# Patient Record
Sex: FEMALE | Race: WHITE | NOT HISPANIC OR LATINO | URBAN - METROPOLITAN AREA
[De-identification: names, ages, dates, MRNs, and addresses within clinical notes are randomized per-mention and may not be internally consistent; named-entity substitution may affect disease eponyms.]

---

## 2021-11-03 ENCOUNTER — EMERGENCY (EMERGENCY)
Facility: HOSPITAL | Age: 21
LOS: 1 days | Discharge: HOME | End: 2021-11-03
Attending: EMERGENCY MEDICINE | Admitting: EMERGENCY MEDICINE
Payer: COMMERCIAL

## 2021-11-03 VITALS
DIASTOLIC BLOOD PRESSURE: 87 MMHG | TEMPERATURE: 98 F | OXYGEN SATURATION: 100 % | SYSTOLIC BLOOD PRESSURE: 136 MMHG | HEART RATE: 108 BPM | RESPIRATION RATE: 16 BRPM

## 2021-11-03 LAB
ALBUMIN SERPL ELPH-MCNC: 4.7 G/DL — SIGNIFICANT CHANGE UP (ref 3.3–5)
ALP SERPL-CCNC: 60 U/L — SIGNIFICANT CHANGE UP (ref 40–120)
ALT FLD-CCNC: 20 U/L — SIGNIFICANT CHANGE UP (ref 4–33)
ANION GAP SERPL CALC-SCNC: 12 MMOL/L — SIGNIFICANT CHANGE UP (ref 7–14)
AST SERPL-CCNC: 22 U/L — SIGNIFICANT CHANGE UP (ref 4–32)
BASE EXCESS BLDV CALC-SCNC: 5.2 MMOL/L — HIGH (ref -2–3)
BASOPHILS # BLD AUTO: 0.09 K/UL — SIGNIFICANT CHANGE UP (ref 0–0.2)
BASOPHILS NFR BLD AUTO: 1.1 % — SIGNIFICANT CHANGE UP (ref 0–2)
BILIRUB SERPL-MCNC: 0.4 MG/DL — SIGNIFICANT CHANGE UP (ref 0.2–1.2)
BLOOD GAS VENOUS COMPREHENSIVE RESULT: SIGNIFICANT CHANGE UP
BUN SERPL-MCNC: 12 MG/DL — SIGNIFICANT CHANGE UP (ref 7–23)
CALCIUM SERPL-MCNC: 9.9 MG/DL — SIGNIFICANT CHANGE UP (ref 8.4–10.5)
CHLORIDE BLDV-SCNC: 102 MMOL/L — SIGNIFICANT CHANGE UP (ref 96–108)
CHLORIDE SERPL-SCNC: 101 MMOL/L — SIGNIFICANT CHANGE UP (ref 98–107)
CO2 BLDV-SCNC: 33.6 MMOL/L — HIGH (ref 22–26)
CO2 SERPL-SCNC: 26 MMOL/L — SIGNIFICANT CHANGE UP (ref 22–31)
CREAT SERPL-MCNC: 0.81 MG/DL — SIGNIFICANT CHANGE UP (ref 0.5–1.3)
D DIMER BLD IA.RAPID-MCNC: 181 NG/ML DDU — SIGNIFICANT CHANGE UP
EOSINOPHIL # BLD AUTO: 0.09 K/UL — SIGNIFICANT CHANGE UP (ref 0–0.5)
EOSINOPHIL NFR BLD AUTO: 1.1 % — SIGNIFICANT CHANGE UP (ref 0–6)
GAS PNL BLDV: 137 MMOL/L — SIGNIFICANT CHANGE UP (ref 136–145)
GLUCOSE BLDV-MCNC: 93 MG/DL — SIGNIFICANT CHANGE UP (ref 70–99)
GLUCOSE SERPL-MCNC: 92 MG/DL — SIGNIFICANT CHANGE UP (ref 70–99)
HCG SERPL-ACNC: <5 MIU/ML — SIGNIFICANT CHANGE UP
HCO3 BLDV-SCNC: 32 MMOL/L — HIGH (ref 22–29)
HCT VFR BLD CALC: 41.8 % — SIGNIFICANT CHANGE UP (ref 34.5–45)
HCT VFR BLDA CALC: 45 % — SIGNIFICANT CHANGE UP (ref 34.5–46.5)
HGB BLD CALC-MCNC: 14.9 G/DL — SIGNIFICANT CHANGE UP (ref 11.5–15.5)
HGB BLD-MCNC: 14.6 G/DL — SIGNIFICANT CHANGE UP (ref 11.5–15.5)
IANC: 4.14 K/UL — SIGNIFICANT CHANGE UP (ref 1.5–8.5)
IMM GRANULOCYTES NFR BLD AUTO: 0.2 % — SIGNIFICANT CHANGE UP (ref 0–1.5)
LACTATE BLDV-MCNC: 1 MMOL/L — SIGNIFICANT CHANGE UP (ref 0.5–2)
LYMPHOCYTES # BLD AUTO: 3.12 K/UL — SIGNIFICANT CHANGE UP (ref 1–3.3)
LYMPHOCYTES # BLD AUTO: 37.2 % — SIGNIFICANT CHANGE UP (ref 13–44)
MAGNESIUM SERPL-MCNC: 2.2 MG/DL — SIGNIFICANT CHANGE UP (ref 1.6–2.6)
MCHC RBC-ENTMCNC: 30.5 PG — SIGNIFICANT CHANGE UP (ref 27–34)
MCHC RBC-ENTMCNC: 34.9 GM/DL — SIGNIFICANT CHANGE UP (ref 32–36)
MCV RBC AUTO: 87.3 FL — SIGNIFICANT CHANGE UP (ref 80–100)
MONOCYTES # BLD AUTO: 0.92 K/UL — HIGH (ref 0–0.9)
MONOCYTES NFR BLD AUTO: 11 % — SIGNIFICANT CHANGE UP (ref 2–14)
NEUTROPHILS # BLD AUTO: 4.14 K/UL — SIGNIFICANT CHANGE UP (ref 1.8–7.4)
NEUTROPHILS NFR BLD AUTO: 49.4 % — SIGNIFICANT CHANGE UP (ref 43–77)
NRBC # BLD: 0 /100 WBCS — SIGNIFICANT CHANGE UP
NRBC # FLD: 0 K/UL — SIGNIFICANT CHANGE UP
NT-PROBNP SERPL-SCNC: 21 PG/ML — SIGNIFICANT CHANGE UP
PCO2 BLDV: 54 MMHG — HIGH (ref 39–42)
PH BLDV: 7.38 — SIGNIFICANT CHANGE UP (ref 7.32–7.43)
PLATELET # BLD AUTO: 214 K/UL — SIGNIFICANT CHANGE UP (ref 150–400)
PO2 BLDV: 24 MMHG — SIGNIFICANT CHANGE UP
POTASSIUM BLDV-SCNC: 4.2 MMOL/L — SIGNIFICANT CHANGE UP (ref 3.5–5.1)
POTASSIUM SERPL-MCNC: 4.4 MMOL/L — SIGNIFICANT CHANGE UP (ref 3.5–5.3)
POTASSIUM SERPL-SCNC: 4.4 MMOL/L — SIGNIFICANT CHANGE UP (ref 3.5–5.3)
PROT SERPL-MCNC: 8.2 G/DL — SIGNIFICANT CHANGE UP (ref 6–8.3)
RBC # BLD: 4.79 M/UL — SIGNIFICANT CHANGE UP (ref 3.8–5.2)
RBC # FLD: 11.9 % — SIGNIFICANT CHANGE UP (ref 10.3–14.5)
SAO2 % BLDV: 35.1 % — SIGNIFICANT CHANGE UP
SODIUM SERPL-SCNC: 139 MMOL/L — SIGNIFICANT CHANGE UP (ref 135–145)
TROPONIN T, HIGH SENSITIVITY RESULT: <6 NG/L — SIGNIFICANT CHANGE UP
WBC # BLD: 8.38 K/UL — SIGNIFICANT CHANGE UP (ref 3.8–10.5)
WBC # FLD AUTO: 8.38 K/UL — SIGNIFICANT CHANGE UP (ref 3.8–10.5)

## 2021-11-03 PROCEDURE — 99285 EMERGENCY DEPT VISIT HI MDM: CPT | Mod: 25

## 2021-11-03 PROCEDURE — 93010 ELECTROCARDIOGRAM REPORT: CPT

## 2021-11-03 PROCEDURE — 71046 X-RAY EXAM CHEST 2 VIEWS: CPT | Mod: 26

## 2021-11-03 RX ORDER — SODIUM CHLORIDE 9 MG/ML
1000 INJECTION INTRAMUSCULAR; INTRAVENOUS; SUBCUTANEOUS ONCE
Refills: 0 | Status: COMPLETED | OUTPATIENT
Start: 2021-11-03 | End: 2021-11-03

## 2021-11-03 RX ORDER — ASPIRIN/CALCIUM CARB/MAGNESIUM 324 MG
162 TABLET ORAL ONCE
Refills: 0 | Status: COMPLETED | OUTPATIENT
Start: 2021-11-03 | End: 2021-11-03

## 2021-11-03 RX ADMIN — SODIUM CHLORIDE 2000 MILLILITER(S): 9 INJECTION INTRAMUSCULAR; INTRAVENOUS; SUBCUTANEOUS at 23:43

## 2021-11-03 RX ADMIN — Medication 162 MILLIGRAM(S): at 22:11

## 2021-11-03 RX ADMIN — SODIUM CHLORIDE 1000 MILLILITER(S): 9 INJECTION INTRAMUSCULAR; INTRAVENOUS; SUBCUTANEOUS at 22:11

## 2021-11-03 RX ADMIN — SODIUM CHLORIDE 1000 MILLILITER(S): 9 INJECTION INTRAMUSCULAR; INTRAVENOUS; SUBCUTANEOUS at 23:44

## 2021-11-03 NOTE — ED PROVIDER NOTE - PATIENT PORTAL LINK FT
You can access the FollowMyHealth Patient Portal offered by [facility] by registering at the following website: [email or web address]. By joining [facility]’s FollowMyHealth portal, you will also be able to view your health information using other applications (apps) compatible with our system.

## 2021-11-03 NOTE — ED ADULT TRIAGE NOTE - CHIEF COMPLAINT QUOTE
alert oriented c/o mid chest/ epigastric pain  palpitations and SOB started 3 days ago became worse today  hx anxiety depression high cholesterol IBS   SaO2 100% in triage

## 2021-11-03 NOTE — ED ADULT NURSE NOTE - OBJECTIVE STATEMENT
19 yo F AAOx4 received to room 16, c/o SOB and palpitations for the past 3 days, worse today, hx anxiety/depression but states it doesn't feel like "one of those panic attack episodes," no distress on exam, resps even and unlabored on RA, saturation 100%, #20 placed to LAC, pending CXR results, report given to primary RN Devorah

## 2021-11-03 NOTE — ED PROVIDER NOTE - NSFOLLOWUPINSTRUCTIONS_ED_ALL_ED_FT
Tachycardia     AMBULATORY CARE:    Tachycardia (fast heart rate) Tachycardia (fast heart rate) is when your heart rate is 100 beats per minute or more at rest. It is normal for the heart rate to increase with activity or exercise and then decrease when you stop. A fast heart rate at rest may be caused by strong emotions, fever, activity, some medicines, drugs, or caffeine.    Heart Chambers         Other symptoms that may occur with a fast heartbeat: You may have no other symptoms with your fast heart rate, or you may have any of the following:  •Dizziness or lightheadedness      •Chest pain      •Fluttering or pounding heart       •Shortness of breath      Call your local emergency number (911 in the [place]) or have someone call if:   •You have any of the following signs of a heart attack: ?Squeezing, pressure, or pain in your chest      ?You may also have any of the following: ?Discomfort or pain in your back, neck, jaw, stomach, or arm      ?Shortness of breath      ?Nausea or vomiting      ?Lightheadedness or a sudden cold sweat        •You cannot be woken.      Call your doctor or cardiologist if:   •Your pulse is faster than you were told it should be.      •You have a fast heart rate often.      •You feel weak or dizzy.      •You have questions or concerns about your condition or care.      Treatment may be needed if your fast heart rate continues or happens often. You may need medicine, procedures, or surgery.    Check your heart rate (pulse) as directed: Your healthcare provider will show you how to check your pulse, and how often to check it. Write down how fast your pulse is and if it feels regular or like it is skipping beats. Also write down the activity you were doing if your heart rate is above 100. Bring the information with you to your follow-up appointment.    How to Take a Pulse         Help prevent a fast heart rate:   •Have less caffeine. Caffeine can increase your heart rate.      •Limit or do not drink alcohol. Alcohol can increase your heart rate. Ask your healthcare provider if it is okay for you to drink any alcohol. He or she can help you set limits for the number of drinks you have in 24 hours and in a week. A drink of alcohol is 12 ounces of beer, 5 ounces of wine, or 1½ ounces of liquor.      •Do not smoke. Nicotine and other chemicals in cigarettes can cause damage to your heart. Ask your healthcare provider for information if you currently smoke and need help to quit. E-cigarettes or smokeless tobacco still contain nicotine. Talk to your healthcare provider before you use these products.       •Do not use illegal drugs. Drugs such as meth and cocaine can increase your heart rate. Talk to your healthcare provider if you use illegal drugs and want to quit.      •Get more rest. Fatigue can cause your heart rate to increase. Ask your healthcare provider about the best exercise plan for you.      •Eat heart-healthy foods. These include fruits, vegetables, whole-grain breads, low-fat dairy products, beans, lean meats, and fish. Replace butter and margarine with heart-healthy oils such as olive oil and canola oil.             •Exercise regularly and maintain a healthy weight. Ask about the best exercise plan for you. Ask your healthcare provider what a healthy weight is for you. Ask him or her to help you create a safe weight loss plan if you are overweight.  [employer] Walking for Exercise           •Learn ways to cope with stress. Stress, fear, and anxiety can cause a fast heart rate. Your healthcare provider may recommend relaxation techniques and deep breathing exercises. Your healthcare provider may recommend you talk to someone about your stress or anxiety, such as a counselor or a trusted friend.      •Talk to your healthcare provider about all your current medicines. He or she may change a medicine if it is causing your fast heart rate. Do not stop taking any medicine unless directed by your provider.      Follow up with your doctor or cardiologist as directed: You may need more tests. Write down your questions so you remember to ask them at your visit.

## 2021-11-03 NOTE — ED PROVIDER NOTE - NS ED ROS FT
Constitutional: No fever, chills.  Eyes:  No visual changes  ENMT:  No neck pain  Cardiac:  +chest pain  Respiratory:  No cough, +SOB  GI:  No nausea, vomiting, diarrhea, abdominal pain.  :  No dysuria, hematuria  MS:  No back pain.  Neuro:  No headache or lightheadedness  Skin:  No skin rash  Except as documented in the HPI,  all other systems are negative.

## 2021-11-03 NOTE — ED PROVIDER NOTE - PHYSICAL EXAMINATION
Vital signs reviewed  GENERAL: Patient nontoxic appearing, NAD  HEAD: NCAT  EYES: Anicteric  ENT: MMM  NECK: Supple, non tender  RESPIRATORY: Normal respiratory effort. CTA B/L. No wheezing, rales, rhonchi  CARDIOVASCULAR: Regular rate and rhythm  ABDOMEN: Soft. Nondistended. Nontender. No guarding or rebound. No CVA tenderness.  MUSCULOSKELETAL/EXTREMITIES: Brisk cap refill. 2+ radial pulses. No leg edema.  SKIN:  Warm and dry  NEURO: AAOx3. No gross FND.  PSYCHIATRIC: Cooperative. Affect appropriate. Vital signs reviewed  GENERAL: Patient nontoxic appearing, NAD  HEAD: NCAT  EYES: Anicteric  ENT: MMM  NECK: Supple, non tender  RESPIRATORY: Normal respiratory effort. CTA B/L. No wheezing, rales, rhonchi  CARDIOVASCULAR: Regular rate and rhythm  ABDOMEN: Soft. Nondistended. Nontender. No guarding or rebound. No CVA tenderness.  MUSCULOSKELETAL/EXTREMITIES: Brisk cap refill. 2+ radial pulses. No leg edema.  SKIN:  Warm and dry  NEURO: AAOx3. No gross FND.  PSYCHIATRIC: Cooperative. Affect appropriate.  ATTENDING PHYSICAL EXAM  GEN - NAD; well appearing; A+O x3  HEAD - NC/AT; EYES/NOSE - PERRL, EOMI, mucous membranes moist, no discharge; THROAT: Oral cavity and pharynx normal. No inflammation, swelling, exudate, or lesions  NECK: Neck supple, non-tender without lymphadenopathy, no masses, no JVD  PULMONARY - CTA b/l, symmetric breath sounds, no w/r/r  CARDIAC -s1s2, mild tachy, no M,R,G  ABDOMEN - +NABS, ND, NT, soft, no guarding, no rebound, no masses   BACK - no CVA tenderness, No vertebral or paravertebral tenderness  EXTREMITIES - symmetric pulses, 2+ dp, capillary refill < 2 seconds, no clubbing, no cyanosis, no edema  NEUROLOGIC - alert, CN 2-12 intact  PSYCH - insight and judgment nl, memory nl, affect nl, thought nl

## 2021-11-03 NOTE — ED PROVIDER NOTE - CLINICAL SUMMARY MEDICAL DECISION MAKING FREE TEXT BOX
19 y/o F p/w palpitations, tachycardic, cp, satting well on ra. eval for pe, acs, pericarditis, myocarditis  pneumonia. low suspicion for dissection, given low risk factors and stable vitals. pending labs and imaging

## 2021-11-03 NOTE — ED PROVIDER NOTE - OBJECTIVE STATEMENT
19 y/o F hx of anxiety, depression, HLD p/w tachycardia x earlier today. pt states she has been tracking her HR on her watch, states her normal resting HR is 70s, lately it has been 120s and with ambulating 130s. she feels sob with excretion. never had similar sxs before. +substernal dull cp, intermittent,   pt denies any fever, chills, abdominal pain, v/d, dysuria, numbness   on OCP  no family hx of cardiac dz, DVT/PE. 21 y/o F hx of anxiety, depression, HLD p/w tachycardia x earlier today. pt states she has been tracking her HR on her watch, states her normal resting HR is 70s, lately it has been 120s and with ambulating 130s. she feels sob with excretion. never had similar sxs before. +substernal dull cp, intermittent,   pt denies any fever, chills, abdominal pain, v/d, dysuria, numbness   on OCP  no family hx of cardiac dz, DVT/PE.  Attending - Agree with above.  I evaluated patient myself. 21 y/o F c/o increasing SEAMAN and SOB at rest x few days 19 y/o F hx of anxiety, depression, HLD p/w tachycardia x earlier today. pt states she has been tracking her HR on her watch, states her normal resting HR is 70s, lately it has been 120s and with ambulating 130s. she feels sob with excretion. never had similar sxs before. +substernal dull cp, intermittent,   pt denies any fever, chills, abdominal pain, v/d, dysuria, numbness   on OCP  no family hx of cardiac dz, DVT/PE.  Attending - Agree with above.  I evaluated patient myself. 19 y/o F c/o increasing SEAMAN and SOB at rest x few days. Today noticed 'pressure' in anterior chest described as heaviness.  States having symptoms even when not feeling anxious.  Never had this before.  No recent trauma.  No fever, cough, current covid.  On OCP Mindy.  LMP 3 weeks ago.

## 2021-11-04 VITALS
HEART RATE: 89 BPM | DIASTOLIC BLOOD PRESSURE: 67 MMHG | RESPIRATION RATE: 19 BRPM | SYSTOLIC BLOOD PRESSURE: 113 MMHG | OXYGEN SATURATION: 100 %

## 2021-11-04 LAB — SARS-COV-2 RNA SPEC QL NAA+PROBE: SIGNIFICANT CHANGE UP

## 2021-11-04 PROCEDURE — 93308 TTE F-UP OR LMTD: CPT | Mod: 26

## 2021-11-04 NOTE — ED ADULT NURSE REASSESSMENT NOTE - NS ED NURSE REASSESS COMMENT FT1
Pt reports resolve of symptoms at this time. Connected to tele SR @80's, saturating well on room air. Pending discharge

## 2022-02-28 ENCOUNTER — INPATIENT (INPATIENT)
Facility: HOSPITAL | Age: 22
LOS: 8 days | Discharge: HOME | End: 2022-03-09
Attending: STUDENT IN AN ORGANIZED HEALTH CARE EDUCATION/TRAINING PROGRAM | Admitting: PSYCHIATRY & NEUROLOGY
Payer: COMMERCIAL

## 2022-02-28 ENCOUNTER — OUTPATIENT (OUTPATIENT)
Dept: OUTPATIENT SERVICES | Facility: HOSPITAL | Age: 22
LOS: 1 days | Discharge: HOME | End: 2022-02-28

## 2022-02-28 VITALS
TEMPERATURE: 99 F | SYSTOLIC BLOOD PRESSURE: 126 MMHG | HEART RATE: 84 BPM | OXYGEN SATURATION: 99 % | RESPIRATION RATE: 18 BRPM | DIASTOLIC BLOOD PRESSURE: 87 MMHG

## 2022-02-28 DIAGNOSIS — F41.9 ANXIETY DISORDER, UNSPECIFIED: ICD-10-CM

## 2022-02-28 DIAGNOSIS — F33.9 MAJOR DEPRESSIVE DISORDER, RECURRENT, UNSPECIFIED: ICD-10-CM

## 2022-02-28 DIAGNOSIS — F32.2 MAJOR DEPRESSIVE DISORDER, SINGLE EPISODE, SEVERE WITHOUT PSYCHOTIC FEATURES: ICD-10-CM

## 2022-02-28 PROBLEM — E78.5 HYPERLIPIDEMIA, UNSPECIFIED: Chronic | Status: ACTIVE | Noted: 2021-11-03

## 2022-02-28 LAB
ALBUMIN SERPL ELPH-MCNC: 4.5 G/DL — SIGNIFICANT CHANGE UP (ref 3.3–5)
ALP SERPL-CCNC: 51 U/L — SIGNIFICANT CHANGE UP (ref 40–120)
ALT FLD-CCNC: 23 U/L — SIGNIFICANT CHANGE UP (ref 4–33)
ANION GAP SERPL CALC-SCNC: 13 MMOL/L — SIGNIFICANT CHANGE UP (ref 7–14)
APPEARANCE UR: CLEAR — SIGNIFICANT CHANGE UP
AST SERPL-CCNC: 18 U/L — SIGNIFICANT CHANGE UP (ref 4–32)
BASOPHILS # BLD AUTO: 0.07 K/UL — SIGNIFICANT CHANGE UP (ref 0–0.2)
BASOPHILS NFR BLD AUTO: 0.9 % — SIGNIFICANT CHANGE UP (ref 0–2)
BILIRUB SERPL-MCNC: 0.6 MG/DL — SIGNIFICANT CHANGE UP (ref 0.2–1.2)
BILIRUB UR-MCNC: NEGATIVE — SIGNIFICANT CHANGE UP
BUN SERPL-MCNC: 7 MG/DL — SIGNIFICANT CHANGE UP (ref 7–23)
CALCIUM SERPL-MCNC: 9.4 MG/DL — SIGNIFICANT CHANGE UP (ref 8.4–10.5)
CHLORIDE SERPL-SCNC: 102 MMOL/L — SIGNIFICANT CHANGE UP (ref 98–107)
CO2 SERPL-SCNC: 24 MMOL/L — SIGNIFICANT CHANGE UP (ref 22–31)
COLOR SPEC: SIGNIFICANT CHANGE UP
CREAT SERPL-MCNC: 0.6 MG/DL — SIGNIFICANT CHANGE UP (ref 0.5–1.3)
DIFF PNL FLD: ABNORMAL
EGFR: 130 ML/MIN/1.73M2 — SIGNIFICANT CHANGE UP
EOSINOPHIL # BLD AUTO: 0.03 K/UL — SIGNIFICANT CHANGE UP (ref 0–0.5)
EOSINOPHIL NFR BLD AUTO: 0.4 % — SIGNIFICANT CHANGE UP (ref 0–6)
FLUAV AG NPH QL: SIGNIFICANT CHANGE UP
FLUBV AG NPH QL: SIGNIFICANT CHANGE UP
GLUCOSE SERPL-MCNC: 90 MG/DL — SIGNIFICANT CHANGE UP (ref 70–99)
GLUCOSE UR QL: NEGATIVE — SIGNIFICANT CHANGE UP
HCG SERPL-ACNC: <5 MIU/ML — SIGNIFICANT CHANGE UP
HCT VFR BLD CALC: 40.3 % — SIGNIFICANT CHANGE UP (ref 34.5–45)
HGB BLD-MCNC: 14.5 G/DL — SIGNIFICANT CHANGE UP (ref 11.5–15.5)
IANC: 4.56 K/UL — SIGNIFICANT CHANGE UP (ref 1.5–8.5)
IMM GRANULOCYTES NFR BLD AUTO: 0.5 % — SIGNIFICANT CHANGE UP (ref 0–1.5)
KETONES UR-MCNC: NEGATIVE — SIGNIFICANT CHANGE UP
LEUKOCYTE ESTERASE UR-ACNC: NEGATIVE — SIGNIFICANT CHANGE UP
LYMPHOCYTES # BLD AUTO: 2.58 K/UL — SIGNIFICANT CHANGE UP (ref 1–3.3)
LYMPHOCYTES # BLD AUTO: 31.5 % — SIGNIFICANT CHANGE UP (ref 13–44)
MCHC RBC-ENTMCNC: 30.3 PG — SIGNIFICANT CHANGE UP (ref 27–34)
MCHC RBC-ENTMCNC: 36 GM/DL — SIGNIFICANT CHANGE UP (ref 32–36)
MCV RBC AUTO: 84.1 FL — SIGNIFICANT CHANGE UP (ref 80–100)
MONOCYTES # BLD AUTO: 0.9 K/UL — SIGNIFICANT CHANGE UP (ref 0–0.9)
MONOCYTES NFR BLD AUTO: 11 % — SIGNIFICANT CHANGE UP (ref 2–14)
NEUTROPHILS # BLD AUTO: 4.56 K/UL — SIGNIFICANT CHANGE UP (ref 1.8–7.4)
NEUTROPHILS NFR BLD AUTO: 55.7 % — SIGNIFICANT CHANGE UP (ref 43–77)
NITRITE UR-MCNC: NEGATIVE — SIGNIFICANT CHANGE UP
NRBC # BLD: 0 /100 WBCS — SIGNIFICANT CHANGE UP
NRBC # FLD: 0 K/UL — SIGNIFICANT CHANGE UP
PCP SPEC-MCNC: SIGNIFICANT CHANGE UP
PH UR: 7 — SIGNIFICANT CHANGE UP (ref 5–8)
PLATELET # BLD AUTO: 231 K/UL — SIGNIFICANT CHANGE UP (ref 150–400)
POTASSIUM SERPL-MCNC: 3.7 MMOL/L — SIGNIFICANT CHANGE UP (ref 3.5–5.3)
POTASSIUM SERPL-SCNC: 3.7 MMOL/L — SIGNIFICANT CHANGE UP (ref 3.5–5.3)
PROT SERPL-MCNC: 7.5 G/DL — SIGNIFICANT CHANGE UP (ref 6–8.3)
PROT UR-MCNC: NEGATIVE — SIGNIFICANT CHANGE UP
RBC # BLD: 4.79 M/UL — SIGNIFICANT CHANGE UP (ref 3.8–5.2)
RBC # FLD: 11.9 % — SIGNIFICANT CHANGE UP (ref 10.3–14.5)
RSV RNA NPH QL NAA+NON-PROBE: SIGNIFICANT CHANGE UP
SARS-COV-2 RNA SPEC QL NAA+PROBE: SIGNIFICANT CHANGE UP
SODIUM SERPL-SCNC: 139 MMOL/L — SIGNIFICANT CHANGE UP (ref 135–145)
SP GR SPEC: 1.01 — SIGNIFICANT CHANGE UP (ref 1–1.05)
TOXICOLOGY SCREEN, DRUGS OF ABUSE, SERUM RESULT: SIGNIFICANT CHANGE UP
TSH SERPL-MCNC: 1.05 UIU/ML — SIGNIFICANT CHANGE UP (ref 0.27–4.2)
UROBILINOGEN FLD QL: SIGNIFICANT CHANGE UP
WBC # BLD: 8.18 K/UL — SIGNIFICANT CHANGE UP (ref 3.8–10.5)
WBC # FLD AUTO: 8.18 K/UL — SIGNIFICANT CHANGE UP (ref 3.8–10.5)

## 2022-02-28 PROCEDURE — 90839 PSYTX CRISIS INITIAL 60 MIN: CPT | Mod: 95

## 2022-02-28 PROCEDURE — 99285 EMERGENCY DEPT VISIT HI MDM: CPT | Mod: 25

## 2022-02-28 PROCEDURE — 93010 ELECTROCARDIOGRAM REPORT: CPT

## 2022-02-28 PROCEDURE — 99285 EMERGENCY DEPT VISIT HI MDM: CPT | Mod: GC

## 2022-02-28 RX ORDER — TRAZODONE HCL 50 MG
25 TABLET ORAL AT BEDTIME
Refills: 0 | Status: DISCONTINUED | OUTPATIENT
Start: 2022-02-28 | End: 2022-03-05

## 2022-02-28 RX ORDER — ACETAMINOPHEN 500 MG
650 TABLET ORAL ONCE
Refills: 0 | Status: COMPLETED | OUTPATIENT
Start: 2022-02-28 | End: 2022-02-28

## 2022-02-28 RX ORDER — ATORVASTATIN CALCIUM 80 MG/1
10 TABLET, FILM COATED ORAL AT BEDTIME
Refills: 0 | Status: DISCONTINUED | OUTPATIENT
Start: 2022-02-28 | End: 2022-03-09

## 2022-02-28 RX ORDER — CLONAZEPAM 1 MG
0.5 TABLET ORAL DAILY
Refills: 0 | Status: DISCONTINUED | OUTPATIENT
Start: 2022-02-28 | End: 2022-03-02

## 2022-02-28 RX ORDER — INFLUENZA VIRUS VACCINE 15; 15; 15; 15 UG/.5ML; UG/.5ML; UG/.5ML; UG/.5ML
0.5 SUSPENSION INTRAMUSCULAR ONCE
Refills: 0 | Status: COMPLETED | OUTPATIENT
Start: 2022-02-28 | End: 2022-02-28

## 2022-02-28 RX ORDER — ACETAMINOPHEN 500 MG
650 TABLET ORAL EVERY 6 HOURS
Refills: 0 | Status: DISCONTINUED | OUTPATIENT
Start: 2022-02-28 | End: 2022-03-09

## 2022-02-28 RX ORDER — SERTRALINE 25 MG/1
150 TABLET, FILM COATED ORAL DAILY
Refills: 0 | Status: DISCONTINUED | OUTPATIENT
Start: 2022-03-01 | End: 2022-03-02

## 2022-02-28 RX ADMIN — Medication 650 MILLIGRAM(S): at 20:56

## 2022-02-28 RX ADMIN — Medication 25 MILLIGRAM(S): at 22:28

## 2022-02-28 RX ADMIN — Medication 1 MILLIGRAM(S): at 19:06

## 2022-02-28 RX ADMIN — ATORVASTATIN CALCIUM 10 MILLIGRAM(S): 80 TABLET, FILM COATED ORAL at 22:28

## 2022-02-28 RX ADMIN — Medication 650 MILLIGRAM(S): at 19:11

## 2022-02-28 NOTE — ED PROVIDER NOTE - OBJECTIVE STATEMENT
21 year old female history of anxiety and depression presents for worsening anxiety, depression and SI. Patient states that she had a "stomach bug" 4-5 days ago consisting of acid reflux and diarrhea, patient states she had a negative covid test, symptoms resolved but since has been extremely anxious and depressed.  Patient states that due to anxiety she has been feeling suicidal with a loose plan of crashing car and is afraid to drive due to this plan.  Pt reports crying, no appetite, not sleeping, over-thinking/excessive worry, feeling on edge, worthlessness/guilt/burden to family, isolating, low motivation and anhedonia.  Patient also endorses having panic attacks where she feels chest pain / palpitations and shortness of breath, but sx resolve when panic attack subsides. Patient denies HI/AH/VH.  Patient denies illicit drugs or ETOH, no other or current physical complaints or concerns.   Patient went to [facility], requested voluntary admission but sent to the ER due to lack of inpatient bed availability.

## 2022-02-28 NOTE — ED ADULT NURSE NOTE - OBJECTIVE STATEMENT
Pt received into . AAOx4, appears tearful and anxious. Pt being sent in from crisis center for worsening anxiety over the past 4 days. c/o having SI. Denies drug/alcohol use. Pt states she is compliant with her medications. Psych at bedside for evaluation. Safety maintained, needs are met. Labs drawn and sent. no acute distress. Awaiting further plan of care.

## 2022-02-28 NOTE — ED BEHAVIORAL HEALTH ASSESSMENT NOTE - SUMMARY
Chelo is a 21-year-old female, single, non-caregiver residing with her parents, currently attending [employer], with PPhx anxiety, panic and depression with long hx of outpatient treatment (currently in   treatment with Dr. Morataya for the past 3 years - is rx'd Sertraline 150mg, Trazadone 25mg and Klonopin 0.5mg), no history of violence, no legal involvement, no self-injurious behaviors, no suicide attempts, no psychiatric hospitalizations and no access to guns/weapons, who presents to [facility] ED from the [facility] crisis center where she presented seeking voluntary admission for SI, depression and anxiety.     Patient presents as depressed with acutely worsening anxiety leading to SI of increasing intensity, now to the point where she does not feel safe being on her own. Her anxiety has increased to the point of having panic attacks and poor appetite with recent weight loss. She is seeking and warrants voluntary psychiatric admission for safety, stabilization and treatment optimization. She denies suicidal intent and plan in the hospital.     Plan:  - admit to [facility] on 9.13   - q15 checks  - PRN's: Ativan 1 mg q4H PO PRN for anxiety/agitation. Ativan 2 mg IM for severe agitation. Melatonin 3 mg qHS PRN for insomnia.   - Standing psych meds - for now, will continue home meds.   --> Klonopin 0.5 mg daily (started with outpatient psychiatrist today)  --> Sertraline 150 mg daily (consider increasing in AM)   --> Trazodone 25 mg qHS for insomnia   Medical:  - no acute medical issues  - takes Atorvastatin 10 mg qHS  - takes Mindy birth control, f/u re: bringing home med   - f/u TSH, CMP, CMC, lipids, A1C

## 2022-02-28 NOTE — ED ADULT TRIAGE NOTE - CHIEF COMPLAINT QUOTE
Pt being sent in from crisis center for worsening anxiety and SI. Pt states she is compliant with her medications. Denies alcohol/drug use. Pt cooperative.

## 2022-02-28 NOTE — ED BEHAVIORAL HEALTH ASSESSMENT NOTE - CASE SUMMARY
21F with depression and anxiety presents seeking admission for worsening symptoms. Patient reports unbearable anxiety without relief, panic symptoms, worsening depression with suicidal thoughts and vague plans to drown or get in a car accident. Symptoms persist despite medication adherence and outpatient follow up. She feels unsafe and is requesting, and appropriate for, voluntary psychiatric admission. Admit to 2N, voluntary, does not require 1:1 in locked supervised setting. I agree with note/plan above.

## 2022-02-28 NOTE — ED BEHAVIORAL HEALTH ASSESSMENT NOTE - RISK ASSESSMENT
Moderate Acute Suicide Risk RF: Depressed, acutely worsening anxiety, SI with ideas of methods, does not feel safe to be by herself due to SI, panic attacks, h/o anxiety and depression.   PF: help seeking, future oriented, connected to outpatient care, supportive family  Given the above, patient is at a moderate acute risk of suicide, and would benefit from voluntary inpatient admission.

## 2022-02-28 NOTE — ED PROVIDER NOTE - CLINICAL SUMMARY MEDICAL DECISION MAKING FREE TEXT BOX
21 year old female history of anxiety and depression presents for worsening anxiety, depression and SI.  Also endorsing panic attacks with associated sob / chest pain / palpitations which subside when panic attack resolves.  Low suspicion for PE/ACS/PTX or other concerning medical diagnosis.  Medical evaluation performed. There is no clinical evidence of intoxication or any acute medical problem requiring immediate intervention. Patient is awaiting psychiatric consultation. Final disposition will be determined by psychiatrist.

## 2022-02-28 NOTE — ED BEHAVIORAL HEALTH NOTE - BEHAVIORAL HEALTH NOTE
COVID Exposure Screen- Patient  1.	*Have you had a COVID-19 test in the last 90 days?  (  ) Yes   (  ) No   (  ) Unknown- Reason: _____  IF YES PROCEED TO QUESTION #2. IF NO OR UNKNOWN, PLEASE SKIP TO QUESTION #3.  2.	Date of test(s) and result(s): ________  3.	*Have you tested positive for COVID-19 antibodies? (  ) Yes   (  ) No   (  ) Unknown- Reason: _____  IF YES PROCEED TO QUESTION #4. IF NO or UNKNOWN, PLEASE SKIP TO QUESTION #5.  4.	Date of positive antibody test: ________  5.	*Have you received 2 doses of the COVID-19 vaccine? (  ) Yes   (  ) No   (  ) Unknown- Reason: _____   IF YES PROCEED TO QUESTION #6. IF NO or UNKNOWN, PLEASE SKIP TO QUESTION #7.  6.	Date of second dose: ________  7.	*In the past 10 days, have you been around anyone with a positive COVID-19 test?* (  ) Yes   (  ) No   (  ) Unknown- Reason: ____  IF YES PROCEED TO QUESTION #8. IF NO or UNKNOWN, PLEASE SKIP TO QUESTION #13.  8.	Were you within 6 feet of them for at least 15 minutes? (  ) Yes   (  ) No   (  ) Unknown- Reason: _____  9.	Have you provided care for them? (  ) Yes   (  ) No   (  ) Unknown- Reason: ______  10.	Have you had direct physical contact with them (touched, hugged, or kissed them)? (  ) Yes   (  ) No    (  ) Unknown- Reason: _____  11.	Have you shared eating or drinking utensils with them? (  ) Yes   (  ) No    (  ) Unknown- Reason: ____  12.	Have they sneezed, coughed, or somehow gotten respiratory droplets on you? (  ) Yes   (  ) No    (  ) Unknown- Reason: ______  13.	*Have you been out of [place] within the past 10 days?* (  ) Yes   (  ) No   (  ) Unknown- Reason: _____  IF YES PLEASE ANSWER THE FOLLOWING QUESTIONS:  14.	Which state/country have you been to? ______  15.	Were you there over 24 hours? (  ) Yes   (  ) No    (  ) Unknown- Reason: ______  16.	Date of return to [facility]: ______ COVID Exposure Screen- Patient  1.	*Have you had a COVID-19 test in the last 90 days?  (x  ) Yes   (  ) No   (  ) Unknown- Reason: stomach upset  IF YES PROCEED TO QUESTION #2. IF NO OR UNKNOWN, PLEASE SKIP TO QUESTION #3.  2.	Date of test(s) and result(s): tues 2/22/22 negative  3.	*Have you tested positive for COVID-19 antibodies? (  ) Yes   (  ) No   ( x ) Unknown- Reason: not tested  IF YES PROCEED TO QUESTION #4. IF NO or UNKNOWN, PLEASE SKIP TO QUESTION #5.  4.	Date of positive antibody test: ________  5.	*Have you received 2 doses of the COVID-19 vaccine? ( x ) Yes   (  ) No   (  ) Unknown- Reason: _____   IF YES PROCEED TO QUESTION #6. IF NO or UNKNOWN, PLEASE SKIP TO QUESTION #7.  6.	Date of second dose: Feb 12 2021  7.	*In the past 10 days, have you been around anyone with a positive COVID-19 test?* (  ) Yes   ( x ) No   (  ) Unknown- Reason: ____  IF YES PROCEED TO QUESTION #8. IF NO or UNKNOWN, PLEASE SKIP TO QUESTION #13.  8.	Were you within 6 feet of them for at least 15 minutes? (  ) Yes   (  ) No   (  ) Unknown- Reason: _____  9.	Have you provided care for them? (  ) Yes   (  ) No   (  ) Unknown- Reason: ______  10.	Have you had direct physical contact with them (touched, hugged, or kissed them)? (  ) Yes   (  ) No    (  ) Unknown- Reason: _____  11.	Have you shared eating or drinking utensils with them? (  ) Yes   (  ) No    (  ) Unknown- Reason: ____  12.	Have they sneezed, coughed, or somehow gotten respiratory droplets on you? (  ) Yes   (  ) No    (  ) Unknown- Reason: ______  13.	*Have you been out of [place] within the past 10 days?* (  ) Yes   (  x) No   (  ) Unknown- Reason: _____  IF YES PLEASE ANSWER THE FOLLOWING QUESTIONS:  14.	Which state/country have you been to? ______  15.	Were you there over 24 hours? (  ) Yes   (  ) No    (  ) Unknown- Reason: ______  16.	Date of return to [facility]: ______

## 2022-02-28 NOTE — ED BEHAVIORAL HEALTH NOTE - BEHAVIORAL HEALTH NOTE
COLLATERAL OBTAINED FROM NITO ROACH, [facility]:   who reported that the Pt was seen today at the clinic due to worsening symptoms of depression, anxiety and SI (multiple plans: crashing car, standing in front of moving traffic, drowning). Pt verbalized being afraid of driving her car lately. she has not been attending [employer] lately (wherein, she is a BioTech major) due to ongoing symptoms. depressive symptoms described as experiencing sad mood + anhedonia; associated sleep disturbances; feeling guilty and feels like she is a "burden to her family".  anxiety symptoms described as experiencing chest tightness with SOB and nausea. Pt is being seen by a community psychiatrist and prescribed with: Zoloft 150mg daily, Trazodone 25mg HS and Klonopin 0.5mg daily.      identified ongoing stressors: academic related, relational familial conflicts - ? particularly mother; ? recent "stomach infection"    Pt has no reported hx of psych admissions, no past or recent hx of suicide attempts nor engaged in self injurious behaviors. no reported illicit substance use.    Medical hx: HLD - on Lipitor 10mg daily COLLATERAL OBTAINED FROM NITO ROACH, [facility]:   who reported that the Pt was seen today at the clinic due to worsening symptoms of depression, anxiety and SI (multiple plans: crashing car, standing in front of moving traffic, drowning). Pt verbalized being afraid of driving her car lately. she has not been attending [employer] lately (wherein, she is a BioTech major) due to ongoing symptoms. depressive symptoms described as experiencing sad mood + anhedonia; associated sleep disturbances; feeling guilty and feels like she is a "burden to her family".  anxiety symptoms described as experiencing chest tightness with SOB and nausea. Pt is being seen by a community psychiatrist and prescribed with: Zoloft 150mg daily, Trazodone 25mg HS and Klonopin 0.5mg daily.      identified ongoing stressors: academic related, relational familial conflicts - ? particularly mother; ? recent "stomach infection"    Pt has no reported hx of psych admissions, no past or recent hx of suicide attempts nor engaged in self injurious behaviors. no reported illicit substance use.    Medical hx: HLD - on Lipitor 10mg daily    COLLATERAL OBTAINED FROM CHIO CROW, FATHER ([phone]): DESCRIBED Pt at baseline as an "amazing, wonderful, goal oriented, community oriented person". has hx of depression and anxiety. at times, with stressors, would manifest with anxiety but overall, seems to overcome and revert back to euthymia. father reported that Pt had gastroenteritis (was covid negative) last week. this episode supposedly triggered Pt's anxiety.  since then, has been progressively anxious, "shaking constantly"; tapping foot.. had verbalized to family that she feels like she is a burden to them; has intermittent/ passive SI but no communicated intent or plans. father denied Pt exhibiting any signs/ symptoms suggestive of brielle; no psychotic symptoms.  in view of Pt's worsening depression and anxiety along with SI, family informed psychiatrist. psychiatrist then referred Pt to [facility] for further evaluation COLLATERAL OBTAINED FROM NITO ROACH, [facility]:   who reported that the Pt was seen today at the clinic due to worsening symptoms of depression, anxiety and SI (multiple plans: crashing car, standing in front of moving traffic, drowning). Pt verbalized being afraid of driving her car lately. she has not been attending [employer] lately (wherein, she is a BioTech major) due to ongoing symptoms. depressive symptoms described as experiencing sad mood + anhedonia; associated sleep disturbances; feeling guilty and feels like she is a "burden to her family".  anxiety symptoms described as experiencing chest tightness with SOB and nausea. Pt is being seen by a community psychiatrist and prescribed with: Zoloft 150mg daily, Trazodone 25mg HS and Klonopin 0.5mg daily.      identified ongoing stressors: academic related, relational familial conflicts - ? particularly mother; ? recent "stomach infection"    Pt has no reported hx of psych admissions, no past or recent hx of suicide attempts nor engaged in self injurious behaviors. no reported illicit substance use.    Medical hx: HLD - on Lipitor 10mg daily    COLLATERAL OBTAINED FROM CHIO CROW, FATHER ([phone]): DESCRIBED Pt at baseline as an "amazing, wonderful, goal oriented, community oriented person". has hx of depression and anxiety. at times, with stressors, would manifest with anxiety but overall, seems to overcome and revert back to euthymia. father reported that Pt had gastroenteritis (was covid negative) last week. this episode supposedly triggered Pt's anxiety.  since then, has been progressively anxious, "shaking constantly"; tapping foot.. had verbalized to family that she feels like she is a burden to them; has intermittent/ passive SI but no communicated intent or plans. father denied Pt exhibiting any signs/ symptoms suggestive of brielle; no psychotic symptoms.  in view of Pt's worsening depression and anxiety along with SI, family informed psychiatrist. psychiatrist then referred Pt to [facility] for further evaluation    COLLATERAL OBTAINED FROM DR WASHINGTON MENENDEZ, PSYCHIATRIST ([phone]): who has been Pt's psychiatrist since 9/2019. described Pt as possessing "poor coping skills" amidst besieged by stressors. compliant with Zoloft 150mg daily + trazodone 25mg HS; was started Klonopin last night (PRN only) in an attempt to alleviate worsening anxiety. Pt reportedly has been "struggling to stay/ feel safe at home".. admitted to psychiatrist that she has been harboring passive SI; fleeting thoughts of crashing her car but no intent raised. no other plans verbalized. no reported hx of brielle or pyshcosis.  given said symptoms, Dr Menendez adviced Pt to come to [facility]; advocated for in-Pt psych admission.

## 2022-02-28 NOTE — ED BEHAVIORAL HEALTH ASSESSMENT NOTE - DESCRIPTION
in college, lives at home with mom and dad Anxious, no acute events, received Ativan 1 mg PO x1 for anxiety no significant pmhx

## 2022-02-28 NOTE — ED PROVIDER NOTE - PHYSICAL EXAMINATION
GEN:   tearful, in no apparent distress, AOx3  EYES:   PERRL, extra-occular movements intact  RESP:   clear to auscultation bilaterally, non-labored, speaking in full sentences  ABD:   soft, non tender, no guarding  :   no cva tenderness  MSK:   no musculoskeletal tenderness, 5/5 strength, moving all extremities  SKIN:   dry, intact, no rash  NEURO:   AOx3, no focal weakness or loss of sensation, gait normal, GCS 15  PSYCH: tearful, mildly anxious, cooperative, no evidence of hallucinations, paranoia, intoxication or withdrawal from any substances.

## 2022-02-28 NOTE — ED BEHAVIORAL HEALTH ASSESSMENT NOTE - HPI (INCLUDE ILLNESS QUALITY, SEVERITY, DURATION, TIMING, CONTEXT, MODIFYING FACTORS, ASSOCIATED SIGNS AND SYMPTOMS)
Chelo is a 21-year-old female, single, non-caregiver residing with her parents, currently attending [employer], with PPhx anxiety, panic and depression with long hx of outpatient treatment (currently in   treatment with Dr. Morataya for the past 3 years - is rx'd Sertraline 150mg, Trazadone 25mg and Klonopin 0.5mg), no history of violence, no legal involvement, no self-injurious behaviors, no suicide attempts, no psychiatric hospitalizations and no access to guns/weapons, who presents to [facility] ED from the [facility] center where she presented seeking voluntary admission for SI, depression and anxiety.     Patient interviewed in her room in  ED. She is very anxious and tearful, cooperative with interview. Reports long history of anxiety and depression, and states that her anxiety acutely worsened starting last week when she came down with a stomach bug. States that she had a panic attack today with tachycardia, increased RR, shaking, headache, dizziness. States that she has had 5 of these in her life with prior one 6 months ago. States that over the last week she has had worsening SI including thoughts of crashing her car (see more details of SI below), and she describes that today she no longer felt comfortable being by herself due to her SI and also due to her anxiety. Denies suicidal intent or taking actions toward ideas of suicide she has thought of. Report very poor appetite due to anxiety with 3 lb weight loss. Denies current suicidal intent and plan in the hospital. Denies current and historical sx of brielle and psychosis. Denies substance abuse recently and historically. Tried a shot of alcohol twice recently at recommendation of parents when her anxiety got very bad. Was started on 0.5 mg of Klonopin today by her psychiatrist but it only "made me woozy for a half hour and then my anxiety came back." See ED  notes for collateral from dad and psychiatrist.     Per [facility] eval today:  "Pt presents at [facility] for assessment requesting voluntary admission for worsening sx's of anxiety, panic, depression and SI for a   period of 5 days. Pt rates both feelings of depression and anxiety as an "11" (Scale 0-10, 10=highest). Pt reports sx's have   impacted level of functioning, reports she has been missing her college classes. Pt reports this morning hit her head against the   table and has been digging her nails into her skin due to intensity of sx's. Pt reports crying, no appetite, not sleeping,   over-thinking/excessive worry, trouble concentrating, feeling on edge/trouble relaxing, worthlessness/guilt/burden to family,   isolating/withdrawing, low motivation and anhedonia. Pt reports physical sx's of shakiness, SOB, chest tightness and nausea. Pt   reports experiencing SI currently with thought of "I don't want to be alive anymore." Pt reports SI has been intensifying since   Friday 2/25/22. Pt reports loose methods of crashing car, putting self in front of a car or drowning self. Pt reports since yesterday   she has been afraid to drive self b/c of SI. Pt denies past and present suicide rehearsals and aborted/interrupted suicide attempts.   Pt denies past and present homicidal ideation. Pt denies past and present sx's of PTSD, OCD, psychosis and brielle. Pt reports no   substance use/abuse, reports no hx of blackouts/withdrawals. PMH: Pt reports high cholesterol and IBS. Pt reports she is rx'd Atorvastatin 10mg. NKDA Pt presents as anxious and depressed with matching affect. Pt is tearful throughout assessment. Pt displays linear thought process,   is oriented x3. Pt displays no perceptual disturbances. Pt's speech is wnl in rate, output and volume. Pt presents as help seeking. Undersigned provided psychoeducation regarding mental health sx's, different modalities of treatment (inpatient vs outpt vs php),   the process of mental health tx and emergency procedures/resources to both pt and her father. Pt requests voluntary admission   due to acuity of sx's and concern of safety. Undersigned called CI and was notified there at no available beds at [facility].   Undersigned discussed w/pt and father current status of beds at [facility] and going to [facility] ED for assistance with voluntary   admission. Both pt and father agree with this plan. Pt and father decline EMS transfer. Undersigned provided verbal handoff to   [facility] throughput nurse and behavioral health.  Undersigned rec'd notficiation from [facility] staff that pt arrived to [facility] ED." Chelo is a 21-year-old female, single, non-caregiver residing with her parents, currently attending [employer], with PPhx anxiety, panic and depression with long hx of outpatient treatment (currently in   treatment with Dr. Morataya for the past 3 years - is rx'd Sertraline 150mg, Trazadone 25mg and Klonopin 0.5mg), no history of violence, no legal involvement, no self-injurious behaviors, no suicide attempts, no psychiatric hospitalizations and no access to guns/weapons, who presents to [facility] ED from the [facility] center where she presented seeking voluntary admission for SI, depression and anxiety.     Patient interviewed in her room in  ED. She is very anxious and tearful, cooperative with interview. Reports long history of anxiety and depression, and states that her anxiety acutely worsened starting last week when she came down with a stomach bug. States that she had a panic attack today with tachycardia, increased RR, shaking, headache, dizziness. States that she has had 5 of these in her life with prior one 6 months ago. States that over the last week she has had worsening SI including thoughts of crashing her car (see more details of SI below), and she describes that today she no longer felt comfortable being by herself due to her SI and also due to her anxiety. Denies suicidal intent or taking actions toward ideas of suicide she has thought of. Report very poor appetite due to anxiety with 3 lb weight loss. Denies current suicidal intent and plan in the hospital. Denies current and historical sx of brielle and psychosis. Denies substance abuse recently and historically. Tried a shot of alcohol twice recently at recommendation of parents when her anxiety got very bad. Was started on 0.5 mg of Klonopin today by her psychiatrist but it only "made me woozy for a half hour and then my anxiety came back." See ED  notes for collateral from dad and psychiatrist.     Per [facility] eval today:  "Pt presents at [facility] for assessment requesting voluntary admission for worsening sx's of anxiety, panic, depression and SI for a period of 5 days. Pt rates both feelings of depression and anxiety as an "11" (Scale 0-10, 10=highest). Pt reports sx's have impacted level of functioning, reports she has been missing her college classes. Pt reports this morning hit her head against the table and has been digging her nails into her skin due to intensity of sx's. Pt reports crying, no appetite, not sleeping, over-thinking/excessive worry, trouble concentrating, feeling on edge/trouble relaxing, worthlessness/guilt/burden to family, isolating/withdrawing, low motivation and anhedonia. Pt reports physical sx's of shakiness, SOB, chest tightness and nausea. Pt reports experiencing SI currently with thought of "I don't want to be alive anymore." Pt reports SI has been intensifying since Friday 2/25/22. Pt reports loose methods of crashing car, putting self in front of a car or drowning self. Pt reports since yesterday she has been afraid to drive self b/c of SI. Pt denies past and present suicide rehearsals and aborted/interrupted suicide attempts. Pt denies past and present homicidal ideation. Pt denies past and present sx's of PTSD, OCD, psychosis and brielle. Pt reports no substance use/abuse, reports no hx of blackouts/withdrawals. PMH: Pt reports high cholesterol and IBS. Pt reports she is rx'd Atorvastatin 10mg. NKDA Pt presents as anxious and depressed with matching affect. Pt is tearful throughout assessment. Pt displays linear thought process, is oriented x3. Pt displays no perceptual disturbances. Pt's speech is wnl in rate, output and volume. Pt presents as help seeking. Undersigned provided psychoeducation regarding mental health sx's, different modalities of treatment (inpatient vs outpt vs php), the process of mental health tx and emergency procedures/resources to both pt and her father. Pt requests voluntary admission due to acuity of sx's and concern of safety. Undersigned called CI and was notified there at no available beds at [facility]. Undersigned discussed w/pt and father current status of beds at [facility] and going to [facility] ED for assistance with voluntary admission. Both pt and father agree with this plan. Pt and father decline EMS transfer. Undersigned provided verbal handoff to [facility] throughput nurse and behavioral health.  Undersigned rec'd notficiation from [facility] staff that pt arrived to [facility] ED."

## 2022-03-01 LAB
A1C WITH ESTIMATED AVERAGE GLUCOSE RESULT: 4.7 % — SIGNIFICANT CHANGE UP (ref 4–5.6)
BASOPHILS # BLD AUTO: 0.07 K/UL — SIGNIFICANT CHANGE UP (ref 0–0.2)
BASOPHILS NFR BLD AUTO: 1.3 % — SIGNIFICANT CHANGE UP (ref 0–2)
CHOLEST SERPL-MCNC: 164 MG/DL — SIGNIFICANT CHANGE UP
COVID-19 SPIKE DOMAIN AB INTERP: POSITIVE
COVID-19 SPIKE DOMAIN ANTIBODY RESULT: >250 U/ML — HIGH
EOSINOPHIL # BLD AUTO: 0.03 K/UL — SIGNIFICANT CHANGE UP (ref 0–0.5)
EOSINOPHIL NFR BLD AUTO: 0.6 % — SIGNIFICANT CHANGE UP (ref 0–6)
ESTIMATED AVERAGE GLUCOSE: 88 — SIGNIFICANT CHANGE UP
HCT VFR BLD CALC: 40.6 % — SIGNIFICANT CHANGE UP (ref 34.5–45)
HDLC SERPL-MCNC: 61 MG/DL — SIGNIFICANT CHANGE UP
HGB BLD-MCNC: 14.3 G/DL — SIGNIFICANT CHANGE UP (ref 11.5–15.5)
IANC: 2.93 K/UL — SIGNIFICANT CHANGE UP (ref 1.5–8.5)
IMM GRANULOCYTES NFR BLD AUTO: 0.7 % — SIGNIFICANT CHANGE UP (ref 0–1.5)
LIPID PNL WITH DIRECT LDL SERPL: 81 MG/DL — SIGNIFICANT CHANGE UP
LYMPHOCYTES # BLD AUTO: 1.85 K/UL — SIGNIFICANT CHANGE UP (ref 1–3.3)
LYMPHOCYTES # BLD AUTO: 33.9 % — SIGNIFICANT CHANGE UP (ref 13–44)
MCHC RBC-ENTMCNC: 29.7 PG — SIGNIFICANT CHANGE UP (ref 27–34)
MCHC RBC-ENTMCNC: 35.2 GM/DL — SIGNIFICANT CHANGE UP (ref 32–36)
MCV RBC AUTO: 84.2 FL — SIGNIFICANT CHANGE UP (ref 80–100)
MONOCYTES # BLD AUTO: 0.53 K/UL — SIGNIFICANT CHANGE UP (ref 0–0.9)
MONOCYTES NFR BLD AUTO: 9.7 % — SIGNIFICANT CHANGE UP (ref 2–14)
NEUTROPHILS # BLD AUTO: 2.93 K/UL — SIGNIFICANT CHANGE UP (ref 1.8–7.4)
NEUTROPHILS NFR BLD AUTO: 53.8 % — SIGNIFICANT CHANGE UP (ref 43–77)
NON HDL CHOLESTEROL: 103 MG/DL — SIGNIFICANT CHANGE UP
NRBC # BLD: 0 /100 WBCS — SIGNIFICANT CHANGE UP
NRBC # FLD: 0 K/UL — SIGNIFICANT CHANGE UP
PLATELET # BLD AUTO: 218 K/UL — SIGNIFICANT CHANGE UP (ref 150–400)
RBC # BLD: 4.82 M/UL — SIGNIFICANT CHANGE UP (ref 3.8–5.2)
RBC # FLD: 12 % — SIGNIFICANT CHANGE UP (ref 10.3–14.5)
SARS-COV-2 IGG+IGM SERPL QL IA: >250 U/ML — HIGH
SARS-COV-2 IGG+IGM SERPL QL IA: POSITIVE
TRIGL SERPL-MCNC: 110 MG/DL — SIGNIFICANT CHANGE UP
TSH SERPL-MCNC: 1.18 UIU/ML — SIGNIFICANT CHANGE UP (ref 0.27–4.2)
WBC # BLD: 5.45 K/UL — SIGNIFICANT CHANGE UP (ref 3.8–10.5)
WBC # FLD AUTO: 5.45 K/UL — SIGNIFICANT CHANGE UP (ref 3.8–10.5)

## 2022-03-01 PROCEDURE — 99222 1ST HOSP IP/OBS MODERATE 55: CPT

## 2022-03-01 PROCEDURE — 90853 GROUP PSYCHOTHERAPY: CPT

## 2022-03-01 RX ADMIN — Medication 25 MILLIGRAM(S): at 21:03

## 2022-03-01 RX ADMIN — SERTRALINE 150 MILLIGRAM(S): 25 TABLET, FILM COATED ORAL at 08:15

## 2022-03-01 RX ADMIN — Medication 0.5 MILLIGRAM(S): at 08:17

## 2022-03-01 RX ADMIN — ATORVASTATIN CALCIUM 10 MILLIGRAM(S): 80 TABLET, FILM COATED ORAL at 21:03

## 2022-03-01 NOTE — BH INPATIENT PSYCHIATRY ASSESSMENT NOTE - DESCRIPTION
youngest of 4 siblings. Lives with parents in [place]. 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 Reports being in honors classes in high school and graduated on time.

## 2022-03-01 NOTE — BH INPATIENT PSYCHIATRY ASSESSMENT NOTE - CURRENT MEDICATION
MEDICATIONS  (STANDING):  atorvastatin 10 milliGRAM(s) Oral at bedtime  clonazePAM  Tablet 0.5 milliGRAM(s) Oral daily  influenza   Vaccine 0.5 milliLiter(s) IntraMuscular once  sertraline 150 milliGRAM(s) Oral daily  traZODone 25 milliGRAM(s) Oral at bedtime    MEDICATIONS  (PRN):  acetaminophen     Tablet .. 650 milliGRAM(s) Oral every 6 hours PRN Mild Pain (1 - 3), Moderate Pain (4 - 6)  LORazepam     Tablet 1 milliGRAM(s) Oral every 4 hours PRN anxiety/agitation  LORazepam   Injectable 2 milliGRAM(s) IntraMuscular once PRN severe agitation

## 2022-03-01 NOTE — PSYCHIATRIC REHAB INITIAL EVALUATION - NSBHPRRECOMMEND_PSY_ALL_CORE
Psychiatric rehabilitation staff approached patient to orient her to psychiatric rehabilitation staff and services. Patient was receptive to psychiatric rehabilitation staff engagement. Pt was observed tearful, and reported reason for admission as increasing SI (to walk in front of a car or to crash her car), depression, and anxiety. As per chart, pt endorsed hitting her head against the table, digging her nails into her skin due to the intensity of sx's, crying, no appetite, not sleeping, over thinking, excessive worry, and feelings of worthlessness. As per chart, pt has history of anxiety, panic, depression, long history of outpatient treatment (currently in treatment with Dr. Menendez for the past 3 yrs), and no prior psychiatric hospitalizations.

## 2022-03-01 NOTE — BH INPATIENT PSYCHIATRY ASSESSMENT NOTE - NSDCCRITERIA_PSY_ALL_CORE
CGI < / = 3 When pt is no longer an acute or imminent risk of harm to self or others, and is able to care for self safely, pt may then be discharged.

## 2022-03-01 NOTE — BH INPATIENT PSYCHIATRY ASSESSMENT NOTE - NSTXPROBDEPRES_PSY_ALL_CORE
DEPRESSIVE SYMPTOMS
I will STOP taking the medications listed below when I get home from the hospital:    Cipro 500 mg oral tablet  -- 1 tab(s) by mouth 2 times a day  -- Avoid prolonged or excessive exposure to direct and/or artificial sunlight while taking this medication.  Check with your doctor before becoming pregnant.  Do not take dairy products, antacids, or iron preparations within one hour of this medication.  Finish all this medication unless otherwise directed by prescriber.  Medication should be taken with plenty of water.    Flagyl 500 mg oral tablet  -- 1 tab(s) by mouth 3 times a day  -- Do not drink alcoholic beverages when taking this medication.  Finish all this medication unless otherwise directed by prescriber.  May discolor urine or feces.

## 2022-03-01 NOTE — BH SOCIAL WORK INITIAL PSYCHOSOCIAL EVALUATION - OTHER PAST PSYCHIATRIC HISTORY (INCLUDE DETAILS REGARDING ONSET, COURSE OF ILLNESS, INPATIENT/OUTPATIENT TREATMENT)
Pt's father reports pt has a hx of anxiety & depression since adolescence  no prior inpt hospitalizations, has been in outpt treatment with a provider , MD and therapist. Per EMR pt has no hx of SA,SI however endorses recent suicidal thoughts due to worsening anxiety, fears, has no substance abuse hx, no hx of AH/VH delusions, recent worsening paranoia after ranjit a stomach virus, has no hx of violent aggression towards others, no legal problems

## 2022-03-01 NOTE — BH INPATIENT PSYCHIATRY ASSESSMENT NOTE - NSBHCHARTREVIEWVS_PSY_A_CORE FT
Vital Signs Last 24 Hrs  T(C): 36.7 (03-01-22 @ 08:12), Max: 37.1 (02-28-22 @ 16:58)  T(F): 98.1 (03-01-22 @ 08:12), Max: 98.7 (02-28-22 @ 16:58)  HR: 84 (02-28-22 @ 16:58) (84 - 84)  BP: 126/87 (02-28-22 @ 16:58) (126/87 - 126/87)  BP(mean): --  RR: 18 (02-28-22 @ 16:58) (18 - 18)  SpO2: 99% (02-28-22 @ 16:58) (99% - 99%)    Orthostatic VS  03-01-22 @ 08:12  Lying BP: --/-- HR: --  Sitting BP: 143/81 HR: 100  Standing BP: --/-- HR: --  Site: --  Mode: --

## 2022-03-01 NOTE — PSYCHIATRIC REHAB INITIAL EVALUATION - NSBHEDUCURSCHOOL_PSY_ALL_CORE
As per chart, pt is a currently enrolled undergraduate student attending [employer]./Yes

## 2022-03-01 NOTE — BH INPATIENT PSYCHIATRY ASSESSMENT NOTE - RISK ASSESSMENT
risk factors for self-harm - Age, female sex, psychiatric diagnosis, family hx of mental illness  protective factors: family support, engaged in treatment, engaged in school, is future oriented currently in an inpatient setting with limited access to lethal means

## 2022-03-01 NOTE — BH SOCIAL WORK INITIAL PSYCHOSOCIAL EVALUATION - NSCMSPTSTRENGTHS_PSY_ALL_CORE
Able to adapt/Expressive of emotions/Flexibility/Highly motivated for treatment/Intact family/Intelligence/Interpersonal skills/Physically healthy/Strong support system/Supportive family

## 2022-03-01 NOTE — BH PSYCHOLOGY - GROUP THERAPY NOTE - NSPSYCHOLGRPGENPT_PSY_A_CORE FT
[employer] is an ACT-based group in which patients learn skills and explore themes of identity, compassion, resilience, and connection through the lens of marginalized identity. Group begins with setting group expectations and introductions that focus on identity exploration. Following introductions, the daily theme is presented through both didactics and experiential activities. Group today focused on the theme “connection.” Patients were provided psychoeducation about connection and engaged in discussion about the importance of developing healthy relationships.  led patients in an experiential exercise titled “Taking a Relationship Inventory” and helped patients to connect their values to their relationships.

## 2022-03-01 NOTE — BH INPATIENT PSYCHIATRY ASSESSMENT NOTE - HPI (INCLUDE ILLNESS QUALITY, SEVERITY, DURATION, TIMING, CONTEXT, MODIFYING FACTORS, ASSOCIATED SIGNS AND SYMPTOMS)
Chelo is a 21-year-old female, single, non-caregiver residing with her parents, currently attending [employer], with PPhx anxiety, panic and depression with long hx of outpatient treatment (currently in treatment with Dr. Morataya for the past 3 years - is rx'd Sertraline 150mg, Trazadone 25mg and Klonopin 0.5mg), no history of violence, no legal involvement, no self-injurious behaviors, no suicide attempts, no psychiatric hospitalizations and no access to guns/weapons, who presents to [facility] ED from the [facility] crisis center where she presented seeking voluntary admission for SI, depression and anxiety.     Patient reports struggling with episodic depression for 5 years with a waxing and waning pattern. Last reports being at her baseline 1 week ago where she was able to attend and participate in college classes, was eating appropriately, sleeping well, socially engaged. About a week ago, she reports having a stomach virus and since then, she reports having worsening low mood, anhedonia and not being able to go to classes, having low energy, erratic sleep pattern, and thoughts of wanting to not live and had an impulsive thought to crash her car yesterday. Denies having suicidal thoughts during interview, no HI, no legal / violence hx. pt reports being adherent to her outpatient med regimen -- Sertraline, Trazodone, birth control (doesn't know name - family bringing it in). Patient denies current or past psychotic symptoms. Reports anxiety and with panic-like symptoms. Denies manic sx including racing thoughts, decreased need for sleep, elevated energy, engaging in high risk behavior. Denies trauma hx and no sx of PTSD. Pt denies substance use including nicotine and alcohol. Patient seeking voluntary admission for symptom stabilizing and expresses interest in being transferred to  college unit.     Per ED notes:     "Patient interviewed in her room in  ED. She is very anxious and tearful, cooperative with interview. Reports long history of anxiety and depression, and states that her anxiety acutely worsened starting last week when she came down with a stomach bug. States that she had a panic attack today with tachycardia, increased RR, shaking, headache, dizziness. States that she has had 5 of these in her life with prior one 6 months ago. States that over the last week she has had worsening SI including thoughts of crashing her car (see more details of SI below), and she describes that today she no longer felt comfortable being by herself due to her SI and also due to her anxiety. Denies suicidal intent or taking actions toward ideas of suicide she has thought of. Report very poor appetite due to anxiety with 3 lb weight loss. Denies current suicidal intent and plan in the hospital. Denies current and historical sx of brielle and psychosis. Denies substance abuse recently and historically. Tried a shot of alcohol twice recently at recommendation of parents when her anxiety got very bad. Was started on 0.5 mg of Klonopin today by her psychiatrist but it only "made me woozy for a half hour and then my anxiety came back." See ED  notes for collateral from dad and psychiatrist.     Per [facility] crisis center eval today:  "Pt presents at [facility] for assessment requesting voluntary admission for worsening sx's of anxiety, panic, depression and SI for a period of 5 days. Pt rates both feelings of depression and anxiety as an "11" (Scale 0-10, 10=highest). Pt reports sx's have impacted level of functioning, reports she has been missing her college classes. Pt reports this morning hit her head against the table and has been digging her nails into her skin due to intensity of sx's. Pt reports crying, no appetite, not sleeping, over-thinking/excessive worry, trouble concentrating, feeling on edge/trouble relaxing, worthlessness/guilt/burden to family, isolating/withdrawing, low motivation and anhedonia. Pt reports physical sx's of shakiness, SOB, chest tightness and nausea. Pt reports experiencing SI currently with thought of "I don't want to be alive anymore." Pt reports SI has been intensifying since Friday 2/25/22. Pt reports loose methods of crashing car, putting self in front of a car or drowning self. Pt reports since yesterday she has been afraid to drive self b/c of SI. Pt denies past and present suicide rehearsals and aborted/interrupted suicide attempts. Pt denies past and present homicidal ideation. Pt denies past and present sx's of PTSD, OCD, psychosis and brielle. Pt reports no substance use/abuse, reports no hx of blackouts/withdrawals. PMH: Pt reports high cholesterol and IBS. Pt reports she is rx'd Atorvastatin 10mg. NKDA Pt presents as anxious and depressed with matching affect. Pt is tearful throughout assessment. Pt displays linear thought process, is oriented x3. Pt displays no perceptual disturbances. Pt's speech is wnl in rate, output and volume. Pt presents as help seeking. Undersigned provided psychoeducation regarding mental health sx's, different modalities of treatment (inpatient vs outpt vs php), the process of mental health tx and emergency procedures/resources to both pt and her father. Pt requests voluntary admission due to acuity of sx's and concern of safety. Undersigned called CI and was notified there at no available beds at [facility]. Undersigned discussed w/pt and father current status of beds at [facility] and going to [facility] ED for assistance with voluntary admission. Both pt and father agree with this plan. Pt and father decline EMS transfer. Undersigned provided verbal handoff to [facility] throughput nurse and behavioral health.  Undersigned rec'd notficiation from [facility] staff that pt arrived to [facility] ED."

## 2022-03-01 NOTE — PSYCHIATRIC REHAB INITIAL EVALUATION - NSBHALCSUBTREAT_PSY_ALL_CORE
As per chart, pt has long history of outpatient treatment (currently in treatment with Dr. Menendez for the past 3 yrs), and no prior psychiatric hospitalizations./Outpatient clinic (specify)

## 2022-03-01 NOTE — BH INPATIENT PSYCHIATRY ASSESSMENT NOTE - NSBHASSESSSUMMFT_PSY_ALL_CORE
20 yo female, currently domiciled with family, psych dx of like MDD with anxious distress, no prior hospitalizations, no prior suicide attempts, no substance use, currently a student at [employer] presenting with x1 week of worsening low mood which pt feels is triggered by a stomach virus last week     Patient reports 1 week of low mood , anhedonia, erratic sleep, poor energy, and thoughts to harm self that she feels is triggered by a stomach virus. PT reported ongoing compliance with medications despite having stomach virus. Currently in outpatient treatment. Exam notable for low mood with constricted anxious affect but denies suicidal ideation intent or plan and able to contract for safety in letting staff know if she has worsening thoughts of self-harm. Patient on Sertraline and Trazodone; will continue this. Patient takes birth control, doesn't know exact name; family bringing over. Patient expresses interest in being transferred to college unit. for now, will continue with outpatient meds and defer changes to receiving team on 1S.     Q15min obs   Sertraline 150mg daily  Trazodone 25mg qhs   haldol / ativan prn for agitation   coordinate with s/w

## 2022-03-02 PROCEDURE — 90834 PSYTX W PT 45 MINUTES: CPT

## 2022-03-02 PROCEDURE — 99232 SBSQ HOSP IP/OBS MODERATE 35: CPT | Mod: 25

## 2022-03-02 PROCEDURE — 90853 GROUP PSYCHOTHERAPY: CPT

## 2022-03-02 RX ORDER — SERTRALINE 25 MG/1
100 TABLET, FILM COATED ORAL DAILY
Refills: 0 | Status: DISCONTINUED | OUTPATIENT
Start: 2022-03-02 | End: 2022-03-04

## 2022-03-02 RX ORDER — CLONAZEPAM 1 MG
0.5 TABLET ORAL AT BEDTIME
Refills: 0 | Status: DISCONTINUED | OUTPATIENT
Start: 2022-03-02 | End: 2022-03-07

## 2022-03-02 RX ORDER — DULOXETINE HYDROCHLORIDE 30 MG/1
30 CAPSULE, DELAYED RELEASE ORAL DAILY
Refills: 0 | Status: DISCONTINUED | OUTPATIENT
Start: 2022-03-03 | End: 2022-03-04

## 2022-03-02 RX ADMIN — Medication 25 MILLIGRAM(S): at 21:30

## 2022-03-02 RX ADMIN — SERTRALINE 150 MILLIGRAM(S): 25 TABLET, FILM COATED ORAL at 10:00

## 2022-03-02 RX ADMIN — Medication 0.5 MILLIGRAM(S): at 10:01

## 2022-03-02 RX ADMIN — Medication 0.5 MILLIGRAM(S): at 21:30

## 2022-03-02 RX ADMIN — ATORVASTATIN CALCIUM 10 MILLIGRAM(S): 80 TABLET, FILM COATED ORAL at 21:30

## 2022-03-02 RX ADMIN — Medication 1 MILLIGRAM(S): at 16:21

## 2022-03-03 PROCEDURE — 90834 PSYTX W PT 45 MINUTES: CPT | Mod: 59

## 2022-03-03 PROCEDURE — 90853 GROUP PSYCHOTHERAPY: CPT

## 2022-03-03 PROCEDURE — 99232 SBSQ HOSP IP/OBS MODERATE 35: CPT

## 2022-03-03 RX ADMIN — Medication 25 MILLIGRAM(S): at 21:54

## 2022-03-03 RX ADMIN — SERTRALINE 100 MILLIGRAM(S): 25 TABLET, FILM COATED ORAL at 10:02

## 2022-03-03 RX ADMIN — DULOXETINE HYDROCHLORIDE 30 MILLIGRAM(S): 30 CAPSULE, DELAYED RELEASE ORAL at 10:03

## 2022-03-03 RX ADMIN — Medication 0.5 MILLIGRAM(S): at 21:53

## 2022-03-03 RX ADMIN — ATORVASTATIN CALCIUM 10 MILLIGRAM(S): 80 TABLET, FILM COATED ORAL at 21:53

## 2022-03-04 PROCEDURE — 90832 PSYTX W PT 30 MINUTES: CPT | Mod: 59

## 2022-03-04 PROCEDURE — 99232 SBSQ HOSP IP/OBS MODERATE 35: CPT

## 2022-03-04 PROCEDURE — 90853 GROUP PSYCHOTHERAPY: CPT

## 2022-03-04 RX ORDER — HYDROXYZINE HCL 10 MG
25 TABLET ORAL
Refills: 0 | Status: DISCONTINUED | OUTPATIENT
Start: 2022-03-04 | End: 2022-03-09

## 2022-03-04 RX ORDER — DULOXETINE HYDROCHLORIDE 30 MG/1
60 CAPSULE, DELAYED RELEASE ORAL DAILY
Refills: 0 | Status: DISCONTINUED | OUTPATIENT
Start: 2022-03-04 | End: 2022-03-09

## 2022-03-04 RX ORDER — SERTRALINE 25 MG/1
50 TABLET, FILM COATED ORAL DAILY
Refills: 0 | Status: DISCONTINUED | OUTPATIENT
Start: 2022-03-05 | End: 2022-03-05

## 2022-03-04 RX ADMIN — Medication 0.5 MILLIGRAM(S): at 20:28

## 2022-03-04 RX ADMIN — Medication 25 MILLIGRAM(S): at 20:28

## 2022-03-04 RX ADMIN — SERTRALINE 100 MILLIGRAM(S): 25 TABLET, FILM COATED ORAL at 08:54

## 2022-03-04 RX ADMIN — ATORVASTATIN CALCIUM 10 MILLIGRAM(S): 80 TABLET, FILM COATED ORAL at 20:29

## 2022-03-04 RX ADMIN — DULOXETINE HYDROCHLORIDE 30 MILLIGRAM(S): 30 CAPSULE, DELAYED RELEASE ORAL at 08:56

## 2022-03-04 NOTE — BH TREATMENT PLAN - NSTXDCOTHRINTERSW_PSY_ALL_CORE
SW spoke with pt about challenges she's been experiencing, pt could benefits from working on sx management skills, to discuss further with tx team

## 2022-03-05 PROCEDURE — 99231 SBSQ HOSP IP/OBS SF/LOW 25: CPT

## 2022-03-05 RX ORDER — TRAZODONE HCL 50 MG
50 TABLET ORAL AT BEDTIME
Refills: 0 | Status: DISCONTINUED | OUTPATIENT
Start: 2022-03-05 | End: 2022-03-06

## 2022-03-05 RX ADMIN — SERTRALINE 50 MILLIGRAM(S): 25 TABLET, FILM COATED ORAL at 08:38

## 2022-03-05 RX ADMIN — Medication 25 MILLIGRAM(S): at 12:41

## 2022-03-05 RX ADMIN — DULOXETINE HYDROCHLORIDE 60 MILLIGRAM(S): 30 CAPSULE, DELAYED RELEASE ORAL at 08:38

## 2022-03-05 RX ADMIN — ATORVASTATIN CALCIUM 10 MILLIGRAM(S): 80 TABLET, FILM COATED ORAL at 21:09

## 2022-03-05 RX ADMIN — Medication 50 MILLIGRAM(S): at 21:09

## 2022-03-05 RX ADMIN — Medication 0.5 MILLIGRAM(S): at 21:09

## 2022-03-06 RX ORDER — TRAZODONE HCL 50 MG
25 TABLET ORAL AT BEDTIME
Refills: 0 | Status: DISCONTINUED | OUTPATIENT
Start: 2022-03-06 | End: 2022-03-09

## 2022-03-06 RX ADMIN — ATORVASTATIN CALCIUM 10 MILLIGRAM(S): 80 TABLET, FILM COATED ORAL at 21:47

## 2022-03-06 RX ADMIN — Medication 25 MILLIGRAM(S): at 21:47

## 2022-03-06 RX ADMIN — DULOXETINE HYDROCHLORIDE 60 MILLIGRAM(S): 30 CAPSULE, DELAYED RELEASE ORAL at 09:20

## 2022-03-07 PROCEDURE — 90832 PSYTX W PT 30 MINUTES: CPT

## 2022-03-07 PROCEDURE — 99231 SBSQ HOSP IP/OBS SF/LOW 25: CPT

## 2022-03-07 RX ADMIN — Medication 650 MILLIGRAM(S): at 16:28

## 2022-03-07 RX ADMIN — Medication 650 MILLIGRAM(S): at 13:55

## 2022-03-07 RX ADMIN — Medication 25 MILLIGRAM(S): at 21:42

## 2022-03-07 RX ADMIN — ATORVASTATIN CALCIUM 10 MILLIGRAM(S): 80 TABLET, FILM COATED ORAL at 21:42

## 2022-03-07 RX ADMIN — DULOXETINE HYDROCHLORIDE 60 MILLIGRAM(S): 30 CAPSULE, DELAYED RELEASE ORAL at 10:01

## 2022-03-07 NOTE — BH CHART NOTE - NSEVENTNOTEFT_PSY_ALL_CORE
Called father at [phone]:  Father denies any concerns with the plan for discharge on Wednesday and is able to contract for safety. Denies any guns/lethal weapons are present in the home. Agrees with the plan to follow-up with the outpatient provider for further medication management. Discussed safety planning and to call the outpatient psychiatrist and/or 911 if the patient expresses any danger to self or others in the future.

## 2022-03-08 PROCEDURE — 99231 SBSQ HOSP IP/OBS SF/LOW 25: CPT

## 2022-03-08 PROCEDURE — 99238 HOSP IP/OBS DSCHRG MGMT 30/<: CPT | Mod: 1L

## 2022-03-08 PROCEDURE — 90853 GROUP PSYCHOTHERAPY: CPT

## 2022-03-08 PROCEDURE — 90834 PSYTX W PT 45 MINUTES: CPT | Mod: 59

## 2022-03-08 RX ORDER — TRAZODONE HCL 50 MG
0 TABLET ORAL
Qty: 0 | Refills: 0 | DISCHARGE

## 2022-03-08 RX ORDER — ATORVASTATIN CALCIUM 80 MG/1
0 TABLET, FILM COATED ORAL
Qty: 0 | Refills: 0 | DISCHARGE

## 2022-03-08 RX ORDER — DULOXETINE HYDROCHLORIDE 30 MG/1
1 CAPSULE, DELAYED RELEASE ORAL
Qty: 30 | Refills: 0
Start: 2022-03-08 | End: 2022-04-06

## 2022-03-08 RX ORDER — HYDROXYZINE HCL 10 MG
1 TABLET ORAL
Qty: 90 | Refills: 0
Start: 2022-03-08 | End: 2022-04-06

## 2022-03-08 RX ORDER — TRAZODONE HCL 50 MG
0.5 TABLET ORAL
Qty: 15 | Refills: 0
Start: 2022-03-08 | End: 2022-04-06

## 2022-03-08 RX ORDER — SERTRALINE 25 MG/1
0 TABLET, FILM COATED ORAL
Qty: 0 | Refills: 0 | DISCHARGE

## 2022-03-08 RX ORDER — ATORVASTATIN CALCIUM 80 MG/1
1 TABLET, FILM COATED ORAL
Qty: 0 | Refills: 0 | DISCHARGE
Start: 2022-03-08

## 2022-03-08 RX ADMIN — Medication 25 MILLIGRAM(S): at 21:29

## 2022-03-08 RX ADMIN — DULOXETINE HYDROCHLORIDE 60 MILLIGRAM(S): 30 CAPSULE, DELAYED RELEASE ORAL at 09:06

## 2022-03-08 RX ADMIN — Medication 25 MILLIGRAM(S): at 12:15

## 2022-03-08 RX ADMIN — ATORVASTATIN CALCIUM 10 MILLIGRAM(S): 80 TABLET, FILM COATED ORAL at 21:29

## 2022-03-08 NOTE — BH DISCHARGE NOTE NURSING/SOCIAL WORK/PSYCH REHAB - NSDCPRRECOMMEND_PSY_ALL_CORE
Pt would benefit from following up with individual therapy for depressive and anxious symptoms. Using self compassion for guilt. Opposite action for depressive symptoms.

## 2022-03-08 NOTE — BH DISCHARGE NOTE NURSING/SOCIAL WORK/PSYCH REHAB - NSDCADDINFO1FT_PSY_ALL_CORE
Please be informed that your discharge summary will be faxed as soon as it becomes available to your psychiatrist.

## 2022-03-08 NOTE — BH INPATIENT PSYCHIATRY DISCHARGE NOTE - HOSPITAL COURSE
Patient is a 21-year-old female with a history of major depressive disorder, anxiety, and panic attacks who presents from [facility] for symptoms of a major depressive episode for one week. This occurred after experiencing a G.I. illness. She also expresses passive suicidal ideation, and has been afraid to drive due to concerns that she may attempt to harm herself. Given her recent depression and suicidality, she has a danger to herself and meets criteria for inpatient psychiatric hospitalization.  PsyHx: Sees Dr. Morataya x 3 years. No hosp. No SA.  SH: Lives with parents, attends [employer]    Patient was cross-titrated from sertraline to duloxetine, at a final dose of 60 mg daily. Risks, benefits, and side effects of all medication prescribed were discussed in detail, including the FDA-issued black box warning relaying the increased risk of suicidality for antidepressants in patients under the age of 24.    Over the course of hospitalization, the patient's anxiety and depression improved gradually. She was an active participant in individual and group therapy. She notes that her episodes of anxiety are short-lived and severe, and that they have interfered with her ability to pursue her interests. She was able to identify numerous coping skills by the end of hospitalization.   No medical issues, restraints, or self-injurious behavior noted during the hospitalization.    At the time of discharge, the patient reported improved mood, exhibited a euthymic affect, and denied SI/HI/AVH or desire to self-harm. The patient was future-oriented with respect to her schooling. The patient denied any intolerable side effects and agreed with the plan for discharge due to the patient’s confidence that treatment could be successfully continued as an outpatient.    Risk assessment:  On admission, acute risk factors included: Suicidal ideation, major depressive episode  Chronic risk factors included: Diagnosis of MDD    Acute risk factors were mitigated during hospitalization and overall risk had returned to baseline levels by the time of discharge. However, the patient remains at elevated risk of suicide given chronic risk factors, which would not be reduced further by continued hospitalization and are best managed on an outpatient basis. In addition, the patient has numerous protective factors, including treatment adherence, close involvement of family throughout hospitalization, limited access to lethal means (reported by family and patient), social support, forward thinking regarding school/career, high premorbid functioning, presence of pets/children at home, and spirituality. The patient was able to engage in safety planning, and neither the patient nor family identified any barriers to discharge.   Therefore, the patient no longer met criteria for inpatient psychiatric hospitalization and was discharged from the 1S unit.     DSM-5 diagnosis:  MDD with anxious distress    Discharge medication:  - Duloxetine 60 mg daily

## 2022-03-08 NOTE — BH PSYCHOLOGY - CLINICIAN PSYCHOTHERAPY NOTE - NSTXDCOTHRGOAL_PSY_ALL_CORE
Improved symptom management

## 2022-03-08 NOTE — BH DISCHARGE NOTE NURSING/SOCIAL WORK/PSYCH REHAB - NSDCPRGOAL_PSY_ALL_CORE
During the current hospitalization, patient has been addressing psychiatric rehabilitation goals pertaining to identifying coping skills that assist in improving mood and decreasing suicidal ideation. Patient has demonstrated progress towards psychiatric rehabilitation goals during the current hospitalization. Patient exhibited progress through participating in individual and group therapy and developing additional coping skills to assist with improving mood and anxiety. Pt was able to identify warning signs to prevent relapse. Pt has been managing negative emotions with radical acceptance, emotional regulation, positive distraction and self soothing skills. Pt utilized coping skills such as opposite action, willingness, journaling, jake, prayer, radical acceptance, grounding, DBT TIPP, ART/coloring, journaling, meditation, self- soothing. Writer encouraged patient to continue to strengthen and practice effective skills. Patient met goal of identifying coping skills as evidenced by reporting these skills have helped her during current hospitalization. Patient was compliant with medication during current hospitalization. Patient was provided with a Press Ganey survey prior to discharge. Pt identified her [employer] jake and career as protective factors.

## 2022-03-08 NOTE — BH INPATIENT PSYCHIATRY DISCHARGE NOTE - HPI (INCLUDE ILLNESS QUALITY, SEVERITY, DURATION, TIMING, CONTEXT, MODIFYING FACTORS, ASSOCIATED SIGNS AND SYMPTOMS)
Chelo is a 21-year-old female, single, non-caregiver residing with her parents, currently attending [employer], with PPhx anxiety, panic and depression with long hx of outpatient treatment (currently in treatment with Dr. Morataya for the past 3 years - is rx'd Sertraline 150mg, Trazadone 25mg and Klonopin 0.5mg), no history of violence, no legal involvement, no self-injurious behaviors, no suicide attempts, no psychiatric hospitalizations and no access to guns/weapons, who presents to [facility] ED from the [facility] crisis center where she presented seeking voluntary admission for SI, depression and anxiety.     Patient reports struggling with episodic depression for 5 years with a waxing and waning pattern. Last reports being at her baseline 1 week ago where she was able to attend and participate in college classes, was eating appropriately, sleeping well, socially engaged. About a week ago, she reports having a stomach virus and since then, she reports having worsening low mood, anhedonia and not being able to go to classes, having low energy, erratic sleep pattern, and thoughts of wanting to not live and had an impulsive thought to crash her car yesterday. Denies having suicidal thoughts during interview, no HI, no legal / violence hx. pt reports being adherent to her outpatient med regimen -- Sertraline, Trazodone, birth control (doesn't know name - family bringing it in). Patient denies current or past psychotic symptoms. Reports anxiety and with panic-like symptoms. Denies manic sx including racing thoughts, decreased need for sleep, elevated energy, engaging in high risk behavior. Denies trauma hx and no sx of PTSD. Pt denies substance use including nicotine and alcohol. Patient seeking voluntary admission for symptom stabilizing and expresses interest in being transferred to  college unit.     Per ED notes:     "Patient interviewed in her room in  ED. She is very anxious and tearful, cooperative with interview. Reports long history of anxiety and depression, and states that her anxiety acutely worsened starting last week when she came down with a stomach bug. States that she had a panic attack today with tachycardia, increased RR, shaking, headache, dizziness. States that she has had 5 of these in her life with prior one 6 months ago. States that over the last week she has had worsening SI including thoughts of crashing her car (see more details of SI below), and she describes that today she no longer felt comfortable being by herself due to her SI and also due to her anxiety. Denies suicidal intent or taking actions toward ideas of suicide she has thought of. Report very poor appetite due to anxiety with 3 lb weight loss. Denies current suicidal intent and plan in the hospital. Denies current and historical sx of brielle and psychosis. Denies substance abuse recently and historically. Tried a shot of alcohol twice recently at recommendation of parents when her anxiety got very bad. Was started on 0.5 mg of Klonopin today by her psychiatrist but it only "made me woozy for a half hour and then my anxiety came back." See ED  notes for collateral from dad and psychiatrist.     Per [facility] crisis center eval today:  "Pt presents at [facility] for assessment requesting voluntary admission for worsening sx's of anxiety, panic, depression and SI for a period of 5 days. Pt rates both feelings of depression and anxiety as an "11" (Scale 0-10, 10=highest). Pt reports sx's have impacted level of functioning, reports she has been missing her college classes. Pt reports this morning hit her head against the table and has been digging her nails into her skin due to intensity of sx's. Pt reports crying, no appetite, not sleeping, over-thinking/excessive worry, trouble concentrating, feeling on edge/trouble relaxing, worthlessness/guilt/burden to family, isolating/withdrawing, low motivation and anhedonia. Pt reports physical sx's of shakiness, SOB, chest tightness and nausea. Pt reports experiencing SI currently with thought of "I don't want to be alive anymore." Pt reports SI has been intensifying since Friday 2/25/22. Pt reports loose methods of crashing car, putting self in front of a car or drowning self. Pt reports since yesterday she has been afraid to drive self b/c of SI. Pt denies past and present suicide rehearsals and aborted/interrupted suicide attempts. Pt denies past and present homicidal ideation. Pt denies past and present sx's of PTSD, OCD, psychosis and brielle. Pt reports no substance use/abuse, reports no hx of blackouts/withdrawals. PMH: Pt reports high cholesterol and IBS. Pt reports she is rx'd Atorvastatin 10mg. NKDA Pt presents as anxious and depressed with matching affect. Pt is tearful throughout assessment. Pt displays linear thought process, is oriented x3. Pt displays no perceptual disturbances. Pt's speech is wnl in rate, output and volume. Pt presents as help seeking. Undersigned provided psychoeducation regarding mental health sx's, different modalities of treatment (inpatient vs outpt vs php), the process of mental health tx and emergency procedures/resources to both pt and her father. Pt requests voluntary admission due to acuity of sx's and concern of safety. Undersigned called CI and was notified there at no available beds at [facility]. Undersigned discussed w/pt and father current status of beds at [facility] and going to [facility] ED for assistance with voluntary admission. Both pt and father agree with this plan. Pt and father decline EMS transfer. Undersigned provided verbal handoff to [facility] throughput nurse and behavioral health.  Undersigned rec'd notficiation from [facility] staff that pt arrived to [facility] ED."

## 2022-03-08 NOTE — BH PSYCHOLOGY - CLINICIAN PSYCHOTHERAPY NOTE - NSTXDEPRESGOAL_PSY_ALL_CORE
Exhibit improvements in self-grooming, hygiene, sleep and appetite

## 2022-03-08 NOTE — BH INPATIENT PSYCHIATRY DISCHARGE NOTE - NSDCMRMEDTOKEN_GEN_ALL_CORE_FT
atorvastatin 10 mg oral tablet: 1 tab(s) orally once a day (at bedtime)  DULoxetine 60 mg oral delayed release capsule: 1 cap(s) orally once a day  freetext medication     -: 1 tab(s) orally once a day (at bedtime)  hydrOXYzine hydrochloride 25 mg oral tablet: 1 tab(s) orally 3 times a day, As Needed -Anxiety   traZODone 50 mg oral tablet: 0.5 tab(s) orally once a day (at bedtime)

## 2022-03-08 NOTE — BH INPATIENT PSYCHIATRY DISCHARGE NOTE - NSBHMETABOLIC_PSY_ALL_CORE_FT
BMI:   HbA1c: A1C with Estimated Average Glucose Result: 4.7 % (03-01-22 @ 10:31)    Glucose:   BP: --  Lipid Panel: Date/Time: 03-01-22 @ 10:31  Cholesterol, Serum: 164  Direct LDL: --  HDL Cholesterol, Serum: 61  Total Cholesterol/HDL Ration Measurement: --  Triglycerides, Serum: 110

## 2022-03-08 NOTE — BH PSYCHOLOGY - CLINICIAN PSYCHOTHERAPY NOTE - NSBHPSYCHOLNARRATIVE_PSY_A_CORE FT
Patient was alert, cooperative, and in control. Oriented x3. Casually dressed, fairly groomed. Maintained good eye contact. Speech normal in production, rate, volume, and tone. No abnormal psychomotor behavior. Mood "anxious" with congruent affect, tearful at times. Thought process logical, goal-directed. Thought content relevant to discussion. Denied auditory/visual hallucinations and current suicidal/homicidal ideation, intent, plan, and urges to self-harm. Committed to remaining safe on the unit and informing staff if unable to manage behaviors. Impulse control intact. Insight and judgment fair.    Patient reported adjusting to the unit with improvement in appetite and not feeling as nauseous, expressed some difficulty falling asleep and staying asleep, waking up anxious. She denied suicidality, plan, and intent both in and out of the hospital. Patient expressed fear regarding her symptoms, stating that her anxiety might resolve in the hospital "but what happens when I leave here." She also expressed anxiety about the distant future, "what if this happens again? I don't know if I can deal with it." She also stated "It's easier when I know the trigger, but right now I can't find one." Discussed radical acceptance and impermanence of emotions. Also discussed opposite action and distract skill. Patient expressed willingness to practice, although stated "I'm worried it might not work." 
Patient was alert, cooperative, and in control. Oriented x3. Casually dressed, fairly groomed. Maintained good eye contact. Speech normal in production, rate, volume, and tone. No abnormal psychomotor behavior. Mood "good, anxious about leaving" with congruent affect, tearful at times. Thought process logical, goal-directed. Thought content relevant to discussion. Denied auditory/visual hallucinations and current suicidal/homicidal ideation, intent, plan, and urges to self-harm. Committed to remaining safe on the unit and informing staff if unable to manage behaviors. Impulse control intact. Insight and judgment fair.    Patient reported feeling prepared for discharge and also expressed discharge related anxiety. Patient was able to check the facts and identify opposite actions to help manage this anxiety. Patient was also able to use ice and practice deep breathing to help regulate herself in session. She was able to identify reasons to live and how to find meaning in her hospitalization. Session also focused on safety planning. Patient was able to identify appropriate items for each section. Please see Patient Safety Plan for further details. 
Patient was alert, cooperative, and in control. Oriented x3. Casually dressed, fairly groomed. Maintained good eye contact. Speech normal in production, rate, volume, and tone. No abnormal psychomotor behavior. Mood "good, I feel like myself" with congruent affect. Thought process logical, goal-directed. Thought content relevant to discussion. Denied auditory/visual hallucinations and current suicidal/homicidal ideation, intent, plan, and urges to self-harm. Committed to remaining safe on the unit and informing staff if unable to manage behaviors. Impulse control intact. Insight and judgment fair.    Patient reported doing well overall with improvement in anxiety and depressive symptoms, denied suicidality, has been interacting with peers, attending group, and learning skills. She discussed some drowsiness due to medication adjustments but overall expressed motivation for discharge. She stated that she is more confident in her ability to manage her anxiety when in the community, although she discussed anxiety about feeling anxious on the train when commuting to school. Reviewed [place] Ahead and Opposite Action and rationale for skills. Patient believes that will be helpful and agreed to complete worksheet.
Patient was alert, cooperative, and in control. Oriented x3. Casually dressed, fairly groomed. Maintained good eye contact. Speech normal in production, rate, volume, and tone. No abnormal psychomotor behavior. Mood "frustrated" with congruent affect. Thought process logical, goal-directed. Thought content relevant to discussion. Denied auditory/visual hallucinations and current suicidal/homicidal ideation, intent, plan, and urges to self-harm. Committed to remaining safe on the unit and informing staff if unable to manage behaviors. Impulse control intact. Insight and judgment fair.    Session focused on building rapport and identifying factors that contributed to hospitalization. Overall, patient stated that she had been doing well and managing her usual stressors effectively. Patient reported increased anxiety following a stomach virus last week. Patient stated that physiological symptoms were similar to her anxiety symptoms, which she continued to experience once virus resolved. Patient expressed fear that she will "never get better" and if symptoms were to resolve, "what if they came back again?" These experiences contributed to suicidal ideation with lose plans of crashing her car. Patient stated she has not made any suicide attempts and has not engaged in self-injurious behavior. She also stated she does not use substances and does not have a history of trauma. Patient reported feeling close with her family and also has two close friends. Provided DBT psychoeducation. Also discussed TIPP and self-soothe and engaged in grounding exercise in session. Patient expressed willingness to engage in treatment and agreed to practice skills.
Patient was alert, cooperative, and in control. Oriented x3. Casually dressed, fairly groomed. Maintained good eye contact. Speech normal in production, rate, volume, and tone. No abnormal psychomotor behavior. Mood "anxious, depressed" with congruent affect, tearful at times. Thought process logical, goal-directed. Thought content relevant to discussion. Denied auditory/visual hallucinations and current suicidal/homicidal ideation, intent, plan, and urges to self-harm. Committed to remaining safe on the unit and informing staff if unable to manage behaviors. Impulse control intact. Insight and judgment fair.    Patient reported continued depression ("8 out of 10") and anxiety ("It fluctuates. Worse in the morning and at night"), denied suicidality. Patient stated that she has been reviewing skills, practiced opposite action yesterday after noticing urge to isolation and instead stayed in the community. She discussed anxiety surrounding physiological symptoms, some difficulty challenging unhelpful thoughts and reframing them when she does not know the trigger and they suddenly appear. Discussed mindfulness WHAT and HOW skills. Engaged in mindfulness exercise in session. Patient was able to discuss her experience and how it would be helpful when experiencing physiological distress. Encouraged patient to practice.

## 2022-03-08 NOTE — BH PSYCHOLOGY - CLINICIAN PSYCHOTHERAPY NOTE - NSBHPSYCHOLPROBS_PSY_ALL_CORE
Anxiety/Depression/Suicidality

## 2022-03-08 NOTE — BH INPATIENT PSYCHIATRY DISCHARGE NOTE - NSDCCPCAREPLAN_GEN_ALL_CORE_FT
PRINCIPAL DISCHARGE DIAGNOSIS  Diagnosis: Major depressive disorder, recurrent episode, severe with anxious distress  Assessment and Plan of Treatment:

## 2022-03-08 NOTE — BH INPATIENT PSYCHIATRY DISCHARGE NOTE - DESCRIPTION
youngest of 4 siblings. Lives with parents in [place]. 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 Reports being in honors classes in high school and graduated on time.

## 2022-03-08 NOTE — BH DISCHARGE NOTE NURSING/SOCIAL WORK/PSYCH REHAB - NSCDUDCCRISIS_PSY_A_CORE
Replaced by [facility]  1 ([phone]) Replaced by [facility] [phone])  Text "WELL" to [phone]  Website: www.URX/.[facility] 1 (800) 951-EBRS (3502) Website: www.safehorizon.org/.National Suicide Prevention Lifeline 1 (474) 968-0023/.  [facility]  1 (328) LIFENET (772-9757)/.  [employer]  [phone] [place] [id number] [phone]   Hours:  Monday through Friday from 9 AM to 3 PM/.  [place] [employer] - [facility] Crisis Line  3 [phone], Option 1

## 2022-03-08 NOTE — BH SAFETY PLAN - STEP 6 SAFE ENVIRONMENT
1. Going to therapy more often (weekly)  2. Communicate my feelings as they're building  3. Ask parents to check in with me in the morning and night  4. Stay around family

## 2022-03-08 NOTE — BH DISCHARGE NOTE NURSING/SOCIAL WORK/PSYCH REHAB - PATIENT PORTAL LINK FT
You can access the FollowMyHealth Patient Portal offered by [facility] by registering at the following website: [email or web address]. By joining [facility]’s FollowMyHealth portal, you will also be able to view your health information using other applications (apps) compatible with our system.

## 2022-03-08 NOTE — BH PSYCHOLOGY - CLINICIAN PSYCHOTHERAPY NOTE - NSBHPSYCHOLBILLFAM_PSY_A_CORE
41322 - 38 to 52 minutes
[id number] - 38 to 52 minutes
28038 - 16 to 37 minutes
05085 - 38 to 52 minutes
16636 - 16 to 37 minutes

## 2022-03-08 NOTE — BH DISCHARGE NOTE NURSING/SOCIAL WORK/PSYCH REHAB - DISCHARGE INSTRUCTIONS AFTERCARE APPOINTMENTS
In order to check the location, date, or time of your aftercare appointment, please refer to your Discharge Instructions Document given to you upon leaving the hospital.  If you have lost the instructions please call [phone]

## 2022-03-09 VITALS — TEMPERATURE: 98 F

## 2022-03-09 PROCEDURE — 99231 SBSQ HOSP IP/OBS SF/LOW 25: CPT | Mod: 1L

## 2022-03-09 PROCEDURE — 90853 GROUP PSYCHOTHERAPY: CPT

## 2022-03-09 RX ADMIN — DULOXETINE HYDROCHLORIDE 60 MILLIGRAM(S): 30 CAPSULE, DELAYED RELEASE ORAL at 09:20

## 2022-03-09 NOTE — BH INPATIENT PSYCHIATRY PROGRESS NOTE - CURRENT MEDICATION
MEDICATIONS  (STANDING):  atorvastatin 10 milliGRAM(s) Oral at bedtime  DULoxetine 60 milliGRAM(s) Oral daily  traZODone 25 milliGRAM(s) Oral at bedtime  Vestura (drospirenone/ethinyl estradiol) 1 Tablet(s) 1 Tablet(s) Oral at bedtime    MEDICATIONS  (PRN):  acetaminophen     Tablet .. 650 milliGRAM(s) Oral every 6 hours PRN Mild Pain (1 - 3), Moderate Pain (4 - 6)  hydrOXYzine hydrochloride 25 milliGRAM(s) Oral four times a day PRN Anxiety  LORazepam     Tablet 1 milliGRAM(s) Oral every 6 hours PRN anxiety/agitation  LORazepam   Injectable 2 milliGRAM(s) IntraMuscular once PRN severe agitation  
MEDICATIONS  (STANDING):  atorvastatin 10 milliGRAM(s) Oral at bedtime  DULoxetine 60 milliGRAM(s) Oral daily  traZODone 25 milliGRAM(s) Oral at bedtime  Vestura (drospirenone/ethinyl estradiol) 1 Tablet(s) 1 Tablet(s) Oral at bedtime    MEDICATIONS  (PRN):  acetaminophen     Tablet .. 650 milliGRAM(s) Oral every 6 hours PRN Mild Pain (1 - 3), Moderate Pain (4 - 6)  hydrOXYzine hydrochloride 25 milliGRAM(s) Oral four times a day PRN Anxiety  LORazepam     Tablet 1 milliGRAM(s) Oral every 6 hours PRN anxiety/agitation  LORazepam   Injectable 2 milliGRAM(s) IntraMuscular once PRN severe agitation  
MEDICATIONS  (STANDING):  atorvastatin 10 milliGRAM(s) Oral at bedtime  clonazePAM  Tablet 0.5 milliGRAM(s) Oral at bedtime  DULoxetine 60 milliGRAM(s) Oral daily  influenza   Vaccine 0.5 milliLiter(s) IntraMuscular once  traZODone 25 milliGRAM(s) Oral at bedtime  Vestura (drospirenone/ethinyl estradiol) 1 Tablet(s) 1 Tablet(s) Oral at bedtime    MEDICATIONS  (PRN):  acetaminophen     Tablet .. 650 milliGRAM(s) Oral every 6 hours PRN Mild Pain (1 - 3), Moderate Pain (4 - 6)  hydrOXYzine hydrochloride 25 milliGRAM(s) Oral four times a day PRN Anxiety  LORazepam     Tablet 1 milliGRAM(s) Oral every 6 hours PRN anxiety/agitation  LORazepam   Injectable 2 milliGRAM(s) IntraMuscular once PRN severe agitation  
MEDICATIONS  (STANDING):  atorvastatin 10 milliGRAM(s) Oral at bedtime  clonazePAM  Tablet 0.5 milliGRAM(s) Oral at bedtime  influenza   Vaccine 0.5 milliLiter(s) IntraMuscular once  sertraline 100 milliGRAM(s) Oral daily  traZODone 25 milliGRAM(s) Oral at bedtime  Vestura (drospirenone/ethinyl estradiol) 1 Tablet(s) 1 Tablet(s) Oral at bedtime    MEDICATIONS  (PRN):  acetaminophen     Tablet .. 650 milliGRAM(s) Oral every 6 hours PRN Mild Pain (1 - 3), Moderate Pain (4 - 6)  LORazepam     Tablet 1 milliGRAM(s) Oral every 4 hours PRN anxiety/agitation  LORazepam   Injectable 2 milliGRAM(s) IntraMuscular once PRN severe agitation  
MEDICATIONS  (STANDING):  atorvastatin 10 milliGRAM(s) Oral at bedtime  DULoxetine 60 milliGRAM(s) Oral daily  traZODone 25 milliGRAM(s) Oral at bedtime  Vestura (drospirenone/ethinyl estradiol) 1 Tablet(s) 1 Tablet(s) Oral at bedtime    MEDICATIONS  (PRN):  acetaminophen     Tablet .. 650 milliGRAM(s) Oral every 6 hours PRN Mild Pain (1 - 3), Moderate Pain (4 - 6)  hydrOXYzine hydrochloride 25 milliGRAM(s) Oral four times a day PRN Anxiety  LORazepam     Tablet 1 milliGRAM(s) Oral every 6 hours PRN anxiety/agitation  LORazepam   Injectable 2 milliGRAM(s) IntraMuscular once PRN severe agitation  
MEDICATIONS  (STANDING):  atorvastatin 10 milliGRAM(s) Oral at bedtime  clonazePAM  Tablet 0.5 milliGRAM(s) Oral at bedtime  DULoxetine 60 milliGRAM(s) Oral daily  influenza   Vaccine 0.5 milliLiter(s) IntraMuscular once  traZODone 25 milliGRAM(s) Oral at bedtime  Vestura (drospirenone/ethinyl estradiol) 1 Tablet(s) 1 Tablet(s) Oral at bedtime    MEDICATIONS  (PRN):  acetaminophen     Tablet .. 650 milliGRAM(s) Oral every 6 hours PRN Mild Pain (1 - 3), Moderate Pain (4 - 6)  hydrOXYzine hydrochloride 25 milliGRAM(s) Oral four times a day PRN Anxiety  LORazepam     Tablet 1 milliGRAM(s) Oral every 6 hours PRN anxiety/agitation  LORazepam   Injectable 2 milliGRAM(s) IntraMuscular once PRN severe agitation  
MEDICATIONS  (STANDING):  atorvastatin 10 milliGRAM(s) Oral at bedtime  clonazePAM  Tablet 0.5 milliGRAM(s) Oral at bedtime  DULoxetine 30 milliGRAM(s) Oral daily  influenza   Vaccine 0.5 milliLiter(s) IntraMuscular once  sertraline 100 milliGRAM(s) Oral daily  traZODone 25 milliGRAM(s) Oral at bedtime  Vestura (drospirenone/ethinyl estradiol) 1 Tablet(s) 1 Tablet(s) Oral at bedtime    MEDICATIONS  (PRN):  acetaminophen     Tablet .. 650 milliGRAM(s) Oral every 6 hours PRN Mild Pain (1 - 3), Moderate Pain (4 - 6)  LORazepam     Tablet 1 milliGRAM(s) Oral every 6 hours PRN anxiety/agitation  LORazepam   Injectable 2 milliGRAM(s) IntraMuscular once PRN severe agitation

## 2022-03-09 NOTE — BH INPATIENT PSYCHIATRY PROGRESS NOTE - NSBHFUPINTERVALHXFT_PSY_A_CORE
Chart reviewed, including vital signs, medication administration record, lab results, and nursing notes.   Case discussed with nursing team. Pt had some trouble sleeping last night and needed klonopin and trazodone to fall asleep.     Patient is seen in group room, in no acute distress, amenable to interview. Notably, the patient’s affect is much brighter today, and she states that she is feeling like herself. She is an unsure about the cause for her improvement. She is contemplating taking time off from school, because she’s concerned about having missed too much class. She reports no medication side effects. Reports stable sleep and appetite. Denies SI/HI/AVH. She agrees with the plan for Klonopin to be discontinued due to oversedation.
Chart reviewed, including vital signs, medication administration record, lab results, and nursing notes.   Case discussed with nursing, psychology, psych rehab, and social work in team meeting.     Patient is seen in group room, in no acute distress, amenable to interview. She acknowledges coming to the hospital due to depressive symptoms and passive suicidal ideation for the past week. She reports increased sleep, anhedonia, decreased energy, decreased concentration, poor appetite, and passive suicidal ideation. She reports no plan or intent to end her life, but states that she has been afraid to drive a car due to concerns that she might attempt suicide while driving. States that she has been taking Zoloft for the past three years, and states that it was effective at first, but she is agreeable to switching the medication dose at this time. States that her last major depressive episode was six months ago. She acknowledges seeing a psychiatrist and a therapist. She also reports significant symptoms of anxiety that are physical, including stomach pain and hyperventilation. 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     Denies history of brielle/hypo brielle, psychosis, trauma, substance use, or suicidality. Reports a family history of depression and anxiety. States that she previously took Lexapro, which caused her to experience vivid dreams. She was also on amitriptyline for irritable bowel syndrome and psychiatric issues, but states that this was not effective either. She reports that Klonopin was recently started, and she hopes to take it twice daily. We discussed the plan to start the anti-depressant Cymbalta and also move Klonopin to nighttime administration. She currently denies SI/HI/AVH.
Chart reviewed, including vital signs, medication administration record, lab results, and nursing notes.   Case discussed with nursing team    Patient is seen in the day room, in no acute distress, amenable to interview. States that she is doing better, and she feels slightly nervous about discharge today. States that she is looking forward to drinking coffee and having a shower. She reports stable sleep and appetite. Denies medication side effects. Denies SI/HI/AVH.
Chart reviewed, including vital signs, medication administration record, lab results, and nursing notes.   Case discussed with nursing, psychology, psych rehab, and social work in team meeting.     Patient is seen in the group room, in no acute distress, amenable to interview. States that she feels like she is “existing“. She acknowledges experiencing episodic anxiety that is very debilitating, which have stymied her plans to immigrate to [place]. She feels that she is unable to function without the support of her family. Overall, she continues to experience high levels of anxiety, that have improved slightly since hospitalization. She reports feeling anxiety yesterday, but refrained from asking for Ativan, because she does not want to be reliant on this medication. Reports stable sleep and appetite. Denies medication side effects. Denies SI/HI/AVH. We discussed the plan to increase the dose of Cymbalta today and decrease the dose of Zoloft, as well as provide PRN atarax.
Chart reviewed, including vital signs, medication administration record, lab results, and nursing notes.   Case discussed with nursing team. Pt had some trouble sleeping last night and needed klonopin and trazodone to fall asleep.     Patient is seen in the group room, in no acute distress, amenable to interview. She reports feeling somewhat nervous about discharge, but agrees that it “just needs to happen“. She denies SI/HI/AVH. She denies medication side effects. Reports stable sleep and appetite.    Called father Jacinto Medina at [phone]:  Father denies any concerns with the plan for discharge and is able to contract for safety. Denies any guns/lethal weapons are present in the home. Agrees with the plan to follow-up with the outpatient provider for further medication management. Discussed safety planning and to call the outpatient psychiatrist and/or 911 if the patient expresses any danger to self or others in the future.   
Chart reviewed, including vital signs, medication administration record, lab results, and nursing notes.   Case discussed with nursing, psychology, psych rehab, and social work in team meeting.     Patient is seen in the day room, in no acute distress, amenable to interview. States that she is feeling better today, and rates her depression at 7 out of 10 in severity. States that it was 11/10 in severity at the beginning of hospitalization. States that she has been attending individual therapy, and learning coping skills to calm her physical symptoms. She agrees to speak with the Rabbi, and requests that her parents bring prayer books. Reports stable sleep and appetite. Denies medication side effects. Denies SI/HI/AVH. We discussed the plan to continue the cross titration from Zoloft to Cymbalta.
Chart reviewed, including vital signs, medication administration record, lab results, and nursing notes.   Case discussed with nursing team. Pt had some trouble sleeping last night and needed klonopin and trazodone to fall asleep.     On interview, pt says that she feels "strange" and feels much more anxious and depressed today. She says that her sleep was interrupted last night and that she just doesn't feel well today. She denies any SI. She ate breakfast.

## 2022-03-09 NOTE — BH INPATIENT PSYCHIATRY PROGRESS NOTE - NSCGISEVERILLNESS_PSY_ALL_CORE
Goals remain appropriate.  Problem: Occupational Therapy Goal  Goal: Occupational Therapy Goal  Description: Goals set 1/5 to be addressed for 7 days with expiration date, 1/12:  Patient will increase functional independence with ADLs by performing:  Patient will demonstrate stand pivot transfers with modified independence.   Not met  Patient will demonstrate grooming while standing with modified independence.   Not met  Patient will demonstrate upper body dressing with modified independence  while seated EOB.   Not met  Patient will demonstrate lower body dressing with modified independence while seated EOB.   Not met  Patient will demonstrate toileting with modified independence.   Not met  Patient will demonstrate bathing while seated EOB with modified independence.   Not met  Patient's family / caregiver will demonstrate independence and safety with assisting patient with self-care skills and functional mobility.     Not met        Outcome: Ongoing, Progressing     
4 = Moderately ill – overt symptoms causing noticeable, but modest, functional impairment or distress; symptom level may warrant medication
3 = Mildly ill – clearly established symptoms with minimal, if any, distress or difficulty in social and occupational function
4 = Moderately ill – overt symptoms causing noticeable, but modest, functional impairment or distress; symptom level may warrant medication
3 = Mildly ill – clearly established symptoms with minimal, if any, distress or difficulty in social and occupational function
4 = Moderately ill – overt symptoms causing noticeable, but modest, functional impairment or distress; symptom level may warrant medication

## 2022-03-09 NOTE — BH INPATIENT PSYCHIATRY PROGRESS NOTE - NSBHMSEBODY_PSY_A_CORE
Average build/Overweight
Overweight
Overweight
Average build/Overweight
Overweight

## 2022-03-09 NOTE — BH PSYCHOLOGY - GROUP THERAPY NOTE - NSBHPSYCHOLGRPTYPE_PSY_A_CORE
DBT Life Skills
General Group Therapy
DBT Life Skills
DBT Life Skills

## 2022-03-09 NOTE — BH PSYCHOLOGY - GROUP THERAPY NOTE - NSPSYCHOLGRPDBTPT_PSY_A_CORE
stable mood and affect in group/patient showing good behavior control/Patient able to identify mood states/other...

## 2022-03-09 NOTE — BH INPATIENT PSYCHIATRY PROGRESS NOTE - NSCGIIMPROVESX_PSY_ALL_CORE
2 = Much improved - notably better with signficant reduction of symptoms; increase in the level of functioning but some symptoms remain
3 = Minimally improved - slightly better with little or no clinically meaningful reduction of symptoms.  Represents very little change in basic clinical status, level of care, or functional capacity.
3 = Minimally improved - slightly better with little or no clinically meaningful reduction of symptoms.  Represents very little change in basic clinical status, level of care, or functional capacity.
4 = No change - symptoms remain essentially unchanged
3 = Minimally improved - slightly better with little or no clinically meaningful reduction of symptoms.  Represents very little change in basic clinical status, level of care, or functional capacity.
2 = Much improved - notably better with signficant reduction of symptoms; increase in the level of functioning but some symptoms remain
3 = Minimally improved - slightly better with little or no clinically meaningful reduction of symptoms.  Represents very little change in basic clinical status, level of care, or functional capacity.

## 2022-03-09 NOTE — BH INPATIENT PSYCHIATRY PROGRESS NOTE - NSTXPROBDCOTHR_PSY_ALL_CORE
DISCHARGE ISSUE - OTHER
admission
DISCHARGE ISSUE - OTHER

## 2022-03-09 NOTE — BH INPATIENT PSYCHIATRY PROGRESS NOTE - NSBHMETABOLIC_PSY_ALL_CORE_FT
BMI:   HbA1c: A1C with Estimated Average Glucose Result: 4.7 % (03-01-22 @ 10:31)    Glucose:   BP: 145/78 (03-03-22 @ 07:52) (123/78 - 145/78)  Lipid Panel: Date/Time: 03-01-22 @ 10:31  Cholesterol, Serum: 164  Direct LDL: --  HDL Cholesterol, Serum: 61  Total Cholesterol/HDL Ration Measurement: --  Triglycerides, Serum: 110  
BMI:   HbA1c: A1C with Estimated Average Glucose Result: 4.7 % (03-01-22 @ 10:31)    Glucose:   BP: 119/69 (03-05-22 @ 08:00) (119/69 - 145/78)  Lipid Panel: Date/Time: 03-01-22 @ 10:31  Cholesterol, Serum: 164  Direct LDL: --  HDL Cholesterol, Serum: 61  Total Cholesterol/HDL Ration Measurement: --  Triglycerides, Serum: 110  
BMI:   HbA1c: A1C with Estimated Average Glucose Result: 4.7 % (03-01-22 @ 10:31)    Glucose:   BP: --  Lipid Panel: Date/Time: 03-01-22 @ 10:31  Cholesterol, Serum: 164  Direct LDL: --  HDL Cholesterol, Serum: 61  Total Cholesterol/HDL Ration Measurement: --  Triglycerides, Serum: 110  
BMI:   HbA1c: A1C with Estimated Average Glucose Result: 4.7 % (03-01-22 @ 10:31)    Glucose:   BP: --  Lipid Panel: Date/Time: 03-01-22 @ 10:31  Cholesterol, Serum: 164  Direct LDL: --  HDL Cholesterol, Serum: 61  Total Cholesterol/HDL Ration Measurement: --  Triglycerides, Serum: 110  
BMI:   HbA1c: A1C with Estimated Average Glucose Result: 4.7 % (03-01-22 @ 10:31)    Glucose:   BP: 145/78 (03-03-22 @ 07:52) (123/78 - 145/78)  Lipid Panel: Date/Time: 03-01-22 @ 10:31  Cholesterol, Serum: 164  Direct LDL: --  HDL Cholesterol, Serum: 61  Total Cholesterol/HDL Ration Measurement: --  Triglycerides, Serum: 110  
BMI:   HbA1c: A1C with Estimated Average Glucose Result: 4.7 % (03-01-22 @ 10:31)    Glucose:   BP: 123/78 (03-02-22 @ 07:57) (123/78 - 126/87)  Lipid Panel: Date/Time: 03-01-22 @ 10:31  Cholesterol, Serum: 164  Direct LDL: --  HDL Cholesterol, Serum: 61  Total Cholesterol/HDL Ration Measurement: --  Triglycerides, Serum: 110  
BMI:   HbA1c: A1C with Estimated Average Glucose Result: 4.7 % (03-01-22 @ 10:31)    Glucose:   BP: 119/69 (03-05-22 @ 08:00) (119/69 - 119/69)  Lipid Panel: Date/Time: 03-01-22 @ 10:31  Cholesterol, Serum: 164  Direct LDL: --  HDL Cholesterol, Serum: 61  Total Cholesterol/HDL Ration Measurement: --  Triglycerides, Serum: 110

## 2022-03-09 NOTE — BH INPATIENT PSYCHIATRY PROGRESS NOTE - NSBHINPTBILLING_PSY_ALL_CORE
[id number] - Inpatient Low Complexity
50727 - Inpatient Low Complexity
[id number] - Inpatient Moderate Complexity
08526 - Inpatient Low Complexity
[id number] - Inpatient Low Complexity
[id number] - Inpatient Moderate Complexity
[id number] - Inpatient Moderate Complexity

## 2022-03-09 NOTE — BH INPATIENT PSYCHIATRY PROGRESS NOTE - NSTXSUICIDGOAL_PSY_ALL_CORE
Will identify and utilize 2 coping skills
Will verbalize a decrease in preoccupation with suicidal thoughts and / or intent to commit suicide to 2 on a 10-point scale
Will identify and utilize 2 coping skills

## 2022-03-09 NOTE — BH INPATIENT PSYCHIATRY PROGRESS NOTE - NSBHMSETHTPROC_PSY_A_CORE
Linear/Normal reasoning

## 2022-03-09 NOTE — BH PSYCHOLOGY - GROUP THERAPY NOTE - NSPSYCHOLGRPDBTPT_PSY_A_CORE FT
DBT skills generalization group is a group in which patients review the skill taught the day before, and patients have the opportunity to troubleshoot the skill, engage in more practice, and share their experience using the skill. Today’s skills review group focused on the skill of Accumulating Positive Emotions in the Long Term by identifying values and translating those into goals and manageable action steps. Patients shared values that are important to them and how these translate into goals, as well as how they’ve begun to implement these.  
DBT skills generalization group is a group in which patients review the skill taught the day before, and patients have the opportunity to troubleshoot the skill, engage in more practice, and share their experience using the skill. Today’s skills review group focused on the skills of “radical acceptance” and "willingness" which include accepting painful situations in their lives they cannot change through turning the mind and practicing engaging in reality effectively. Patients were asked to determine difficult situations in their lives they needed to work on accepting, and provided examples of ways to practice this while in the hospital.  
DBT Group is a group in which patients learn skills to better manage their emotions and behaviors. Group today focused on the “mindfulness” module, which are skills to help patients increase their awareness of their present experience without judgment. Pts were taught the “what” skills (observe, describe, participate) and “how” skills (non-judgmentally, effectively, and one-mindfully) of mindfulness, and engaged in a variety of mindfulness exercises to practice the skills, and shared observations at the end of each activity. 
DBT Group is a group in which patients learn skills to better manage their emotions and behaviors. Group begins with a mindfulness practice so that patients have an opportunity to practice observing their internal and external experiences.  Following the mindfulness exercise the group learns new skills in a didactic format. Group today focused on the “distress tolerance” module, which are skills to help patients manage their crisis urges.  Specifically, pts learned the skill of “radical acceptance,” which includes accepting painful situations in their lives they cannot change through turning the mind and practicing willingness. Patients were asked to determine difficult situations in their lives they needed to work on accepting, and provided examples of ways to practice this while in the hospital. 
DBT Group is a group in which patients learn skills to better manage their emotions and behaviors. Group begins with a mindfulness practice so that patients have an opportunity to practice observing their internal and external experiences.  Following the mindfulness exercise the group learns new skills in a didactic format. Group today focused on the “emotion regulation” module.  Specifically, patients learned Build Mastery and Brethren Ahead and Brethren Ahead. Patients were asked to identify an activity to engage in every day that would help increase their sense of competence and accomplishment (Build Mastery). They were also asked to identify potential difficult situations, identify skills to help manage these difficulties, and imagine coping effectively (Brethren Ahead).

## 2022-03-09 NOTE — BH INPATIENT PSYCHIATRY PROGRESS NOTE - NSBHASSESSSUMMFT_PSY_ALL_CORE
Continue the below plan as per primary treatment team but will discontinue sertraline.     Patient is a 21-year-old female with a history of major depressive disorder, anxiety, and panic attacks who presents from [facility] center for symptoms of a major depressive episode for one week. This occurred after experiencing a G.I. illness. She also expresses passive suicidal ideation, and has been afraid to drive due to concerns that she may attempt to harm herself. Given her recent depression and suicidality, she has a danger to herself and meets criteria for inpatient psychiatric hospitalization.  PsyHx: Sees Dr. Morataya x 3 years. No hosp. No SA.  SH: Lives with parents, attends [employer]    Week of 3/2: Patient is an active participant in individual and group therapy. She has expressed an interest in engaging with her spirituality on the unit. Tolerating cross-titration well.    1. Admission status  9.13 (Voluntary)    2. Significant lab findings  None    3. Psychotropic medications  - Duloxetine 60 mg daily (depression)  	- Start 3/3, inc 3/4    Discontinued Clonazepam 0.5 mg QHS (insomnia)  	- Home medication, d/c due to oversedation  Discontinued Sertraline 50 mg daily (depression)  	- Home medication, dec 3/2, dec 3/3, tapering to d/c      4. Medical conditions, other medications, consults  None    5. Psychosocial interventions  - Family contacted: Contacting father 3/3    
Patient is a 21-year-old female with a history of major depressive disorder, anxiety, and panic attacks who presents from [facility] center for symptoms of a major depressive episode for one week. This occurred after experiencing a G.I. illness. She also expresses passive suicidal ideation, and has been afraid to drive due to concerns that she may attempt to harm herself. Given her recent depression and suicidality, she has a danger to herself and meets criteria for inpatient psychiatric hospitalization.  PsyHx: Sees Dr. Morataya x 3 years. No hosp. No SA.  SH: Lives with parents, attends [employer]    Week of 3/2: Patient is an active participant in individual and group therapy. She has expressed an interest in engaging with her spirituality on the unit. Tolerating cross-titration well.    1. Admission status  9.13 (Voluntary)    2. Significant lab findings  None    3. Psychotropic medications  - Duloxetine 30 mg daily (depression)  	- Start 3/3  - Sertraline 50 mg daily (depression)  	- Home medication, dec 3/2, dec 3/3, tapering to d/c  - Clonazepam 0.5 mg QHS (insomnia)  	- Home medication    4. Medical conditions, other medications, consults  None    5. Psychosocial interventions  - Family contacted: Contacting father 3/3    
Patient is a 21-year-old female with a history of major depressive disorder, anxiety, and panic attacks who presents from [facility] center for symptoms of a major depressive episode for one week. This occurred after experiencing a G.I. illness. She also expresses passive suicidal ideation, and has been afraid to drive due to concerns that she may attempt to harm herself. Given her recent depression and suicidality, she has a danger to herself and meets criteria for inpatient psychiatric hospitalization.  PsyHx: Sees Dr. Morataya x 3 years. No hosp. No SA.  SH: Lives with parents, attends [employer]    Week of 3/2: Patient is an active participant in individual and group therapy. She has expressed an interest in engaging with her spirituality on the unit. Tolerating cross-titration well.    1. Admission status  9.13 (Voluntary)    2. Significant lab findings  None    3. Psychotropic medications  - Duloxetine 60 mg daily (depression)  	- Start 3/3, inc 3/4  - Sertraline 50 mg daily (depression)  	- Home medication, dec 3/2, dec 3/3, tapering to d/c  - Clonazepam 0.5 mg QHS (insomnia)  	- Home medication    4. Medical conditions, other medications, consults  None    5. Psychosocial interventions  - Family contacted: Contacting father 3/3    
Patient is a 21-year-old female with a history of major depressive disorder, anxiety, and panic attacks who presents from [facility] center for symptoms of a major depressive episode for one week. This occurred after experiencing a G.I. illness. She also expresses passive suicidal ideation, and has been afraid to drive due to concerns that she may attempt to harm herself. Given her recent depression and suicidality, she has a danger to herself and meets criteria for inpatient psychiatric hospitalization.  PsyHx: Sees Dr. Morataya x 3 years. No hosp. No SA.  SH: Lives with parents, attends [employer]    Week of 3/2: Patient is an active participant in individual and group therapy. She has expressed an interest in engaging with her spirituality on the unit. Tolerating cross-titration well.  Week of 3/7: Patient reports mood and anxiety are improved. She and father agree with plan for discharge tomorrow.     1. Admission status  9.13 (Voluntary)    2. Significant lab findings  None    3. Psychotropic medications  - Duloxetine 60 mg daily (depression)  	- Start 3/3, inc 3/4    Discontinued Clonazepam 0.5 mg QHS (insomnia)  	- Home medication, d/c due to oversedation  Discontinued Sertraline 50 mg daily (depression)  	- Home medication, dec 3/2, dec 3/3, tapering to d/c      4. Medical conditions, other medications, consults  None    5. Psychosocial interventions  - Family contacted: Contacting father 3/3    
Patient is a 21-year-old female with a history of major depressive disorder, anxiety, and panic attacks who presents from [facility] center for symptoms of a major depressive episode for one week. This occurred after experiencing a G.I. illness. She also expresses passive suicidal ideation, and has been afraid to drive due to concerns that she may attempt to harm herself. Given her recent depression and suicidality, she has a danger to herself and meets criteria for inpatient psychiatric hospitalization.  PsyHx: Sees Dr. Morataya x 3 years. No hosp. No SA.  SH: Lives with parents, attends [employer]    Week of 3/2: Patient is an active participant in individual and group therapy. She has expressed an interest in engaging with her spirituality on the unit. Tolerating cross-titration well.  Week of 3/7: Patient reports mood and anxiety are improved. She and father agree with plan for discharge.     1. Admission status  9.13 (Voluntary)    2. Significant lab findings  None    3. Psychotropic medications  - Duloxetine 60 mg daily (depression)  	- Start 3/3, inc 3/4    Discontinued Clonazepam 0.5 mg QHS (insomnia)  	- Home medication, d/c due to oversedation  Discontinued Sertraline 50 mg daily (depression)  	- Home medication, dec 3/2, dec 3/3, tapering to d/c      4. Medical conditions, other medications, consults  None    5. Psychosocial interventions  - Family contacted: Contacting father 3/3    
Continue the below plan as per primary treatment team but will discontinue sertraline.     Patient is a 21-year-old female with a history of major depressive disorder, anxiety, and panic attacks who presents from [facility] center for symptoms of a major depressive episode for one week. This occurred after experiencing a G.I. illness. She also expresses passive suicidal ideation, and has been afraid to drive due to concerns that she may attempt to harm herself. Given her recent depression and suicidality, she has a danger to herself and meets criteria for inpatient psychiatric hospitalization.  PsyHx: Sees Dr. Morataya x 3 years. No hosp. No SA.  SH: Lives with parents, attends [employer]    Week of 3/2: Patient is an active participant in individual and group therapy. She has expressed an interest in engaging with her spirituality on the unit. Tolerating cross-titration well.    1. Admission status  9.13 (Voluntary)    2. Significant lab findings  None    3. Psychotropic medications  - Duloxetine 60 mg daily (depression)  	- Start 3/3, inc 3/4  - Sertraline 50 mg daily (depression)  	- Home medication, dec 3/2, dec 3/3, tapering to d/c  - Clonazepam 0.5 mg QHS (insomnia)  	- Home medication    4. Medical conditions, other medications, consults  None    5. Psychosocial interventions  - Family contacted: Contacting father 3/3    
Patient is a 21-year-old female with a history of major depressive disorder, anxiety, and panic attacks who presents from [facility] for symptoms of a major depressive episode for one week. This occurred after experiencing a G.I. illness. She also expresses passive suicidal ideation, and has been afraid to drive due to concerns that she may attempt to harm herself. Given her recent depression and suicidality, she has a danger to herself and meets criteria for inpatient psychiatric hospitalization.  PsyHx: Sees Dr. Morataya x 3 years. No hosp. No SA.  SH: Lives with parents, attends [employer]    1. Admission status  9.13 (Voluntary)    2. Significant lab findings  None    3. Psychotropic medications  - Duloxetine 30 mg daily (depression)  	- Start 3/3  - Sertraline 100 mg daily (depression)  	- Home medication, tapering to d/c  - Clonazepam 0.5 mg QHS (insomnia)  	- Home medication    4. Medical conditions, other medications, consults  None    5. Psychosocial interventions  - Family contacted: DIOGO

## 2022-03-09 NOTE — BH PSYCHOLOGY - GROUP THERAPY NOTE - NSPSYCHOLGRPDBTINT_PSY_A_CORE
review emotion regulation homework
reviewed distress tolerance, skills homework
other...

## 2022-03-09 NOTE — BH INPATIENT PSYCHIATRY PROGRESS NOTE - NSTXANXGOAL_PSY_ALL_CORE
Be able to perform ADLs and maintain safety despite anxiety/panic daily

## 2022-03-09 NOTE — BH INPATIENT PSYCHIATRY PROGRESS NOTE - NSTXDCOTHRGOAL_PSY_ALL_CORE
Improved symptom management

## 2022-03-09 NOTE — BH INPATIENT PSYCHIATRY PROGRESS NOTE - PRN MEDS
MEDICATIONS  (PRN):  acetaminophen     Tablet .. 650 milliGRAM(s) Oral every 6 hours PRN Mild Pain (1 - 3), Moderate Pain (4 - 6)  LORazepam     Tablet 1 milliGRAM(s) Oral every 4 hours PRN anxiety/agitation  LORazepam   Injectable 2 milliGRAM(s) IntraMuscular once PRN severe agitation  
MEDICATIONS  (PRN):  acetaminophen     Tablet .. 650 milliGRAM(s) Oral every 6 hours PRN Mild Pain (1 - 3), Moderate Pain (4 - 6)  LORazepam     Tablet 1 milliGRAM(s) Oral every 6 hours PRN anxiety/agitation  LORazepam   Injectable 2 milliGRAM(s) IntraMuscular once PRN severe agitation  
MEDICATIONS  (PRN):  acetaminophen     Tablet .. 650 milliGRAM(s) Oral every 6 hours PRN Mild Pain (1 - 3), Moderate Pain (4 - 6)  hydrOXYzine hydrochloride 25 milliGRAM(s) Oral four times a day PRN Anxiety  LORazepam     Tablet 1 milliGRAM(s) Oral every 6 hours PRN anxiety/agitation  LORazepam   Injectable 2 milliGRAM(s) IntraMuscular once PRN severe agitation  

## 2022-03-09 NOTE — BH INPATIENT PSYCHIATRY PROGRESS NOTE - NSICDXBHPRIMARYDX_PSY_ALL_CORE
Major depressive disorder, recurrent episode, severe with anxious distress   F33.2  

## 2022-03-09 NOTE — BH PSYCHOLOGY - GROUP THERAPY NOTE - NSBHPSYCHOLRESPONSE_PSY_A_CORE
Coping skills acquired/Insight displayed/Accepted support
Coping skills acquired/Accepted support
Coping skills acquired/Insight displayed/Accepted support
Coping skills acquired/Accepted support
Coping skills acquired/Insight displayed/Accepted support
Coping skills acquired/Accepted support

## 2022-03-09 NOTE — BH INPATIENT PSYCHIATRY PROGRESS NOTE - NSBHCHARTREVIEWVS_PSY_A_CORE FT
Vital Signs Last 24 Hrs  T(C): 36.8 (03-07-22 @ 07:46), Max: 36.8 (03-07-22 @ 07:46)  T(F): 98.2 (03-07-22 @ 07:46), Max: 98.2 (03-07-22 @ 07:46)  HR: --  BP: --  BP(mean): --  RR: --  SpO2: --    Orthostatic VS  03-07-22 @ 07:46  Lying BP: --/-- HR: --  Sitting BP: 120/89 HR: 95  Standing BP: --/-- HR: --  Site: --  Mode: --  Orthostatic VS  03-06-22 @ 07:37  Lying BP: --/-- HR: --  Sitting BP: 109/62 HR: 97  Standing BP: --/-- HR: --  Site: --  Mode: --  
Vital Signs Last 24 Hrs  T(C): 36.7 (03-09-22 @ 07:43), Max: 36.7 (03-09-22 @ 07:43)  T(F): 98.1 (03-09-22 @ 07:43), Max: 98.1 (03-09-22 @ 07:43)  HR: --  BP: --  BP(mean): --  RR: --  SpO2: --    Orthostatic VS  03-09-22 @ 07:43  Lying BP: --/-- HR: --  Sitting BP: 118/70 HR: 102  Standing BP: --/-- HR: --  Site: --  Mode: --  Orthostatic VS  03-08-22 @ 07:35  Lying BP: --/-- HR: --  Sitting BP: 119/71 HR: 91  Standing BP: --/-- HR: --  Site: --  Mode: --  
Vital Signs Last 24 Hrs  T(C): 36.9 (03-03-22 @ 07:52), Max: 36.9 (03-03-22 @ 07:52)  T(F): 98.5 (03-03-22 @ 07:52), Max: 98.5 (03-03-22 @ 07:52)  HR: 104 (03-03-22 @ 07:52) (104 - 104)  BP: 145/78 (03-03-22 @ 07:52) (145/78 - 145/78)  BP(mean): --  RR: --  SpO2: --    
Vital Signs Last 24 Hrs  T(C): 36.9 (03-02-22 @ 07:57), Max: 36.9 (03-02-22 @ 07:57)  T(F): 98.4 (03-02-22 @ 07:57), Max: 98.4 (03-02-22 @ 07:57)  HR: 100 (03-02-22 @ 07:57) (100 - 100)  BP: 123/78 (03-02-22 @ 07:57) (123/78 - 123/78)  BP(mean): --  RR: --  SpO2: --    Orthostatic VS  03-01-22 @ 08:12  Lying BP: --/-- HR: --  Sitting BP: 143/81 HR: 100  Standing BP: --/-- HR: --  Site: --  Mode: --  
Vital Signs Last 24 Hrs  T(C): 36.7 (03-04-22 @ 07:55), Max: 36.8 (03-03-22 @ 21:28)  T(F): 98 (03-04-22 @ 07:55), Max: 98.3 (03-03-22 @ 21:28)  HR: --  BP: --  BP(mean): --  RR: --  SpO2: --    Orthostatic VS  03-04-22 @ 07:55  Lying BP: --/-- HR: --  Sitting BP: 126/63 HR: 92  Standing BP: --/-- HR: --  Site: --  Mode: --  
Vital Signs Last 24 Hrs  T(C): 36.1 (03-08-22 @ 07:35), Max: 36.1 (03-08-22 @ 07:35)  T(F): 97 (03-08-22 @ 07:35), Max: 97 (03-08-22 @ 07:35)  HR: --  BP: --  BP(mean): --  RR: --  SpO2: --    Orthostatic VS  03-08-22 @ 07:35  Lying BP: --/-- HR: --  Sitting BP: 119/71 HR: 91  Standing BP: --/-- HR: --  Site: --  Mode: --  Orthostatic VS  03-07-22 @ 07:46  Lying BP: --/-- HR: --  Sitting BP: 120/89 HR: 95  Standing BP: --/-- HR: --  Site: --  Mode: --  
Vital Signs Last 24 Hrs  T(C): 36.7 (03-05-22 @ 08:00), Max: 36.9 (03-04-22 @ 20:31)  T(F): 98.1 (03-05-22 @ 08:00), Max: 98.5 (03-04-22 @ 20:31)  HR: 98 (03-05-22 @ 08:00) (98 - 98)  BP: 119/69 (03-05-22 @ 08:00) (119/69 - 119/69)  BP(mean): --  RR: --  SpO2: --    Orthostatic VS  03-04-22 @ 07:55  Lying BP: --/-- HR: --  Sitting BP: 126/63 HR: 92  Standing BP: --/-- HR: --  Site: --  Mode: --

## 2022-03-09 NOTE — BH PSYCHOLOGY - GROUP THERAPY NOTE - NSPSYCHOLGRPDBTPROB_PSY_A_CORE
depressed mood/suicidal ideation

## 2022-03-09 NOTE — BH PSYCHOLOGY - GROUP THERAPY NOTE - NSPSYCHOLGRPDBTGOAL_PSY_A_CORE
reduce mood and affective lability/reduce suicidal ideation/risk of attempt/improve ability to indentify feelings/improve ability to communicate feelings/reduce vulnerability to emotional dysregualation

## 2022-03-09 NOTE — BH PSYCHOLOGY - GROUP THERAPY NOTE - NSBHPTASSESSDT_PSY_A_CORE
03-Mar-2022 11:00
04-Mar-2022 11:00
08-Mar-2022 11:15
09-Mar-2022 09:15
02-Mar-2022 09:15
01-Mar-2022 17:15

## 2022-03-09 NOTE — BH PSYCHOLOGY - GROUP THERAPY NOTE - NSPSYCHOLGRPBILLING_PSY_A_CORE
[id number] - Group Psychotherapy
[id number] - Group Psychotherapy
[id number] - Group Psychotherapy
[id number] - Group Psychotherapy
[id number] - Group Psychotherapy
[id number] - Group Psychotherapy

## 2022-03-14 ENCOUNTER — TRANSCRIPTION ENCOUNTER (OUTPATIENT)
Age: 22
End: 2022-03-14

## 2022-03-30 ENCOUNTER — INPATIENT (INPATIENT)
Facility: HOSPITAL | Age: 22
LOS: 12 days | Discharge: HOME | End: 2022-04-12
Attending: STUDENT IN AN ORGANIZED HEALTH CARE EDUCATION/TRAINING PROGRAM | Admitting: PSYCHIATRY & NEUROLOGY
Payer: COMMERCIAL

## 2022-03-30 VITALS
DIASTOLIC BLOOD PRESSURE: 92 MMHG | RESPIRATION RATE: 18 BRPM | HEART RATE: 116 BPM | OXYGEN SATURATION: 99 % | SYSTOLIC BLOOD PRESSURE: 141 MMHG

## 2022-03-30 DIAGNOSIS — F41.1 GENERALIZED ANXIETY DISORDER: ICD-10-CM

## 2022-03-30 DIAGNOSIS — F32.9 MAJOR DEPRESSIVE DISORDER, SINGLE EPISODE, UNSPECIFIED: ICD-10-CM

## 2022-03-30 DIAGNOSIS — F33.2 MAJOR DEPRESSIVE DISORDER, RECURRENT SEVERE WITHOUT PSYCHOTIC FEATURES: ICD-10-CM

## 2022-03-30 LAB
ALBUMIN SERPL ELPH-MCNC: 4.6 G/DL — SIGNIFICANT CHANGE UP (ref 3.3–5)
ALP SERPL-CCNC: 64 U/L — SIGNIFICANT CHANGE UP (ref 40–120)
ALT FLD-CCNC: 18 U/L — SIGNIFICANT CHANGE UP (ref 4–33)
AMPHET UR-MCNC: NEGATIVE — SIGNIFICANT CHANGE UP
ANION GAP SERPL CALC-SCNC: 13 MMOL/L — SIGNIFICANT CHANGE UP (ref 7–14)
APAP SERPL-MCNC: <10 UG/ML — LOW (ref 15–25)
APPEARANCE UR: CLEAR — SIGNIFICANT CHANGE UP
AST SERPL-CCNC: 21 U/L — SIGNIFICANT CHANGE UP (ref 4–32)
BARBITURATES UR SCN-MCNC: NEGATIVE — SIGNIFICANT CHANGE UP
BASOPHILS # BLD AUTO: 0.06 K/UL — SIGNIFICANT CHANGE UP (ref 0–0.2)
BASOPHILS NFR BLD AUTO: 0.8 % — SIGNIFICANT CHANGE UP (ref 0–2)
BENZODIAZ UR-MCNC: NEGATIVE — SIGNIFICANT CHANGE UP
BILIRUB SERPL-MCNC: 0.5 MG/DL — SIGNIFICANT CHANGE UP (ref 0.2–1.2)
BILIRUB UR-MCNC: NEGATIVE — SIGNIFICANT CHANGE UP
BUN SERPL-MCNC: 7 MG/DL — SIGNIFICANT CHANGE UP (ref 7–23)
CALCIUM SERPL-MCNC: 9.6 MG/DL — SIGNIFICANT CHANGE UP (ref 8.4–10.5)
CHLORIDE SERPL-SCNC: 103 MMOL/L — SIGNIFICANT CHANGE UP (ref 98–107)
CO2 SERPL-SCNC: 24 MMOL/L — SIGNIFICANT CHANGE UP (ref 22–31)
COCAINE METAB.OTHER UR-MCNC: NEGATIVE — SIGNIFICANT CHANGE UP
COLOR SPEC: SIGNIFICANT CHANGE UP
CREAT SERPL-MCNC: 0.6 MG/DL — SIGNIFICANT CHANGE UP (ref 0.5–1.3)
CREATININE URINE RESULT, DAU: 41 MG/DL — SIGNIFICANT CHANGE UP
DIFF PNL FLD: NEGATIVE — SIGNIFICANT CHANGE UP
EGFR: 130 ML/MIN/1.73M2 — SIGNIFICANT CHANGE UP
EOSINOPHIL # BLD AUTO: 0 K/UL — SIGNIFICANT CHANGE UP (ref 0–0.5)
EOSINOPHIL NFR BLD AUTO: 0 % — SIGNIFICANT CHANGE UP (ref 0–6)
ETHANOL SERPL-MCNC: <10 MG/DL — SIGNIFICANT CHANGE UP
FLUAV AG NPH QL: SIGNIFICANT CHANGE UP
FLUBV AG NPH QL: SIGNIFICANT CHANGE UP
GLUCOSE SERPL-MCNC: 113 MG/DL — HIGH (ref 70–99)
GLUCOSE UR QL: NEGATIVE — SIGNIFICANT CHANGE UP
HCG SERPL-ACNC: <5 MIU/ML — SIGNIFICANT CHANGE UP
HCT VFR BLD CALC: 41.4 % — SIGNIFICANT CHANGE UP (ref 34.5–45)
HGB BLD-MCNC: 14.1 G/DL — SIGNIFICANT CHANGE UP (ref 11.5–15.5)
IANC: 5.69 K/UL — SIGNIFICANT CHANGE UP (ref 1.8–7.4)
IMM GRANULOCYTES NFR BLD AUTO: 0.3 % — SIGNIFICANT CHANGE UP (ref 0–1.5)
KETONES UR-MCNC: NEGATIVE — SIGNIFICANT CHANGE UP
LEUKOCYTE ESTERASE UR-ACNC: NEGATIVE — SIGNIFICANT CHANGE UP
LYMPHOCYTES # BLD AUTO: 1.59 K/UL — SIGNIFICANT CHANGE UP (ref 1–3.3)
LYMPHOCYTES # BLD AUTO: 19.9 % — SIGNIFICANT CHANGE UP (ref 13–44)
MCHC RBC-ENTMCNC: 29.6 PG — SIGNIFICANT CHANGE UP (ref 27–34)
MCHC RBC-ENTMCNC: 34.1 GM/DL — SIGNIFICANT CHANGE UP (ref 32–36)
MCV RBC AUTO: 87 FL — SIGNIFICANT CHANGE UP (ref 80–100)
METHADONE UR-MCNC: NEGATIVE — SIGNIFICANT CHANGE UP
MONOCYTES # BLD AUTO: 0.64 K/UL — SIGNIFICANT CHANGE UP (ref 0–0.9)
MONOCYTES NFR BLD AUTO: 8 % — SIGNIFICANT CHANGE UP (ref 2–14)
NEUTROPHILS # BLD AUTO: 5.69 K/UL — SIGNIFICANT CHANGE UP (ref 1.8–7.4)
NEUTROPHILS NFR BLD AUTO: 71 % — SIGNIFICANT CHANGE UP (ref 43–77)
NITRITE UR-MCNC: NEGATIVE — SIGNIFICANT CHANGE UP
NRBC # BLD: 0 /100 WBCS — SIGNIFICANT CHANGE UP
NRBC # FLD: 0 K/UL — SIGNIFICANT CHANGE UP
OPIATES UR-MCNC: NEGATIVE — SIGNIFICANT CHANGE UP
OXYCODONE UR-MCNC: NEGATIVE — SIGNIFICANT CHANGE UP
PCP SPEC-MCNC: SIGNIFICANT CHANGE UP
PCP UR-MCNC: NEGATIVE — SIGNIFICANT CHANGE UP
PH UR: 7 — SIGNIFICANT CHANGE UP (ref 5–8)
PLATELET # BLD AUTO: 225 K/UL — SIGNIFICANT CHANGE UP (ref 150–400)
POTASSIUM SERPL-MCNC: 4.2 MMOL/L — SIGNIFICANT CHANGE UP (ref 3.5–5.3)
POTASSIUM SERPL-SCNC: 4.2 MMOL/L — SIGNIFICANT CHANGE UP (ref 3.5–5.3)
PROT SERPL-MCNC: 8 G/DL — SIGNIFICANT CHANGE UP (ref 6–8.3)
PROT UR-MCNC: NEGATIVE — SIGNIFICANT CHANGE UP
RBC # BLD: 4.76 M/UL — SIGNIFICANT CHANGE UP (ref 3.8–5.2)
RBC # FLD: 13 % — SIGNIFICANT CHANGE UP (ref 10.3–14.5)
RSV RNA NPH QL NAA+NON-PROBE: SIGNIFICANT CHANGE UP
SALICYLATES SERPL-MCNC: <0.3 MG/DL — LOW (ref 15–30)
SARS-COV-2 RNA SPEC QL NAA+PROBE: SIGNIFICANT CHANGE UP
SODIUM SERPL-SCNC: 140 MMOL/L — SIGNIFICANT CHANGE UP (ref 135–145)
SP GR SPEC: 1.01 — SIGNIFICANT CHANGE UP (ref 1–1.05)
THC UR QL: NEGATIVE — SIGNIFICANT CHANGE UP
TOXICOLOGY SCREEN, DRUGS OF ABUSE, SERUM RESULT: SIGNIFICANT CHANGE UP
TSH SERPL-MCNC: 1.09 UIU/ML — SIGNIFICANT CHANGE UP (ref 0.27–4.2)
UROBILINOGEN FLD QL: SIGNIFICANT CHANGE UP
WBC # BLD: 8 K/UL — SIGNIFICANT CHANGE UP (ref 3.8–10.5)
WBC # FLD AUTO: 8 K/UL — SIGNIFICANT CHANGE UP (ref 3.8–10.5)

## 2022-03-30 PROCEDURE — 93010 ELECTROCARDIOGRAM REPORT: CPT

## 2022-03-30 PROCEDURE — 99285 EMERGENCY DEPT VISIT HI MDM: CPT | Mod: 25

## 2022-03-30 PROCEDURE — 99284 EMERGENCY DEPT VISIT MOD MDM: CPT | Mod: GC

## 2022-03-30 RX ORDER — LORATADINE 10 MG/1
10 TABLET ORAL DAILY
Refills: 0 | Status: DISCONTINUED | OUTPATIENT
Start: 2022-03-30 | End: 2022-03-30

## 2022-03-30 RX ORDER — LANOLIN ALCOHOL/MO/W.PET/CERES
5 CREAM (GRAM) TOPICAL AT BEDTIME
Refills: 0 | Status: DISCONTINUED | OUTPATIENT
Start: 2022-03-30 | End: 2022-04-07

## 2022-03-30 RX ORDER — DULOXETINE HYDROCHLORIDE 30 MG/1
60 CAPSULE, DELAYED RELEASE ORAL DAILY
Refills: 0 | Status: DISCONTINUED | OUTPATIENT
Start: 2022-03-30 | End: 2022-03-31

## 2022-03-30 RX ORDER — LORATADINE 10 MG/1
10 TABLET ORAL AT BEDTIME
Refills: 0 | Status: DISCONTINUED | OUTPATIENT
Start: 2022-03-30 | End: 2022-04-12

## 2022-03-30 RX ORDER — ATORVASTATIN CALCIUM 80 MG/1
10 TABLET, FILM COATED ORAL AT BEDTIME
Refills: 0 | Status: DISCONTINUED | OUTPATIENT
Start: 2022-03-30 | End: 2022-04-12

## 2022-03-30 RX ORDER — HYDROXYZINE HCL 10 MG
25 TABLET ORAL EVERY 6 HOURS
Refills: 0 | Status: DISCONTINUED | OUTPATIENT
Start: 2022-03-30 | End: 2022-03-31

## 2022-03-30 RX ORDER — TRAZODONE HCL 50 MG
25 TABLET ORAL AT BEDTIME
Refills: 0 | Status: DISCONTINUED | OUTPATIENT
Start: 2022-03-30 | End: 2022-04-12

## 2022-03-30 RX ADMIN — Medication 25 MILLIGRAM(S): at 21:59

## 2022-03-30 RX ADMIN — LORATADINE 10 MILLIGRAM(S): 10 TABLET ORAL at 22:00

## 2022-03-30 RX ADMIN — Medication 5 MILLIGRAM(S): at 21:59

## 2022-03-30 RX ADMIN — ATORVASTATIN CALCIUM 10 MILLIGRAM(S): 80 TABLET, FILM COATED ORAL at 21:59

## 2022-03-30 NOTE — ED PROVIDER NOTE - CARE PLAN
1 Principal Discharge DX:	MDD (major depressive disorder)  Secondary Diagnosis:	Generalized anxiety disorder

## 2022-03-30 NOTE — ED BEHAVIORAL HEALTH ASSESSMENT NOTE - RISK ASSESSMENT
Chronic: prior psych hospitalization, hx of SI, depression/anxiety  Acute: current depressive symptoms, anxiety, SI, recent inpatient discharge  Protective: supportive family/network, engaged in treatment, help-seeking, future-oriented, established care in outpatient Moderate Acute Suicide Risk

## 2022-03-30 NOTE — ED BEHAVIORAL HEALTH ASSESSMENT NOTE - PSYCHIATRIC ISSUES AND PLAN (INCLUDE STANDING AND PRN MEDICATION)
continue Cymbalta 60 mg qDaily, trazodone 25 mg qHS, melatonin 5 mg qHS, Atarax PRN for anxiety, added Ativan PRN for severe anxiety/agitation PO/IM

## 2022-03-30 NOTE — ED BEHAVIORAL HEALTH ASSESSMENT NOTE - CURRENT MEDICATION
Atorvastatin 10 mg qDaily, Claritin 10 mg qDaily, Trazodone 25 mg qHS, melatonin (extended-release) 5 mg qHS, Atarax 25 mg PRN, Cymbalta 60 mg qDaily, birth control

## 2022-03-30 NOTE — ED PROVIDER NOTE - INTERPRETATION
Detail Level: Zone Otc Regimen: Apply desitin to affected area daily after ketoconazole cream abnormal

## 2022-03-30 NOTE — ED BEHAVIORAL HEALTH ASSESSMENT NOTE - HPI (INCLUDE ILLNESS QUALITY, SEVERITY, DURATION, TIMING, CONTEXT, MODIFYING FACTORS, ASSOCIATED SIGNS AND SYMPTOMS)
21-year-old female, single, non-caregiver residing with her parents, currently attending [employer], with PPhx anxiety, panic and depression with long hx of outpatient treatment (currently in treatment with Dr. Morataya for the past 3 years), no history of violence, no legal involvement, no self-injurious behaviors, no suicide attempts, 1 prior psychiatric hospitalization recently at [facility] from 2/28-3/9/22), and no access to guns/weapons, who presents to [facility] ED BIB father on recommendation from therapist due to progressive depressive and anxious symptoms with SI.    On interview, patient reports depressive symptoms (hopelessness, rumination, sadness, concentration/attention deficit, feelings of remorse/guilt, dec sleep/appetite) and anxiety (worry about various things, palpitations, nausea at times) that started around 2 days following discharge but progressed. She states symptoms occur sporadically but generally don't last through the day (until more recently). Patient endorses associated suicidality, passive, but with method around OD/taking more medication than prescribed. Denies intent. Patient additionally endorses urges toward self-harm (cutting/scratching at skin). She denies prior history related to this but describes during periods of anxiety she digs her nails in skin though without injury. She endorses recent stressors related to a recent cold and missing a trip to her campus (states the drive is 1.5 hrs long and can be triggering as commuted has been a major source of stress regarding college, she turned her car around after starting the trip and has been ruminating on this/feeling regret).     Patient denies psychotic and manic symptoms. She further denies substance use presently and historically. She endorses diagnoses of seasonal allergies (takes Claritin), hyperlipidemia (takes Atorvastatin).     Today, patient came to ED following recommendation of therapist after mentioning that she is experiencing SI. She is seeking voluntary admission for stability of acute, progressive depressive/anxious symptoms with SI. She feels her medications may benefit from adjustment as she feels currently they haven't continued to help post-discharge.    On collateral with father:  corroborates the above, states patient had on/off anxiety since discharge that had worsened. Reports patient was forthcoming about how she was feeling including her thoughts around SH/SI. Feels patient would benefit from hospitalization for stability and optimization of medication regimen.    On collateral with Dr. Morataya:  Reports patient has been struggling with continued depression/anxiety but did not disclose SI during Monday appointment (two days prior to current presentation). Writer informed her about stressor related to missed college trip. Dr. Morataya further elaborated that college/education has been a major stressor due to current halt in coursework. She states patient's family/therapist have been in contact regarding presentation and all are in agreement with hospitalization for stability and optimization of medication mgmt. She feels in addition to FRANSISCO/MDD patient may have experienced an ego injury related to coursework as a precipitant.

## 2022-03-30 NOTE — ED BEHAVIORAL HEALTH ASSESSMENT NOTE - DIFFERENTIAL
MDD, FRANSISCO vs MDD w/anxious distress +/- panic symptoms  Consider exploring personality structure.

## 2022-03-30 NOTE — ED BEHAVIORAL HEALTH ASSESSMENT NOTE - OTHER PAST PSYCHIATRIC HISTORY (INCLUDE DETAILS REGARDING ONSET, COURSE OF ILLNESS, INPATIENT/OUTPATIENT TREATMENT)
1 prior hospitalization at  at [facility] 2/28-3/9, outpatient care under Dr. Morataya for past 3 years, therapist Aida Martines

## 2022-03-30 NOTE — ED BEHAVIORAL HEALTH ASSESSMENT NOTE - NSSUICPROTFACT_PSY_ALL_CORE
Supportive social network of family or friends/Positive therapeutic relationships/Ability to cope with stress/Frustration tolerance

## 2022-03-30 NOTE — ED ADULT TRIAGE NOTE - CHIEF COMPLAINT QUOTE
Pt brought in by father for psych eval. Pt sent by her therapist for suicidal thoughts with plan. Pt calm and cooperative in triage.

## 2022-03-30 NOTE — ED PROVIDER NOTE - OBJECTIVE STATEMENT
This is a 20 yr old F, pmh hld, anxiety, mdd with c/o increased anxiety and wake up with si with the urge to overdose on medication. Reports  unable to identify any current stressors. Feels impulse and afraid might do something to herself. Student at [employer]. Pt express would like to sign in and get stabilized.   Compliant with medications and out patient psychiatric treatment.   father Omega Medina ( [phone])    therapist Aida Martines ( [phone])   psychiatrist Allie Menendez ( [phone])

## 2022-03-30 NOTE — ED BEHAVIORAL HEALTH ASSESSMENT NOTE - SUMMARY
21-year-old female, single, non-caregiver residing with her parents, currently attending [employer], with PPhx anxiety, panic and depression with long hx of outpatient treatment (currently in treatment with Dr. Morataya for the past 3 years), no history of violence, no legal involvement, no self-injurious behaviors, no suicide attempts, 1 prior psychiatric hospitalization recently at [facility] 1S from 2/28-3/9/22), and no access to guns/weapons, who presents to [facility] ED BIB father on recommendation from therapist due to progressive depressive and anxious symptoms with SI.    On assessment, patient meeting criteria for major depressive disorder. There exists a co-morbid generalized anxiety disorder per patient's report and discussion with her outpatient psychiatrist. Given progressive symptoms with suicidality (including method, though no intent) refractory to current medication regimen, patient meets criteria for psychiatric hospitalization for acute stability of symptoms. She is amenable to voluntary hospitalization.

## 2022-03-30 NOTE — ED BEHAVIORAL HEALTH ASSESSMENT NOTE - DETAILS
Left message on VM regarding [facility], admitted for depression/anxiety/SI called father, psychiatrist, therapist phone call hand-off with diagnostic information/treatment considerations Patient with passive SI but with described method (OD), denies intent

## 2022-03-30 NOTE — ED BEHAVIORAL HEALTH ASSESSMENT NOTE - DESCRIPTION
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

## 2022-03-30 NOTE — ED ADULT NURSE NOTE - OBJECTIVE STATEMENT
Pt BIB father for suicidal ideation with plan but no intent.  Pt states that she was sent by her therapist for suicidal thoughts to maybe take more of her medication.  Pt states that the last time she had thoughts like this 3 weeks ago she was admitted to [facility].  Pt states that she does not think she can control her impulses if she went home.  Pt denies hx of SA, NSSIB, ETOH, substance use, HI, AH/VH.  Pt is calm and cooperative.

## 2022-03-30 NOTE — ED BEHAVIORAL HEALTH NOTE - BEHAVIORAL HEALTH NOTE
COVID Exposure Screen- Patient  1.        *Have you had a COVID-19 test in the last 90 days?  (  ) Yes   ( x ) No   (  ) Unknown- Reason: _____  IF YES PROCEED TO QUESTION #2. IF NO OR UNKNOWN, PLEASE SKIP TO QUESTION #3.  2.          Date of test(s) and result(s): ________  3.        *Have you tested positive for COVID-19 antibodies? (  ) Yes   (  ) No   ( x ) Unknown- Reason: _____  IF YES PROCEED TO QUESTION #4. IF NO or UNKNOWN, PLEASE SKIP TO QUESTION #5.  4.        Date of positive antibody test: ________  5.        *Have you received 2 doses of the COVID-19 vaccine? ( x ) Yes   (  ) No   (  ) Unknown- Reason: _____  IF YES PROCEED TO QUESTION #6. IF NO or UNKNOWN, PLEASE SKIP TO QUESTION #7.  6.        Date of second dose: last year  7.        *In the past 10 days, have you been around anyone with a positive COVID-19 test?* (  ) Yes   (x  ) No   (  ) Unknown- Reason: ____  IF YES PROCEED TO QUESTION #8. IF NO or UNKNOWN, PLEASE SKIP TO QUESTION #13.  8.        Were you within 6 feet of them for at least 15 minutes? (  ) Yes   (  ) No   (  ) Unknown- Reason: _____  9.        Have you provided care for them? (  ) Yes   (  ) No   (  ) Unknown- Reason: ______  10.     Have you had direct physical contact with them (touched, hugged, or kissed them)? (  ) Yes   (  ) No    (  ) Unknown- Reason: _____  11.     Have you shared eating or drinking utensils with them? (  ) Yes   (  ) No    (  ) Unknown- Reason: ____  12.     Have they sneezed, coughed, or somehow gotten respiratory droplets on you? (  ) Yes   (  ) No    (  ) Unknown- Reason: ______  13.     *Have you been out of [place] within the past 10 days?* (  ) Yes   (x  ) No   (  ) Unknown- Reason: _____  IF YES PLEASE ANSWER THE FOLLOWING QUESTIONS:  14.     Which state/country have you been to? ______  15.     Were you there over 24 hours? (  ) Yes   (  ) No    (  ) Unknown- Reason: ______  16.     Date of return to [facility]: ______

## 2022-03-30 NOTE — ED BEHAVIORAL HEALTH ASSESSMENT NOTE - NS ED BHA DEMOGRAPHICS MEDICAL RECORD REVIEWED YES RECORDS
"  PEDIATRICS DAILY PROGRESS NOTE    ADMISSION DATE:  2/12/2020  DATE:  2/27/2020  CURRENT HOSPITAL DAY:  Hospital Day: 12   ATTENDING PHYSICIAN:  Case Basilio MD  CODE STATUS:  Full Resuscitation    CHIEF COMPLAINT:  Bloody diarrhea secondary to UC    ACTIVE PROBLEMS:    Active Hospital Problems    Diagnosis   â¢ Blood in stool     INTERVAL HISTORY:    Donis Cummins is a 11year old male patient admitted with Colitis [K52.9]     Overnight events: VSS. He states he has good energy. He has been up and ambulating. He has had 3 loose stools, with the same consistency as yesterday. His stool output was 900 ml, which is stable from his 900 yesterday. He states the mesalamine enema helped him feel better. He denies any abdominal pain and did not take any prn pain medications. He was able to tolerate a bland GPD with 2 ensure shakes without any issues. He has 2100 calories from just PO intake yesterday. UOP 1.5 ml/kg/day. No emesis. PUCAI score = moderate 15-30 mild- moderate  +pain can be ignored   +rectal bleeding small amount in less than 50 % of stools/none   +unformed/partially formed stools   3 stools/day   -nocturnal stools ( 0 nocturnal)   +no restriction of activities     REVIEW OF SYSTEMS:  All other ROS negative unless noted above.     OBJECTIVE:    VITAL SIGNS:     Vital Last Value 24 Hour Range   Temperature 98.1 Â°F (36.7 Â°C) (02/27/20 0800) Temp  Min: 96.8 Â°F (36 Â°C)  Max: 99.1 Â°F (37.3 Â°C)   Pulse 90 (02/27/20 0800) Pulse  Min: 89  Max: 110   Respiratory 16 (02/27/20 0800) Resp  Min: 16  Max: 24   Non-Invasive  Blood Pressure (!) 84/56 (02/27/20 0800) BP  Min: 84/56  Max: 118/71   Pulse Oximetry 98 % (02/27/20 0300) SpO2  Min: 98 %  Max: 100 %     Vital Today Admitted   Weight 44.1 kg (97 lb 3.6 oz) (02/23/20 1145) Weight: 49.3 kg (108 lb 11 oz) (02/12/20 0157)   Height N/A Height: 5' 6.14"" (168 cm) (02/12/20 1800)   Body Mass Index N/A BMI (Calculated): 16.23 (02/16/20 1649)     INTAKE/OUTPUT:  " Intake/Output Summary (Last 24 hours) at 2/27/2020 0843  Last data filed at 2/27/2020 0800  Gross per 24 hour   Intake 2179.9 ml   Output 2900 ml   Net -720.1 ml         PHYSICAL EXAM:    Physical Exam   Constitutional: He is oriented to person, place, and time. He appears well-developed and well-nourished. No distress. Non toxic  HENT:   Head: Normocephalic and atraumatic. Mouth/Throat: Oropharynx is clear and moist.   Eyes: Pupils are equal, round, and reactive to light. EOM are normal. Right eye exhibits no discharge. Left eye exhibits no discharge. Neck: Normal range of motion. Neck supple. Cardiovascular: Normal rate, regular rhythm, normal heart sounds and intact distal pulses. No murmur heard. Pulmonary/Chest: Effort normal and breath sounds normal. No respiratory distress. He has no wheezes. He exhibits no tenderness. Abdominal: Soft. He exhibits no mass. There is no tenderness to palpation. There is no rebound and no guarding. Normal bowel sounds   Musculoskeletal: Normal range of motion. General: No tenderness or edema. Neurological: He is alert and oriented to person, place, and time. Coordination normal.   Skin: Skin is warm and dry. No rash noted. No erythema. PICC in place- c/d/i  Psychiatric: He has a normal mood and affect. His behavior is normal. Judgment and thought content normal.   Nursing note and vitals reviewed.       LABORATORY DATA:    Recent Results (from the past 24 hour(s))   CBC with Automated Differential    Collection Time: 02/27/20  6:23 AM   Result Value Ref Range    WBC 14.3 (H) 4.2 - 11.0 K/mcL    RBC 3.33 (L) 3.90 - 5.30 mil/mcL    HGB 10.0 (L) 13.0 - 17.0 g/dL    HCT 30.5 (L) 39.0 - 51.0 %    MCV 91.6 78.0 - 100.0 fl    MCH 30.0 26.0 - 34.0 pg    MCHC 32.8 32.0 - 36.5 g/dL    RDW-CV 12.3 11.0 - 15.0 %     140 - 450 K/mcL    NRBC 0 0 /100 WBC    DIFF TYPE AUTOMATED DIFFERENTIAL     Neutrophil 75 %    LYMPH 14 %    MONO 7 %    EOSIN 0 %    BASO 1 % Percent Immature Granuloctyes 3 %    Absolute Neutrophil 10.8 (H) 1.8 - 8.0 K/mcL    Absolute Lymph 2.0 1.2 - 5.2 K/mcL    Absolute Mono 1.0 (H) 0.3 - 0.9 K/mcL    Absolute Eos 0.0 (L) 0.1 - 0.5 K/mcL    Absolute Baso 0.1 0.0 - 0.3 K/mcL    Absolute Immature Granulocytes 0.5 (H) 0 - 0.2 K/mcl   C Reactive Protein    Collection Time: 02/27/20  6:23 AM   Result Value Ref Range    C-REACTIVE PROTEIN 3.3 (H) <1.0 mg/dL   Sedimentation Rate Westergren    Collection Time: 02/27/20  6:23 AM   Result Value Ref Range    ESR 50 (H) 0 - 20 mm/hr   Comprehensive Metabolic Panel    Collection Time: 02/27/20  6:23 AM   Result Value Ref Range    Sodium 135 135 - 145 mmol/L    Potassium 4.3 3.4 - 5.1 mmol/L    Chloride 101 98 - 107 mmol/L    Carbon Dioxide 33 (H) 21 - 32 mmol/L    Anion Gap 5 (L) 10 - 20 mmol/L    Glucose 122 (H) 65 - 99 mg/dL    BUN 16 6 - 20 mg/dL    Creatinine 0.42 0.38 - 1.15 mg/dL    GFR Estimate,  Not calculated. GFR Estimate, Non  Not calculated. BUN/Creatinine Ratio 38 (H) 7 - 25    CALCIUM 8.8 8.0 - 11.0 mg/dL    TOTAL BILIRUBIN 0.3 0.2 - 1.0 mg/dL    AST/SGOT 52 (H) 10 - 45 Units/L    ALT/SGPT 191 (H) 10 - 50 Units/L    ALK PHOSPHATASE 172 55 - 220 Units/L    TOTAL PROTEIN 6.0 6.0 - 8.3 g/dL    Albumin 2.5 (L) 3.6 - 5.1 g/dL    GLOBULIN 3.5 2.0 - 4.0 g/dL    A/G Ratio, Serum 0.7 (L) 1.0 - 2.4   Magnesium    Collection Time: 02/27/20  6:23 AM   Result Value Ref Range    MAGNESIUM 2.5 (H) 1.7 - 2.4 mg/dL   Phosphorus    Collection Time: 02/27/20  6:23 AM   Result Value Ref Range    PHOSPHORUS 4.4 2.7 - 4.7 mg/dL      IMAGING STUDIES:    Imaging studies reviewed. ASSESSMENT:     Verna Ybarra is a 10 yo M with newly diagnosed ulcerative colitis. He received his first induction dose of IFX 10mg/kg on 2/22/20. He reports feeling slightly better over the past few days. Stools are becoming slightly more formed and less bloody.  His energy level is improved. He is trying to ambulate in and around his room. Hemoglobin is stable without headaches or dizziness. Inflammatory markers are overall downtrending. Hep B core antibody was positive and calprotectin was elevated at 2740. He is feeling better, stool output is stable and he complains of no pain. Will continue a bland diet with low residue and no dairy and decrease the TPN. Next steps of management are based on clinical improvement, expanding his diet as tolerated and reducing TPN/IL. He is currently clinically stable on room air. PLAN:    FEN/GI  - biopsy results from colonoscopy confirm UC  - GPD, bland, no dairy (except 2 ensures), low residue  -Vitamin D 4000 units daily  - SLIV  - IV solumedrol 20mg BID, likely transition to PO on 2/28  -Protonix 20 mg BID  - Monitor I+Os - monitor stool volume and frequency  - nutrition consult, appreciate recs  -TPN with lipids, decreasing amount given. 25 % of calories to be given by TPN.   - IFX 10mg/kg on 2/22, next dose 2/29  - Calprotectin pending  - Rowasa enema 2/27 pm  - Will monitor VS and fluid status and consider bolus PRN      ID:  -Monitor for fever  -Given presence of central line, if febrile or clinical status deteriorates send blood culture and start empiric antibiotics  Â   Pain/neuro:  - Tylenol 650mg q6H PRN for pain  - No NSAIDS  - PT consulted, appreciate recs  - Psych consulted, appreciate recs  Â   Cardiovascular: HDS  - Monitor for tachycardia and hypotension  Â   Respiratory: Stable  - RA  - Pulse oximetry spot checks Q4H  Â   Lines: PICC   Function, necessity, and utility of all lines was discussed on rounds. Â   VTE: 1, (for IBD), encourage ambulation. Â   Dispo: Pt to remain hospitalized until stool output has decreased and pain is controlled with PO pain meds, and his diet is advanced. Plan was discussed with supervising attending, senior resident, and team. Above assessment and plan was discussed with family who agreed.  All questions addressed.      Lam Penny MD  PGY1, Pediatrics Hospital chart

## 2022-03-30 NOTE — ED BEHAVIORAL HEALTH ASSESSMENT NOTE - MEDICAL ISSUES AND PLAN (INCLUDE STANDING AND PRN MEDICATION)
Continue home atorvastatin 10 mg qDaily, Claritin 10 mg qDaily, birth control (will need non-forumulary possibly)

## 2022-03-31 DIAGNOSIS — G47.00 INSOMNIA, UNSPECIFIED: ICD-10-CM

## 2022-03-31 DIAGNOSIS — E78.5 HYPERLIPIDEMIA, UNSPECIFIED: ICD-10-CM

## 2022-03-31 LAB
COVID-19 SPIKE DOMAIN AB INTERP: POSITIVE
COVID-19 SPIKE DOMAIN ANTIBODY RESULT: >250 U/ML — HIGH
SARS-COV-2 IGG+IGM SERPL QL IA: >250 U/ML — HIGH
SARS-COV-2 IGG+IGM SERPL QL IA: POSITIVE

## 2022-03-31 PROCEDURE — 90853 GROUP PSYCHOTHERAPY: CPT

## 2022-03-31 PROCEDURE — 90832 PSYTX W PT 30 MINUTES: CPT | Mod: 59

## 2022-03-31 PROCEDURE — 99222 1ST HOSP IP/OBS MODERATE 55: CPT | Mod: 25

## 2022-03-31 RX ORDER — DULOXETINE HYDROCHLORIDE 30 MG/1
80 CAPSULE, DELAYED RELEASE ORAL DAILY
Refills: 0 | Status: DISCONTINUED | OUTPATIENT
Start: 2022-04-01 | End: 2022-04-07

## 2022-03-31 RX ORDER — CLONAZEPAM 1 MG
0.5 TABLET ORAL AT BEDTIME
Refills: 0 | Status: DISCONTINUED | OUTPATIENT
Start: 2022-03-31 | End: 2022-04-05

## 2022-03-31 RX ORDER — HYDROXYZINE HCL 10 MG
50 TABLET ORAL EVERY 6 HOURS
Refills: 0 | Status: DISCONTINUED | OUTPATIENT
Start: 2022-03-31 | End: 2022-04-12

## 2022-03-31 RX ADMIN — Medication 25 MILLIGRAM(S): at 22:26

## 2022-03-31 RX ADMIN — ATORVASTATIN CALCIUM 10 MILLIGRAM(S): 80 TABLET, FILM COATED ORAL at 22:16

## 2022-03-31 RX ADMIN — Medication 2 MILLIGRAM(S): at 18:55

## 2022-03-31 RX ADMIN — LORATADINE 10 MILLIGRAM(S): 10 TABLET ORAL at 22:16

## 2022-03-31 RX ADMIN — DULOXETINE HYDROCHLORIDE 60 MILLIGRAM(S): 30 CAPSULE, DELAYED RELEASE ORAL at 08:29

## 2022-03-31 RX ADMIN — Medication 0.5 MILLIGRAM(S): at 22:16

## 2022-03-31 RX ADMIN — Medication 5 MILLIGRAM(S): at 22:15

## 2022-03-31 RX ADMIN — Medication 1 MILLIGRAM(S): at 08:29

## 2022-03-31 RX ADMIN — Medication 25 MILLIGRAM(S): at 06:38

## 2022-03-31 NOTE — BH INPATIENT PSYCHIATRY ASSESSMENT NOTE - NSBHASSESSSUMMFT_PSY_ALL_CORE
21-year-old female, single, non-caregiver residing with her parents, currently attending [employer], with PPhx anxiety, panic and depression with long hx of outpatient treatment (currently in treatment with Dr. Morataya for the past 3 years), no history of violence, no legal involvement, no self-injurious behaviors, no suicide attempts, 1 prior psychiatric hospitalization recently at [facility] from 2/28-3/9/22), and no access to guns/weapons, who presents to [employer] ED BIB father on recommendation from therapist due to progressive depressive and anxious symptoms with SI.  -No 1:1 needed as patient is voluntary, cooperative, calm, no active SI in the hospital, happy to be getting help.  -Increase Cymbalta to 80mg daily  -Continue melatonin and Trazodone for insomnia.  Add Klonopin 0.5mg hs for insomnia and anxiety.  Higher doses of trazodone causes excessive sedation in the past.  -Continue Lipitor and claritin  -Group and individual therapy  -Consider PHP

## 2022-03-31 NOTE — BH INPATIENT PSYCHIATRY ASSESSMENT NOTE - MSE UNSTRUCTURED FT
The patient appears stated age, with good hygiene, and is dressed appropriately.  She was calm and cooperative with the interview.  She maintained appropriate eye contact.  No restlessness or slowing of movements observed.  Gait is steady.  The patient’s speech was fluent, normal in tone, rate, and volume.  The patient’s mood is “anxious.”  Her affect is constricted, tearful at times, stable, appropriate.  The patient’s thoughts are goal directed.  She does not have any delusions or hallucinations.  She has passive suicidal ideation without active SI, intent or plan in the hospital.  No homicidal ideation, intent, or plan.  Insight is good.  Judgment is good.  Impulse control has been good on the unit.

## 2022-03-31 NOTE — BH SOCIAL WORK INITIAL PSYCHOSOCIAL EVALUATION - OTHER PAST PSYCHIATRIC HISTORY (INCLUDE DETAILS REGARDING ONSET, COURSE OF ILLNESS, INPATIENT/OUTPATIENT TREATMENT)
1 prior hospitalization at  at [facility] 2/28-3/9, outpatient care under Dr. Morataya for past 3 years, therapist Aida Martines.

## 2022-03-31 NOTE — BH INPATIENT PSYCHIATRY ASSESSMENT NOTE - RISK ASSESSMENT
Chronic: prior psych hospitalization, hx of SI, depression/anxiety  Acute: current depressive symptoms, anxiety, SI, recent inpatient discharge  Protective: supportive family/network, engaged in treatment, help-seeking, future-oriented, established care in outpatient

## 2022-03-31 NOTE — PSYCHIATRIC REHAB INITIAL EVALUATION - NSBHPRRECOMMEND_PSY_ALL_CORE
Writer met with patient in order to orient patient to unit, and introduce patient to psychiatric staff and department functions. Patient was verbal, polite, and forthcoming according to ability with personal information on interview. Pt appears depressed and anxious.    Pt has been admitted upon recommendation of her therapist to be admitted for SI via OD but with no plan. Pt has panic attack and anxiety as he was trying to re socialize back with her college peers. On her way to the train via driving she decompensated with panic attack. Pt felt guilt and shame. Pt endorsed poor sleep/appetite. Pt is seeing a therapist/psychiatrist. Pt endorsed passive SI with no plan/intent currently.    Writer collaborated with patient to select an appropriate psychiatric rehabilitation goal. Pt was receptive. Psychiatric rehabilitation staff will continue to engage patient daily in order to develop therapeutic rapport. In response to COVID19, unit programming will be re-evaluated on a consistent basis in an effort to maintain safety guidelines. Pt was given a programming schedule. Writer encouraged pt to attend group therapy in the unit.

## 2022-03-31 NOTE — BH INPATIENT PSYCHIATRY ASSESSMENT NOTE - NSBHCHARTREVIEWVS_PSY_A_CORE FT
Vital Signs Last 24 Hrs  T(C): 37 (03-31-22 @ 07:57), Max: 37 (03-30-22 @ 16:47)  T(F): 98.6 (03-31-22 @ 07:57), Max: 98.6 (03-30-22 @ 16:47)  HR: 117 (03-30-22 @ 16:47) (116 - 117)  BP: 131/71 (03-31-22 @ 07:57) (121/78 - 141/92)  BP(mean): 113 (03-31-22 @ 07:57) (113 - 113)  RR: 17 (03-30-22 @ 19:17) (17 - 18)  SpO2: 100% (03-30-22 @ 16:47) (99% - 100%)    Orthostatic VS  03-30-22 @ 19:17  Lying BP: --/-- HR: --  Sitting BP: 120/77 HR: 91  Standing BP: 119/80 HR: 111  Site: --  Mode: --

## 2022-03-31 NOTE — BH INPATIENT PSYCHIATRY ASSESSMENT NOTE - NSBHMETABOLIC_PSY_ALL_CORE_FT
BMI: BMI (kg/m2): 27.8 (03-30-22 @ 19:17)  HbA1c: A1C with Estimated Average Glucose Result: 4.7 % (03-01-22 @ 10:31)    Glucose:   BP: 131/71 (03-31-22 @ 07:57) (121/78 - 141/92)  Lipid Panel: Date/Time: 03-01-22 @ 10:31  Cholesterol, Serum: 164  Direct LDL: --  HDL Cholesterol, Serum: 61  Total Cholesterol/HDL Ration Measurement: --  Triglycerides, Serum: 110

## 2022-03-31 NOTE — BH PATIENT PROFILE - HOME MEDICATIONS
atorvastatin 10 mg oral tablet , 1 tab(s) orally once a day (at bedtime)  hydrOXYzine hydrochloride 25 mg oral tablet , 1 tab(s) orally 3 times a day, As Needed -Anxiety   freetext medication     - , 1 tab(s) orally once a day (at bedtime)  traZODone 50 mg oral tablet , 0.5 tab(s) orally once a day (at bedtime)   DULoxetine 60 mg oral delayed release capsule , 1 cap(s) orally once a day

## 2022-03-31 NOTE — BH SOCIAL WORK INITIAL PSYCHOSOCIAL EVALUATION - NSCMSPTSTRENGTHS_PSY_ALL_CORE
Able to adapt/Compliance to treatment/Courageous/Expressive of emotions/Anamaria/spirituality/Flexibility/Highly motivated for treatment/Intelligence

## 2022-03-31 NOTE — BH PATIENT PROFILE - NSSBIRTALCACTION/INTER_GEN_A_CORE
None O-T Plasty Text: The defect edges were debeveled with a #15 scalpel blade.  Given the location of the defect, shape of the defect and the proximity to free margins an O-T plasty was deemed most appropriate.  Using a sterile surgical marker, an appropriate O-T plasty was drawn incorporating the defect and placing the expected incisions within the relaxed skin tension lines where possible.    The area thus outlined was incised deep to adipose tissue with a #15 scalpel blade.  The skin margins were undermined to an appropriate distance in all directions utilizing iris scissors.

## 2022-03-31 NOTE — BH INPATIENT PSYCHIATRY ASSESSMENT NOTE - HPI (INCLUDE ILLNESS QUALITY, SEVERITY, DURATION, TIMING, CONTEXT, MODIFYING FACTORS, ASSOCIATED SIGNS AND SYMPTOMS)
Patient seen for assessment of depression, chart reviewed, and case discussed with treatment team.  No events reported overnight.  The patient was able to confirm her history as reports in the ED assessment (see below).  She states she started to have some worsening of depression and anxiety a few days after discharge, with waxing and waning of symptoms.  Earlier this week, the patient was going to go back to her school to see some friends, but became very anxious and had to turn around.  She was very upset by this, feeling guilt and shame, and started to worsen dramatically since then.  She admits to having SI with thoughts to OD without specific plan.  On the unit, she continues to have passive SI without any active SI or plan.  The patient also complains of insomnia, worsening appetite, and thoughts of cutting - but denies going through with it.  The patient has no substance use history.  No brielle or psychosis.  The patient found groups and individual therapy helpful during last hospitalization, and is agreeable to continue here.  She was also agreeable to increasing Cymbalta, and starting Klonopin 0.5mg hs for insomnia.    From ED assessment:  21-year-old female, single, non-caregiver residing with her parents, currently attending [employer], with PPhx anxiety, panic and depression with long hx of outpatient treatment (currently in treatment with Dr. Morataya for the past 3 years), no history of violence, no legal involvement, no self-injurious behaviors, no suicide attempts, 1 prior psychiatric hospitalization recently at [facility] from 2/28-3/9/22), and no access to guns/weapons, who presents to [facility] ED BIB father on recommendation from therapist due to progressive depressive and anxious symptoms with SI.    On interview, patient reports depressive symptoms (hopelessness, rumination, sadness, concentration/attention deficit, feelings of remorse/guilt, dec sleep/appetite) and anxiety (worry about various things, palpitations, nausea at times) that started around 2 days following discharge but progressed. She states symptoms occur sporadically but generally don't last through the day (until more recently). Patient endorses associated suicidality, passive, but with method around OD/taking more medication than prescribed. Denies intent. Patient additionally endorses urges toward self-harm (cutting/scratching at skin). She denies prior history related to this but describes during periods of anxiety she digs her nails in skin though without injury. She endorses recent stressors related to a recent cold and missing a trip to her campus (states the drive is 1.5 hrs long and can be triggering as commuted has been a major source of stress regarding college, she turned her car around after starting the trip and has been ruminating on this/feeling regret).     Patient denies psychotic and manic symptoms. She further denies substance use presently and historically. She endorses diagnoses of seasonal allergies (takes Claritin), hyperlipidemia (takes Atorvastatin).     Today, patient came to ED following recommendation of therapist after mentioning that she is experiencing SI. She is seeking voluntary admission for stability of acute, progressive depressive/anxious symptoms with SI. She feels her medications may benefit from adjustment as she feels currently they haven't continued to help post-discharge.    On collateral with father:  corroborates the above, states patient had on/off anxiety since discharge that had worsened. Reports patient was forthcoming about how she was feeling including her thoughts around SH/SI. Feels patient would benefit from hospitalization for stability and optimization of medication regimen.    On collateral with Dr. Morataya:  Reports patient has been struggling with continued depression/anxiety but did not disclose SI during Monday appointment (two days prior to current presentation). Writer informed her about stressor related to missed college trip. Dr. Morataya further elaborated that college/education has been a major stressor due to current halt in coursework. She states patient's family/therapist have been in contact regarding presentation and all are in agreement with hospitalization for stability and optimization of medication mgmt. She feels in addition to FRANSISCO/MDD patient may have experienced an ego injury related to coursework as a precipitant.

## 2022-03-31 NOTE — BH PATIENT PROFILE - FALL HARM RISK - UNIVERSAL INTERVENTIONS
Bed in lowest position, wheels locked, appropriate side rails in place/Call bell, personal items and telephone in reach/Instruct patient to call for assistance before getting out of bed or chair/Non-slip footwear when patient is out of bed/[place]ake to call system/Physically safe environment - no spills, clutter or unnecessary equipment/Purposeful Proactive Rounding/Room/bathroom lighting operational, light cord in reach

## 2022-03-31 NOTE — BH INPATIENT PSYCHIATRY ASSESSMENT NOTE - OTHER PAST PSYCHIATRIC HISTORY (INCLUDE DETAILS REGARDING ONSET, COURSE OF ILLNESS, INPATIENT/OUTPATIENT TREATMENT)
1 prior hospitalization at  at [facility] 2/28-3/9, outpatient care under Dr. Morataya for past 3 years, therapist Aida Martines

## 2022-03-31 NOTE — BH INPATIENT PSYCHIATRY ASSESSMENT NOTE - CURRENT MEDICATION
MEDICATIONS  (STANDING):  atorvastatin 10 milliGRAM(s) Oral at bedtime  clonazePAM  Tablet 0.5 milliGRAM(s) Oral at bedtime  loratadine 10 milliGRAM(s) Oral at bedtime  melatonin 5 milliGRAM(s) Oral at bedtime  traZODone 25 milliGRAM(s) Oral at bedtime  Vestura 1 Tablet(s) 1 Tablet(s) Oral at bedtime    MEDICATIONS  (PRN):  hydrOXYzine hydrochloride 50 milliGRAM(s) Oral every 6 hours PRN Anxiety  LORazepam     Tablet 2 milliGRAM(s) Oral every 6 hours PRN anxiety or agitation  LORazepam   Injectable 2 milliGRAM(s) IntraMuscular Once PRN severe anxiety or agitation

## 2022-04-01 PROCEDURE — 99231 SBSQ HOSP IP/OBS SF/LOW 25: CPT

## 2022-04-01 PROCEDURE — 90853 GROUP PSYCHOTHERAPY: CPT

## 2022-04-01 PROCEDURE — 90832 PSYTX W PT 30 MINUTES: CPT | Mod: 59

## 2022-04-01 RX ADMIN — Medication 0.5 MILLIGRAM(S): at 20:20

## 2022-04-01 RX ADMIN — LORATADINE 10 MILLIGRAM(S): 10 TABLET ORAL at 20:20

## 2022-04-01 RX ADMIN — ATORVASTATIN CALCIUM 10 MILLIGRAM(S): 80 TABLET, FILM COATED ORAL at 20:20

## 2022-04-01 RX ADMIN — DULOXETINE HYDROCHLORIDE 80 MILLIGRAM(S): 30 CAPSULE, DELAYED RELEASE ORAL at 08:57

## 2022-04-01 RX ADMIN — Medication 2 MILLIGRAM(S): at 10:29

## 2022-04-01 RX ADMIN — Medication 25 MILLIGRAM(S): at 20:21

## 2022-04-01 RX ADMIN — Medication 5 MILLIGRAM(S): at 20:20

## 2022-04-01 NOTE — BH TREATMENT PLAN - ANXIETY/PANIC/FEAR NURSING INTERVENTIONS/RECOMMENDATIONS
Monitor mood and behavior. Observe for signs of increased anxiety and panic. Provide staff support and reassurance. Assist in developing more effective coping skills.

## 2022-04-01 NOTE — DIETITIAN INITIAL EVALUATION ADULT. - OTHER INFO
Patient admitted to [facility]/[place] Patient admitted to [facility] d/t progressive depressive and anxious symptoms and SI. Patient lying in bed at time of interview. States appetite is "barely there". Follows a Kosher diet. Requested 2x kosher entree, stating that the portions are small. Reports family brings her in dinner. Patient reports losing 7# during her last recent psychiatric hospitalization. States has nausea at times d/t anxiety. PO intake encouraged.

## 2022-04-02 PROCEDURE — 99231 SBSQ HOSP IP/OBS SF/LOW 25: CPT

## 2022-04-02 RX ADMIN — LORATADINE 10 MILLIGRAM(S): 10 TABLET ORAL at 21:35

## 2022-04-02 RX ADMIN — Medication 25 MILLIGRAM(S): at 21:35

## 2022-04-02 RX ADMIN — Medication 5 MILLIGRAM(S): at 21:35

## 2022-04-02 RX ADMIN — DULOXETINE HYDROCHLORIDE 80 MILLIGRAM(S): 30 CAPSULE, DELAYED RELEASE ORAL at 09:30

## 2022-04-02 RX ADMIN — ATORVASTATIN CALCIUM 10 MILLIGRAM(S): 80 TABLET, FILM COATED ORAL at 21:35

## 2022-04-02 RX ADMIN — Medication 2 MILLIGRAM(S): at 10:58

## 2022-04-02 RX ADMIN — Medication 0.5 MILLIGRAM(S): at 21:35

## 2022-04-03 PROCEDURE — 99231 SBSQ HOSP IP/OBS SF/LOW 25: CPT

## 2022-04-03 RX ADMIN — LORATADINE 10 MILLIGRAM(S): 10 TABLET ORAL at 21:19

## 2022-04-03 RX ADMIN — Medication 5 MILLIGRAM(S): at 21:19

## 2022-04-03 RX ADMIN — Medication 25 MILLIGRAM(S): at 21:18

## 2022-04-03 RX ADMIN — Medication 2 MILLIGRAM(S): at 10:55

## 2022-04-03 RX ADMIN — Medication 0.5 MILLIGRAM(S): at 21:26

## 2022-04-03 RX ADMIN — ATORVASTATIN CALCIUM 10 MILLIGRAM(S): 80 TABLET, FILM COATED ORAL at 21:19

## 2022-04-03 RX ADMIN — DULOXETINE HYDROCHLORIDE 80 MILLIGRAM(S): 30 CAPSULE, DELAYED RELEASE ORAL at 09:14

## 2022-04-03 NOTE — BH INPATIENT PSYCHIATRY PROGRESS NOTE - MSE UNSTRUCTURED FT
On exam today the patient is tearful and dysphoric with minimal eye contact.    Speech is clear and of normal rate.    Thought process: with no evidence of a disorder of thought process.    Thought content: with no evidence of psychotic beliefs.  Perception: Denies hallucinations.    Mood: Describes as "depressed ".  Affect: constricted.    Patient with hopelessness and passive S/I but denies active suicidal ideation, intent and plan.    Patient denies active aggressive/homicidal ideation, intent or plan.   Patient is Alert and oriented in all spheres. Patient is cognitively grossly intact.   Insight and judgment are limited. Impulse control is intact at this time.    
The patient appears stated age, with good hygiene, and is dressed appropriately.  She was calm and cooperative with the interview.  She maintained appropriate eye contact.  No restlessness or slowing of movements observed.  Gait is steady.  The patient’s speech was fluent, normal in tone, rate, and volume.  The patient’s mood is “depressed.”  Her affect is constricted, tearful, stable, appropriate.  The patient’s thoughts are goal directed.  She does not have any delusions or hallucinations.  She has passive suicidal ideation without active SI, intent or plan in the hospital.  No homicidal ideation, intent, or plan.  Insight is good.  Judgment is good.  Impulse control has been good on the unit.
On exam today the patient is tearful and dysphoric with minimal eye contact.    Speech is clear and of normal rate.    Thought process: with no evidence of a disorder of thought process.    Thought content: with no evidence of psychotic beliefs.  Perception: Denies hallucinations.    Mood: Describes as "so depressed ".  Affect: constricted.    Patient with hopelessness and passive S/I but denies active suicidal ideation, intent and plan.    Patient denies active aggressive/homicidal ideation, intent or plan.   Patient is Alert and oriented in all spheres. Patient is cognitively grossly intact.   Insight and judgment are limited. Impulse control is intact at this time.

## 2022-04-04 PROCEDURE — 99231 SBSQ HOSP IP/OBS SF/LOW 25: CPT

## 2022-04-04 PROCEDURE — 90832 PSYTX W PT 30 MINUTES: CPT

## 2022-04-04 RX ADMIN — LORATADINE 10 MILLIGRAM(S): 10 TABLET ORAL at 21:03

## 2022-04-04 RX ADMIN — ATORVASTATIN CALCIUM 10 MILLIGRAM(S): 80 TABLET, FILM COATED ORAL at 21:03

## 2022-04-04 RX ADMIN — Medication 5 MILLIGRAM(S): at 21:03

## 2022-04-04 RX ADMIN — DULOXETINE HYDROCHLORIDE 80 MILLIGRAM(S): 30 CAPSULE, DELAYED RELEASE ORAL at 09:23

## 2022-04-04 RX ADMIN — Medication 0.5 MILLIGRAM(S): at 21:03

## 2022-04-04 RX ADMIN — Medication 25 MILLIGRAM(S): at 21:03

## 2022-04-05 PROCEDURE — 90832 PSYTX W PT 30 MINUTES: CPT

## 2022-04-05 PROCEDURE — 99231 SBSQ HOSP IP/OBS SF/LOW 25: CPT

## 2022-04-05 RX ORDER — CLONAZEPAM 1 MG
0.5 TABLET ORAL AT BEDTIME
Refills: 0 | Status: DISCONTINUED | OUTPATIENT
Start: 2022-04-05 | End: 2022-04-07

## 2022-04-05 RX ADMIN — LORATADINE 10 MILLIGRAM(S): 10 TABLET ORAL at 21:14

## 2022-04-05 RX ADMIN — Medication 2 MILLIGRAM(S): at 09:36

## 2022-04-05 RX ADMIN — DULOXETINE HYDROCHLORIDE 80 MILLIGRAM(S): 30 CAPSULE, DELAYED RELEASE ORAL at 08:25

## 2022-04-05 RX ADMIN — Medication 0.5 MILLIGRAM(S): at 21:13

## 2022-04-05 RX ADMIN — Medication 5 MILLIGRAM(S): at 21:13

## 2022-04-05 RX ADMIN — ATORVASTATIN CALCIUM 10 MILLIGRAM(S): 80 TABLET, FILM COATED ORAL at 21:13

## 2022-04-05 RX ADMIN — Medication 25 MILLIGRAM(S): at 21:14

## 2022-04-06 PROCEDURE — 90853 GROUP PSYCHOTHERAPY: CPT

## 2022-04-06 PROCEDURE — 90832 PSYTX W PT 30 MINUTES: CPT

## 2022-04-06 PROCEDURE — 99231 SBSQ HOSP IP/OBS SF/LOW 25: CPT | Mod: 25

## 2022-04-06 RX ORDER — ACETAMINOPHEN 500 MG
650 TABLET ORAL EVERY 6 HOURS
Refills: 0 | Status: DISCONTINUED | OUTPATIENT
Start: 2022-04-06 | End: 2022-04-12

## 2022-04-06 RX ORDER — CALCIUM CARBONATE 500(1250)
1 TABLET ORAL
Refills: 0 | Status: DISCONTINUED | OUTPATIENT
Start: 2022-04-06 | End: 2022-04-12

## 2022-04-06 RX ADMIN — Medication 0.5 MILLIGRAM(S): at 22:02

## 2022-04-06 RX ADMIN — ATORVASTATIN CALCIUM 10 MILLIGRAM(S): 80 TABLET, FILM COATED ORAL at 22:02

## 2022-04-06 RX ADMIN — LORATADINE 10 MILLIGRAM(S): 10 TABLET ORAL at 22:02

## 2022-04-06 RX ADMIN — Medication 5 MILLIGRAM(S): at 22:02

## 2022-04-06 RX ADMIN — Medication 25 MILLIGRAM(S): at 22:03

## 2022-04-06 RX ADMIN — DULOXETINE HYDROCHLORIDE 80 MILLIGRAM(S): 30 CAPSULE, DELAYED RELEASE ORAL at 10:56

## 2022-04-06 NOTE — CHART NOTE - NSCHARTNOTEFT_GEN_A_CORE
Source: Patient [x]    Family [ ]     other [ ]    Diet : Diet, DASH/TLC:   Sodium & Cholesterol Restricted  Kosher (03-31-22 @ 10:41)        Patient reports [ ] nausea  [ ] vomiting [ ] diarrhea [ ] constipation  [ ]chewing problems [ ] swallowing issues  [X] other: None     PO intake:  < 50% [ ] 50-75% [ ]   % [X]  other :  Food preferences taken and implemented.      Source for PO intake [X] Patient [ ] family [ ] chart [ ] staff [ ] other      Current Weight: Weight (kg): 73.5 (03-30 @ 19:17) admission     Pertinent Medications: MEDICATIONS  (STANDING):  atorvastatin 10 milliGRAM(s) Oral at bedtime  clonazePAM  Tablet 0.5 milliGRAM(s) Oral at bedtime  DULoxetine 80 milliGRAM(s) Oral daily  loratadine 10 milliGRAM(s) Oral at bedtime  melatonin 5 milliGRAM(s) Oral at bedtime  traZODone 25 milliGRAM(s) Oral at bedtime  Vestura 1 Tablet(s) 1 Tablet(s) Oral at bedtime      Pertinent Labs:  none    Skin: Intact     Estimated Needs:   [x] no change since previous assessment  [ ] recalculated:       Previous Nutrition Diagnosis:     [ ] Inadequate Energy Intake [x]Inadequate Oral Intake [ ] Excessive Energy Intake     [ ] Underweight [ ] Increased Nutrient Needs [ ] Overweight/Obesity     [ ] Altered GI Function [ ] Unintended Weight Loss [ ] Food & Nutrition Related Knowledge Deficit [ ] Malnutrition          Nutrition Diagnosis is [ ] ongoing  [x] resolved [ ] not applicable          New Nutrition Diagnosis: [x] not applicable    [ ] Inadequate Protein Energy Intake [ ]Inadequate Oral Intake [ ] Excessive Energy Intake     [ ] Underweight [ ] Increased Nutrient Needs [ ] Overweight/Obesity     [ ] Altered GI Function [ ] Unintended Weight Loss [ ] Food & Nutrition Related Knowledge Deficit[ ] Limited Adherence to nutrition related recommendations [ ] Malnutrition  [ ] other: Free text       Related to:      As evidenced by:      Interventions:     Recommend    [ ] Change Diet To:    [ ] Nutrition Supplement    [ ] Nutrition Support    [x] Other: Continue current diet       Monitoring and Evaluation:     [ ] PO intake [ ] Tolerance to diet prescription [x] weekly weights [x] follow up per protocol    [x] other:  Brooklynn Massey RDN Pager [phone]

## 2022-04-07 PROCEDURE — 90832 PSYTX W PT 30 MINUTES: CPT

## 2022-04-07 PROCEDURE — 99232 SBSQ HOSP IP/OBS MODERATE 35: CPT

## 2022-04-07 PROCEDURE — 90853 GROUP PSYCHOTHERAPY: CPT

## 2022-04-07 RX ORDER — DULOXETINE HYDROCHLORIDE 30 MG/1
90 CAPSULE, DELAYED RELEASE ORAL DAILY
Refills: 0 | Status: DISCONTINUED | OUTPATIENT
Start: 2022-04-07 | End: 2022-04-12

## 2022-04-07 RX ADMIN — LORATADINE 10 MILLIGRAM(S): 10 TABLET ORAL at 21:11

## 2022-04-07 RX ADMIN — ATORVASTATIN CALCIUM 10 MILLIGRAM(S): 80 TABLET, FILM COATED ORAL at 21:11

## 2022-04-07 RX ADMIN — Medication 25 MILLIGRAM(S): at 21:11

## 2022-04-07 RX ADMIN — DULOXETINE HYDROCHLORIDE 80 MILLIGRAM(S): 30 CAPSULE, DELAYED RELEASE ORAL at 09:09

## 2022-04-07 NOTE — BH PSYCHOLOGY - GROUP THERAPY NOTE - NSPSYCHOLGRPGENPT_PSY_A_CORE FT
Project Flex is an ACT-based group in which patients learn skills and explore themes of identity, compassion, resilience, and connection through the lens of marginalized identity. Group begins with setting group expectations and introductions that focus on identity exploration. Following introductions, the daily theme is presented through both didactics and experiential activities. Group today focused on the theme “compassion.” Patients were provided psychoeducation about compassion and engaged in discussion about the usefulness of self-compassion in their own lives.  led patients in discussions surrounding feelings of shame and marginalized identity, highlighting the role self-compassion can play in alleviating suffering.   taught two new coping strategies to assist in building self-compassion and defusing shame.  
Project Flex is an ACT-based group in which patients learn skills and explore themes of identity, compassion, resilience, and connection through the lens of marginalized identity. Group begins with setting group expectations and introductions that focus on identity exploration. Following introductions, the daily theme is presented through both didactics and experiential activities. Group today focused on the theme “connection.” Patients were provided psychoeducation about connection and engaged in discussion about the importance of developing healthy relationships.  led patients in an experiential exercise titled “Taking a Relationship Inventory” and helped patients to connect their values to their relationships.

## 2022-04-07 NOTE — BH PSYCHOLOGY - GROUP THERAPY NOTE - NSPSYCHOLGRPGENGOAL_PSY_A_CORE
improve social/vocational/coping skills/psychoeducation
improve social/vocational/coping skills/psychoeducation

## 2022-04-08 PROCEDURE — 90853 GROUP PSYCHOTHERAPY: CPT

## 2022-04-08 PROCEDURE — 99231 SBSQ HOSP IP/OBS SF/LOW 25: CPT

## 2022-04-08 RX ORDER — ACETAMINOPHEN 500 MG
650 TABLET ORAL EVERY 6 HOURS
Refills: 0 | Status: DISCONTINUED | OUTPATIENT
Start: 2022-04-08 | End: 2022-04-08

## 2022-04-08 RX ORDER — IBUPROFEN 200 MG
400 TABLET ORAL THREE TIMES A DAY
Refills: 0 | Status: DISCONTINUED | OUTPATIENT
Start: 2022-04-08 | End: 2022-04-12

## 2022-04-08 RX ADMIN — Medication 650 MILLIGRAM(S): at 08:31

## 2022-04-08 RX ADMIN — ATORVASTATIN CALCIUM 10 MILLIGRAM(S): 80 TABLET, FILM COATED ORAL at 20:37

## 2022-04-08 RX ADMIN — Medication 400 MILLIGRAM(S): at 12:36

## 2022-04-08 RX ADMIN — Medication 25 MILLIGRAM(S): at 20:37

## 2022-04-08 RX ADMIN — DULOXETINE HYDROCHLORIDE 90 MILLIGRAM(S): 30 CAPSULE, DELAYED RELEASE ORAL at 08:31

## 2022-04-08 RX ADMIN — Medication 400 MILLIGRAM(S): at 20:46

## 2022-04-08 RX ADMIN — Medication 400 MILLIGRAM(S): at 12:06

## 2022-04-08 RX ADMIN — LORATADINE 10 MILLIGRAM(S): 10 TABLET ORAL at 20:37

## 2022-04-08 RX ADMIN — Medication 650 MILLIGRAM(S): at 09:01

## 2022-04-08 NOTE — BH TREATMENT PLAN - NSCMSPTSTRENGTHS_PSY_ALL_CORE
Able to adapt/Compliance to treatment/Courageous/Expressive of emotions/Anamaria/spirituality/Flexibility/Highly motivated for treatment/Intelligence
Able to adapt/Compliance to treatment/Courageous/Expressive of emotions/Anamaria/spirituality/Flexibility/Highly motivated for treatment/Intelligence

## 2022-04-08 NOTE — BH TREATMENT PLAN - NSTXDEPRESGOAL_PSY_ALL_CORE
Will identify 2 coping skills that assist in improving mood
Report using a coping skill to overcome sadness and worry in order to socialize with peers daily

## 2022-04-08 NOTE — BH TREATMENT PLAN - NSTXSUICIDGOAL_PSY_ALL_CORE
Will verbalize a decrease in preoccupation with suicidal thoughts and / or intent to commit suicide to 2 on a 10-point scale
Be able to read an index card of soothing self-statements when having a suicidal thought to stay safe

## 2022-04-08 NOTE — BH TREATMENT PLAN - NSTXSUICIDINTERRN_PSY_ALL_CORE
Monitor mood and behavior. Provide safe and supportive environment. Encourage to verbalize feelings and concerns.
Monitor mood and behavior. Observe for signs of increased depression and increased SI. Provide safe and supportive environment. Encourage to verbalize feelings and concerns.

## 2022-04-08 NOTE — BH TREATMENT PLAN - NSTXPLANTHERAPYSESSIONSFT_PSY_ALL_CORE
04-07-22  Type of therapy: Cognitive behavior therapy,Dialectical behavior therapy,Coping skills,Creative arts therapy,Leisure development,Mindfulness,Music therapy,Peer advocate  Type of session: Individual  Level of patient participation: Engaged  Duration of participation: 20 minutes  Therapy conducted by: Psych rehab  Therapy Summary: Writer met with patient for an individual session in order to review progress towards psychiatric rehabilitation goals. Patient was verbal and forthcoming during session. Patient is engaged in unit activities. Patient was not a behavioral management issue during past 7 days.     Patient has attended approximately 98 percent of psychiatric rehabilitation groups over the past seven days. Patient has demonstrated improvement in mood. Patient reports she has been having a really good week. Patient reports she has been feeling better overall. Patient reports mood as calm and euthymic. Patient reports she has been feeling less anxious. Writer explored coping skills with patient. Patient reports coping skills such as puzzles, word search, and watching movies. Writer encouraged patient to continue to explore, utilize, and strenghten effective and healthy coping skills. Patient was receptive. Patient reports medication compliance over past 7 days. Patient has been visible on the unit. Patient has been appropriate with peers and staff.

## 2022-04-08 NOTE — BH TREATMENT PLAN - NSTXANXINTERPR_PSY_ALL_CORE
Psychiatric rehabilitation staff will continue to meet patient individually to provide support, encouragement, and counseling in order for patient to attend daily symptom management groups and identify 3 healthy coping skills for improved symptom management and sustained recovery over seven days.

## 2022-04-08 NOTE — BH TREATMENT PLAN - NSPTSTATEDGOAL_PSY_ALL_CORE
I want to feel better because right now I wish that I could never wake up. 
I want to feel better because right now I wish that I could never wake up.

## 2022-04-09 RX ADMIN — ATORVASTATIN CALCIUM 10 MILLIGRAM(S): 80 TABLET, FILM COATED ORAL at 21:51

## 2022-04-09 RX ADMIN — Medication 400 MILLIGRAM(S): at 03:22

## 2022-04-09 RX ADMIN — Medication 400 MILLIGRAM(S): at 11:31

## 2022-04-09 RX ADMIN — LORATADINE 10 MILLIGRAM(S): 10 TABLET ORAL at 21:51

## 2022-04-09 RX ADMIN — DULOXETINE HYDROCHLORIDE 90 MILLIGRAM(S): 30 CAPSULE, DELAYED RELEASE ORAL at 09:10

## 2022-04-09 RX ADMIN — Medication 25 MILLIGRAM(S): at 21:51

## 2022-04-10 RX ADMIN — ATORVASTATIN CALCIUM 10 MILLIGRAM(S): 80 TABLET, FILM COATED ORAL at 20:52

## 2022-04-10 RX ADMIN — Medication 25 MILLIGRAM(S): at 20:51

## 2022-04-10 RX ADMIN — DULOXETINE HYDROCHLORIDE 90 MILLIGRAM(S): 30 CAPSULE, DELAYED RELEASE ORAL at 09:00

## 2022-04-10 RX ADMIN — LORATADINE 10 MILLIGRAM(S): 10 TABLET ORAL at 20:52

## 2022-04-11 PROCEDURE — 90853 GROUP PSYCHOTHERAPY: CPT

## 2022-04-11 PROCEDURE — 90832 PSYTX W PT 30 MINUTES: CPT | Mod: 59

## 2022-04-11 PROCEDURE — 99231 SBSQ HOSP IP/OBS SF/LOW 25: CPT | Mod: 25

## 2022-04-11 RX ORDER — DULOXETINE HYDROCHLORIDE 30 MG/1
3 CAPSULE, DELAYED RELEASE ORAL
Qty: 90 | Refills: 0
Start: 2022-04-11 | End: 2022-05-10

## 2022-04-11 RX ORDER — TRAZODONE HCL 50 MG
0.5 TABLET ORAL
Qty: 15 | Refills: 0
Start: 2022-04-11 | End: 2022-05-10

## 2022-04-11 RX ADMIN — DULOXETINE HYDROCHLORIDE 90 MILLIGRAM(S): 30 CAPSULE, DELAYED RELEASE ORAL at 08:40

## 2022-04-11 RX ADMIN — LORATADINE 10 MILLIGRAM(S): 10 TABLET ORAL at 20:40

## 2022-04-11 RX ADMIN — Medication 25 MILLIGRAM(S): at 20:40

## 2022-04-11 RX ADMIN — ATORVASTATIN CALCIUM 10 MILLIGRAM(S): 80 TABLET, FILM COATED ORAL at 20:40

## 2022-04-11 NOTE — BH PSYCHOLOGY - GROUP THERAPY NOTE - NSBHPTASSESSDT_PSY_A_CORE
31-Mar-2022 15:15
06-Apr-2022 09:15
07-Apr-2022 15:15
07-Apr-2022 11:15
11-Apr-2022 11:15
01-Apr-2022 11:15
08-Apr-2022 11:00

## 2022-04-11 NOTE — BH PSYCHOLOGY - GROUP THERAPY NOTE - NSBHPSYCHOLPARTICIP_PSY_A_CORE
Fully engaged
Partially engaged
Fully engaged

## 2022-04-11 NOTE — BH INPATIENT PSYCHIATRY DISCHARGE NOTE - NSDCCPCAREPLAN_GEN_ALL_CORE_FT
PRINCIPAL DISCHARGE DIAGNOSIS  Diagnosis: MDD (major depressive disorder)  Assessment and Plan of Treatment:       SECONDARY DISCHARGE DIAGNOSES  Diagnosis: Generalized anxiety disorder  Assessment and Plan of Treatment:

## 2022-04-11 NOTE — BH PSYCHOLOGY - CLINICIAN PSYCHOTHERAPY NOTE - NSBHPSYCHOLGOALS_PSY_A_CORE
Decrease symptoms/Assessment/Improve social/vocational/coping skills/Psychoeducation
Yes

## 2022-04-11 NOTE — BH PSYCHOLOGY - GROUP THERAPY NOTE - NSBHPSYCHOLRESPONSE_PSY_A_CORE
Coping skills acquired/Accepted support
Insight displayed/Accepted support
Insight displayed/Accepted support
Accepted support
Coping skills acquired/Insight displayed/Accepted support
Coping skills acquired/Accepted support
Coping skills acquired/Insight displayed/Accepted support

## 2022-04-11 NOTE — BH PSYCHOLOGY - GROUP THERAPY NOTE - NSPSYCHOLGRPBILLING_PSY_A_CORE
[id number] - Group Psychotherapy
[id number] - Group Psychotherapy
[id number] - Group Psychotherapy
[id number] - Group Psychotherapy
[id number] - Group Psychotherapy
[id number] - Group Psychotherapy
[id number] - Group Psychotherapy

## 2022-04-11 NOTE — BH PSYCHOLOGY - GROUP THERAPY NOTE - NSBHPSYCHOLASSESSPROV_PSY_A_CORE
Licensed Psychologist
Licensed Staff Psychologist
Licensed Staff Psychologist
Licensed Psychologist
Licensed Psychologist

## 2022-04-11 NOTE — BH SAFETY PLAN - WARNING SIGN 4
Demetrio Enciso for attending Dr. Flores: 56 y/o female with a PMHx of alcohol abuse, basal cell carcinoma excision, COVID, depression, hemorrhoids, squamous cell skin cancer, withdrawal seizures presents to the ED sent by MD for elevated bilirubin and distended abd. Pt reports she had COVID in December and followed up with her PMD today. Pt found to have an elevated bilirubin, was c/o abd distention and pain and was sent to the ED for further evaluation. Denies black/bloody stools. No other complaints at this time. PCP: Dr. Rothman. Will send pt to main ED for further evaluation. Feelings of hopelessness

## 2022-04-11 NOTE — BH PSYCHOLOGY - CLINICIAN PSYCHOTHERAPY NOTE - NSBHPTASSESSDT_PSY_A_CORE
31-Mar-2022 11:45
07-Apr-2022 13:15
04-Apr-2022 10:00
06-Apr-2022 08:45
11-Apr-2022 09:20
01-Apr-2022 09:55
05-Apr-2022 08:50

## 2022-04-11 NOTE — BH INPATIENT PSYCHIATRY DISCHARGE NOTE - NSBHMETABOLIC_PSY_ALL_CORE_FT
BMI: BMI (kg/m2): 27.8 (03-30-22 @ 19:17)  HbA1c: A1C with Estimated Average Glucose Result: 4.7 % (03-01-22 @ 10:31)    Glucose:   BP: 113/69 (04-11-22 @ 07:57) (113/69 - 114/68)  Lipid Panel: Date/Time: 03-01-22 @ 10:31  Cholesterol, Serum: 164  Direct LDL: --  HDL Cholesterol, Serum: 61  Total Cholesterol/HDL Ration Measurement: --  Triglycerides, Serum: 110

## 2022-04-11 NOTE — BH PSYCHOLOGY - GROUP THERAPY NOTE - NSPSYCHOLGRPDBTTOL_PSY_A_CORE FT
na
na
Radical Acceptance 
IMPROVE the Moment
taught Distress Tolerance skills of Pros and Cons and STOP

## 2022-04-11 NOTE — BH PSYCHOLOGY - CLINICIAN PSYCHOTHERAPY NOTE - NSBHPSYCHOLBILLFAM_PSY_A_CORE
[id number] - 16 to 37 minutes
[id number] - 16 to 37 minutes
[id number] - 16 to 37 minutes
[id number] - 16 to 37 minutes
[id number] - 16 to 37 minutes
[id number] - 16 to 37 minutes
[id number] - 16 to 37 minutes

## 2022-04-11 NOTE — BH PSYCHOLOGY - CLINICIAN PSYCHOTHERAPY NOTE - NSBHPSYCHOLSERV_PSY_A_CORE
Individual psychotherapy

## 2022-04-11 NOTE — BH INPATIENT PSYCHIATRY DISCHARGE NOTE - OTHER PAST PSYCHIATRIC HISTORY (INCLUDE DETAILS REGARDING ONSET, COURSE OF ILLNESS, INPATIENT/OUTPATIENT TREATMENT)
1 prior hospitalization at  at [facility] 2/28-3/9, outpatient care under Dr. Morataya for past 3 years, therapist Aida Martines

## 2022-04-11 NOTE — BH PSYCHOLOGY - GROUP THERAPY NOTE - NSBHPSYCHOLGRPTYPE_PSY_A_CORE
DBT Life Skills
General Group Therapy
DBT Life Skills
DBT Life Skills
General Group Therapy
DBT Life Skills
DBT Life Skills

## 2022-04-11 NOTE — BH PSYCHOLOGY - GROUP THERAPY NOTE - NSBHPSYCHOLGRPNAME_PSY_A_CORE
DBT Skills
DBT Homework
Developing Social Supports
Developing Social Supports
DBT Skills

## 2022-04-11 NOTE — BH PSYCHOLOGY - CLINICIAN PSYCHOTHERAPY NOTE - NSTXSUICIDGOAL_PSY_ALL_CORE
Be able to read an index card of soothing self-statements when having a suicidal thought to stay safe
Will verbalize a decrease in preoccupation with suicidal thoughts and / or intent to commit suicide to 2 on a 10-point scale

## 2022-04-11 NOTE — BH INPATIENT PSYCHIATRY DISCHARGE NOTE - HOSPITAL COURSE
Patient is a 21-year-old female with a history of MDD, FRANSISCO, and panic disorder who presents to the hospital after informing her therapist about her plans for suicidal overdose. This is in the context of a recent psychiatric hospitalization, and worsening depressive symptoms. She meets criteria for major depressive disorder with anxious distress, given her significant symptoms of anxiety during periods of depression. Given her recent suicidality and recent psychiatric hospitalization, she has a danger to result in his criteria for inpatient psychiatric hospitalization.  PsyHx: Sees Dr. Morataya x 3 years. Therapist Aida Martines 1x hospitalization 1 month ago at [facility].  SH: [employer] [facility] [employer] [facility]. Lives with parents.    Patient was initially started on Klonopin 0.5 mg QHS and melatonin for insomnia. Her dose of duloxetine was titrated to 90 mg daily.   Risks, benefits, and side effects of all medication prescribed were discussed in detail, including the FDA-issued black box warning relaying the increased risk of suicidality for antidepressants in patients under the age of 24.    Over the course of hospitalization, the patient initially complained of severe depression and anxiety, in addition to insomnia. She reported that her depression returned soon after her previous hospitalization, and she was unsure how effective pharmacotherapy was. We discussed the use of ECT to treat refractory depressive symptoms, but after an extended conversation with the patient and her father about risks/benefits they declined ECT in favor of PHP after discharge. Near the end of hospitalization, her depression improved dramatically and rapidly; this appears to be the typical course of her improvement. At discharge the patient was much more forward thinking with respect to her mood, and felt that having someone "check on her every day" would be beneficial. She was an active participant in individual and group therapy, and she and her father agreed with the plan for discharge.     No medical issues, restraints, or self-injurious behavior noted during the hospitalization.    At the time of discharge, the patient reported improved mood, exhibited a euthymic affect, and denied SI/HI/AVH or desire to self-harm. The patient was future-oriented with respect to her schooling. The patient denied any intolerable side effects and agreed with the plan for discharge due to the patient’s confidence that treatment could be successfully continued as an outpatient.    Risk assessment:  On admission, acute risk factors included: Suicidal ideation, major depressive episode, severe anxiety, recent psychosocial stressor  Chronic risk factors included: Diagnosis of MDD, past psychiatric hospitalizations, history of suicidal ideation, social isolation    Acute risk factors were mitigated during hospitalization and overall risk had returned to baseline levels by the time of discharge. However, the patient remains at elevated risk of suicide given chronic risk factors, which would not be reduced further by continued hospitalization and are best managed on an outpatient basis. In addition, the patient has numerous protective factors, including treatment adherence, close involvement of family throughout hospitalization, limited access to lethal means (reported by family and patient), social support, forward thinking regarding school/career, high premorbid functioning. The patient was able to engage in safety planning, and neither the patient nor family identified any barriers to discharge.   Therefore, the patient no longer met criteria for inpatient psychiatric hospitalization and was discharged from the 1S unit.     DSM-5 diagnosis:  Major depressive disorder with anxious distress  Generalized anxiety disorder    Discharge medication:  - Duloxetine 90 mg daily  - Trazodone 25 mg QHS

## 2022-04-11 NOTE — BH PSYCHOLOGY - CLINICIAN PSYCHOTHERAPY NOTE - NSTXDEPRESGOAL_PSY_ALL_CORE
Will identify 2 coping skills that assist in improving mood
Report using a coping skill to overcome sadness and worry in order to socialize with peers daily
Will identify 2 coping skills that assist in improving mood
Will identify 2 coping skills that assist in improving mood

## 2022-04-11 NOTE — BH PSYCHOLOGY - GROUP THERAPY NOTE - NSPSYCHOLGRPDBTINT_PSY_A_CORE FT
taught emotion regulation skill 
taught emotion regulation skill 
taught Distress Tolerance skills of Pros and Cons and STOP
taught distress tolerance skill

## 2022-04-11 NOTE — BH PSYCHOLOGY - CLINICIAN PSYCHOTHERAPY NOTE - TOKEN PULL-DIAGNOSIS
Primary Diagnosis:  Major depressive disorder [F32.9]        Problem Dx:   Insomnia [G47.00]      Hyperlipidemia [E78.5]      Generalized anxiety disorder [F41.1]      Major depressive disorder [F32.9]      
Primary Diagnosis:  Major depressive disorder [F32.9]        Problem Dx:   Insomnia [G47.00]      Hyperlipidemia [E78.5]      Generalized anxiety disorder [F41.1]      Major depressive disorder [F32.9]

## 2022-04-11 NOTE — BH INPATIENT PSYCHIATRY DISCHARGE NOTE - HPI (INCLUDE ILLNESS QUALITY, SEVERITY, DURATION, TIMING, CONTEXT, MODIFYING FACTORS, ASSOCIATED SIGNS AND SYMPTOMS)
Patient seen for assessment of depression, chart reviewed, and case discussed with treatment team.  No events reported overnight.  The patient was able to confirm her history as reports in the ED assessment (see below).  She states she started to have some worsening of depression and anxiety a few days after discharge, with waxing and waning of symptoms.  Earlier this week, the patient was going to go back to her school to see some friends, but became very anxious and had to turn around.  She was very upset by this, feeling guilt and shame, and started to worsen dramatically since then.  She admits to having SI with thoughts to OD without specific plan.  On the unit, she continues to have passive SI without any active SI or plan.  The patient also complains of insomnia, worsening appetite, and thoughts of cutting - but denies going through with it.  The patient has no substance use history.  No brielle or psychosis.  The patient found groups and individual therapy helpful during last hospitalization, and is agreeable to continue here.  She was also agreeable to increasing Cymbalta, and starting Klonopin 0.5mg hs for insomnia.    From ED assessment:  21-year-old female, single, non-caregiver residing with her parents, currently attending [employer], with PPhx anxiety, panic and depression with long hx of outpatient treatment (currently in treatment with Dr. Morataya for the past 3 years), no history of violence, no legal involvement, no self-injurious behaviors, no suicide attempts, 1 prior psychiatric hospitalization recently at [facility] 1S from 2/28-3/9/22), and no access to guns/weapons, who presents to [facility] ED BIB father on recommendation from therapist due to progressive depressive and anxious symptoms with SI.    On interview, patient reports depressive symptoms (hopelessness, rumination, sadness, concentration/attention deficit, feelings of remorse/guilt, dec sleep/appetite) and anxiety (worry about various things, palpitations, nausea at times) that started around 2 days following discharge but progressed. She states symptoms occur sporadically but generally don't last through the day (until more recently). Patient endorses associated suicidality, passive, but with method around OD/taking more medication than prescribed. Denies intent. Patient additionally endorses urges toward self-harm (cutting/scratching at skin). She denies prior history related to this but describes during periods of anxiety she digs her nails in skin though without injury. She endorses recent stressors related to a recent cold and missing a trip to her campus (states the drive is 1.5 hrs long and can be triggering as commuted has been a major source of stress regarding college, she turned her car around after starting the trip and has been ruminating on this/feeling regret).     Patient denies psychotic and manic symptoms. She further denies substance use presently and historically. She endorses diagnoses of seasonal allergies (takes Claritin), hyperlipidemia (takes Atorvastatin).     Today, patient came to ED following recommendation of therapist after mentioning that she is experiencing SI. She is seeking voluntary admission for stability of acute, progressive depressive/anxious symptoms with SI. She feels her medications may benefit from adjustment as she feels currently they haven't continued to help post-discharge.    On collateral with father:  corroborates the above, states patient had on/off anxiety since discharge that had worsened. Reports patient was forthcoming about how she was feeling including her thoughts around SH/SI. Feels patient would benefit from hospitalization for stability and optimization of medication regimen.    On collateral with Dr. Morataya:  Reports patient has been struggling with continued depression/anxiety but did not disclose SI during Monday appointment (two days prior to current presentation). Writer informed her about stressor related to missed college trip. Dr. Morataya further elaborated that college/education has been a major stressor due to current halt in coursework. She states patient's family/therapist have been in contact regarding presentation and all are in agreement with hospitalization for stability and optimization of medication mgmt. She feels in addition to FRANSISCO/MDD patient may have experienced an ego injury related to coursework as a precipitant.

## 2022-04-11 NOTE — BH PSYCHOLOGY - CLINICIAN PSYCHOTHERAPY NOTE - NSBHPSYCHOLPROBS_PSY_ALL_CORE
Anxiety/Depression/Suicidality

## 2022-04-11 NOTE — BH PSYCHOLOGY - CLINICIAN PSYCHOTHERAPY NOTE - NSTXANXGOAL_PSY_ALL_CORE
Identify and practice 3 coping skills to manage anxiety
Report that he/she was able to initiate conversations with peers 3 times daily despite panic attacks
Identify and practice 3 coping skills to manage anxiety

## 2022-04-11 NOTE — BH PSYCHOLOGY - CLINICIAN PSYCHOTHERAPY NOTE - NSBHPSYCHOLNARRATIVE_PSY_A_CORE FT
Patient was alert, cooperative, and in control. Oriented x3. Casually dressed, fairly groomed. Maintained good eye contact. Speech normal in production, rate, volume, and tone. No abnormal psychomotor behavior. Mood "depressed" with congruent, tearful affect. Thought process logical, goal-directed. Thought content relevant to discussion. Expressed suicidal ideation ("I still want to die"), denied current intent, plan, and self-harm urges. Committed to remaining safe on the unit and informing staff if unable to manage behaviors. Denied auditory/visual hallucinations and homicidal ideation, intent, and plan. Impulse control intact. Insight and judgment fair.    Patient reported continued depression, anxiety, suicidal ideation, and hopelessness, had difficulty staying asleep due to disturbances from peers and difficulty falling back asleep, also discussed feeling anxious around peers who are arguing. Discussed sleep hygiene and mindful breathing. Session also focused on commitment strategies and building a life worth living. Discussed values in the domain of career. Engaged in values clarification and helped patient notice unhelpful thoughts. Encouraged patient to continue to practice skills and focus on identifying values.
Patient was alert, cooperative, and in control. Oriented x3. Casually dressed, fairly groomed. Maintained good eye contact. Speech normal in production, rate, volume, and tone. No abnormal psychomotor behavior. Mood "depressed" with congruent, tearful affect. Thought process logical, goal-directed. Thought content relevant to discussion. Expressed suicidal ideation ("I still want to die"), denied current intent, plan, and self-harm urges. Committed to remaining safe on the unit and informing staff if unable to manage behaviors. Denied auditory/visual hallucinations and homicidal ideation, intent, and plan. Impulse control intact. Insight and judgment fair.    Patient reported continued depression, anxiety, suicidal ideation, and hopelessness, difficulty sleeping at night due to both symptoms and intrusive peers, staying in bed more during the day as a result, worried that she doesn't "feel better yet." Patient did have visitors this weekend, which went well as she feels supported. Patient expressed wanting to be discharged by [facility]. She expressed a number of judgements about herself and her ability to accomplish her educational, career, and family goals. Engaged in values clarification, self-acceptance strategies, and check the facts regarding judgments. Patient was able to discuss her values in the previously mentioned domains and identify alternative thoughts. Patient also held ice throughout session. Encouraged patient to continue to practice skills. Patient also agreed to begin values worksheets.             
Patient was alert, cooperative, and in control. Oriented x3. Casually dressed, fairly groomed. Maintained good eye contact. Speech normal in production, rate, volume, and tone. No abnormal psychomotor behavior. Mood "good" with congruent. Thought process logical, goal-directed. Thought content relevant to discussion. Denied auditory/visual hallucinations and current suicidal/homicidal ideation, intent, plan, and self-harm urges. Impulse control intact. Insight and judgment fair.    Patient reported doing well, expressed motivation for discharge, believes she has benefited from hospitalization, and feels prepared to manage outpatient stressors. Session focused on safety planning. Patient was able to identify appropriate items for each section. Please see Patient Safety Plan for further details. 
Patient was alert, cooperative, and in control. Oriented x3. Casually dressed, fairly groomed. Maintained good eye contact. Speech normal in production, rate, volume, and tone. No abnormal psychomotor behavior. Mood "a little better, I slept" with congruent. Thought process logical, goal-directed. Thought content relevant to discussion. Denied auditory/visual hallucinations and current suicidal/homicidal ideation, intent, plan, and self-harm urges. Impulse control intact. Insight and judgment fair.    Patient reported some improvement in mood and denied suicidality, attributed this to feeling rested, feeling supported by her parents, and thinking about what would be most effective for her in the long term regarding school and career goals. Patient discussed a difficult interpersonal situation with a peer in which she felt annoyed but was able to tolerate the moment despite discomfort. Session focused on continued discussion about values and acceptance of unpleasant experiences and consequences of avoidance. Engaged in acceptance exercise. Patient was able to relate topic to her own emotional experience and how it contributes to her behavior. Patient also discussed opposite action being effective. Encouraged patient to continue to practice skills and be mindful of values.
Patient was alert, cooperative, and in control. Oriented x3. Casually dressed, fairly groomed. Maintained good eye contact. Speech normal in production, rate, volume, and tone. No abnormal psychomotor behavior. Mood "depressed, anxious, hopeless" with congruent, tearful affect. Thought process logical, goal-directed. Thought content relevant to discussion. Expressed passive suicidal ideation ("I want to go to sleep and never wake up"), denied current intent, plan, and self-harm urges. Committed to remaining safe on the unit and informing staff if unable to manage behaviors. Denied auditory/visual hallucinations and homicidal ideation, intent, and plan. Impulse control intact. Insight and judgment fair.    Patient is known to this clinician from previous admission. Since discharge, patient reported doing well for a few days, practicing skills, and focusing on goals and values. However, anxiety began to increase and she found skills to be less effective. Patient also stated that she attempted to drive to school to visit friends and only drove a few blocks as she felt increasing anxious and did not believe she would successfully make the trip. Over the past two or three days, anxiety continued to increase, contributing to suicidal ideation with plans to overdose on medications as well as urges to self-harm. Patient has been questioning her future, her ability to return to school, and possible need to take some time off before returning or letting go of her current major and career path. Patient felt increasing hopeless, stating "I can't live like this anymore" and informed therapist, who suggested patient seek inpatient hospitalization. Patient denied engaging in suicide attempts or self-injurious behaviors, although she does dig her nails into skin. Patient expressed wanting to work on reinforcing skills, tolerating her emotions, and clarifying her goals and values. Reviewed DBT psychoeducation. Also encouraged patient to seek support from staff, participate in unit activities, and practice skills.
Patient was alert, cooperative, and in control. Oriented x3. Casually dressed, fairly groomed. Maintained good eye contact. Speech normal in production, rate, volume, and tone. No abnormal psychomotor behavior. Mood "depressed, anxious, hopeless" with congruent, tearful affect. Thought process logical, goal-directed. Thought content relevant to discussion. Expressed suicidal ideation ("I want to die if I have to feel this way"), denied current intent, plan, and self-harm urges. Committed to remaining safe on the unit and informing staff if unable to manage behaviors. Denied auditory/visual hallucinations and homicidal ideation, intent, and plan. Impulse control intact. Insight and judgment fair.    Patient reported continued depression, anxiety, suicidal ideation, and hopelessness. Patient had a difficult visit with her family last night in which she felt guilty for her current symptoms and suicidal ideation and their impact on her family. Patient expressed continued rumination, "catastrophizing" about the future, believing she will continue to feel this way and, if symptoms do improve, they will eventually come back. Engaged in grounding exercise in session. Also engaged in acceptance exercise. Patient expressed that exercises were initially helpful, although she continued to be distracted by thoughts. Patient remained tearful throughout and asked to seek nursing staff for prn. Encouraged patient to continue to practice skills and seek support from staff when needed.
Patient was alert, cooperative, and in control. Oriented x3. Casually dressed, fairly groomed. Maintained good eye contact. Speech normal in production, rate, volume, and tone. No abnormal psychomotor behavior. Mood "good" with congruent. Thought process logical, goal-directed. Thought content relevant to discussion. Denied auditory/visual hallucinations and current suicidal/homicidal ideation, intent, plan, and self-harm urges. Impulse control intact. Insight and judgment fair.    Patient reported improvement in mood, exhibited bright affect, denied suicidality, depression, and anxiety symptoms. Patient stated that she is unsure of the specific reasons for the improvement but reports benefiting from hospitalization. Patient feels less trapped and stuck with her symptoms and also reported feeling physically better. Discussed radical acceptance and the importance of continued emotion regulation skills, particularly PLEASE skill as patient has identified strong connection between her physical and mental health. Encouraged patient to continue to practice skills and identify values.

## 2022-04-11 NOTE — BH PSYCHOLOGY - GROUP THERAPY NOTE - NSPSYCHOLGRPDBTPT_PSY_A_CORE FT
DBT Group is a group in which patients learn skills to better manage their emotions and behaviors. Group begins with a mindfulness practice so that patients have an opportunity to practice observing their internal and external experiences.  Following the mindfulness exercise the group learns new skills in a didactic format. Group today focused on the “emotion regulation” module.  Specifically, patients learned the skills of “PLEASE,” or taking care of the mind by taking care of the body. This skill involves maintaining consistent treatment for physical illness, balanced eating, avoidance of mood-altering substances, balanced sleep, and exercise.  provided examples and suggestions of ways to improve these areas, and patients were encouraged to engage in discussion of ways they can prioritize and implement this skill.
DBT Group is a group in which patients learn skills to better manage their emotions and behaviors. Group begins with a mindfulness practice so that patients have an opportunity to practice observing their internal and external experiences.  Following the mindfulness exercise the group learns new skills in a didactic format. Group today focused on the “distress tolerance” module, which are skills to help patients manage their crisis urges.  Specifically, pts learned the skill of “radical acceptance,” which includes accepting painful situations in their lives they cannot change through turning the mind and practicing willingness. Patients were asked to determine difficult situations in their lives they needed to work on accepting, and provided examples of ways to practice this while in the hospital. 
DBT Group is a group in which patients learn skills to better manage their emotions and behaviors. Group begins with a mindfulness practice so that patients have an opportunity to practice observing their internal and external experiences.  Following the mindfulness exercise the group learns new skills in a didactic format. Group today focused on the “distress tolerance” module.  Specifically, patients learned the skills of “STOP,” which teaches patients to pause and think before acting on emotions, and “pros and cons,” which is a way to objectively look at impulsive and destructive behaviors and see the short term and long term consequences of them. Patients were guided through an example of pros and cons provided by the writer as well as scenarios where the STOP skill is relevant, and were encouraged to complete their own pros and cons and STOP skills for homework.  
DBT skills generalization group is a group in which patients review the skill taught the day before, and patients have the opportunity to troubleshoot the skill, engage in more practice, and share their experience using the skill. Today’s skills review group focused on the skill IMPROVE the Moment, which is a distress tolerance skill that helps individuals tolerate intense emotions without acting on ineffective urges. The skill includes Imagery, finding Meaning in difficulty, Relaxation and self-Encouragement, among other components.  encouraged patients to share examples of how they tried using the skill, and leader as well as fellow participants provided helpful feedback and support.  
DBT Group is a group in which patients learn skills to better manage their emotions and behaviors. Group begins with a mindfulness practice so that patients have an opportunity to practice observing their internal and external experiences. Following the mindfulness exercise the group learns new skills in a didactic format. Group today focused on the “emotion regulation” module. Specifically, patients learned “accumulating positives,” which focused on ways to build up positive experiences in the short term to balance out negative experiences in their lives. Patients were given a pleasant activities list for ideas of positive activities they can incorporate into their everyday lives. Patients were asked to commit to pleasant activities they would engage in on the unit today to improve their moods.

## 2022-04-11 NOTE — BH PSYCHOLOGY - GROUP THERAPY NOTE - TOKEN PULL-DIAGNOSIS
Primary Diagnosis:  Major depressive disorder [F32.9]        Problem Dx:   Insomnia [G47.00]      Hyperlipidemia [E78.5]      Generalized anxiety disorder [F41.1]      Major depressive disorder [F32.9]      

## 2022-04-11 NOTE — BH PSYCHOLOGY - GROUP THERAPY NOTE - NSPSYCHOLGRPDBTPROB_PSY_A_CORE
depressed mood/suicidal ideation
depressed mood/suicidal ideation
anxiety/depressed mood/suicidal ideation
depressed mood/suicidal ideation
anxiety/depressed mood/suicidal ideation

## 2022-04-11 NOTE — BH INPATIENT PSYCHIATRY DISCHARGE NOTE - NSDCMRMEDTOKEN_GEN_ALL_CORE_FT
atorvastatin 10 mg oral tablet: 1 tab(s) orally once a day (at bedtime)  Cymbalta 30 mg oral delayed release capsule: 3 cap(s) orally once a day  freetext medication     -: 1 tab(s) orally once a day (at bedtime)  traZODone 50 mg oral tablet: 0.5 tab(s) orally once a day (at bedtime)

## 2022-04-11 NOTE — BH PSYCHOLOGY - CLINICIAN PSYCHOTHERAPY NOTE - NSBHPSYCHOLASSESSPROV_PSY_A_CORE
Licensed Staff Psychologist
Licensed Psychologist
Licensed Psychologist

## 2022-04-11 NOTE — BH PSYCHOLOGY - CLINICIAN PSYCHOTHERAPY NOTE - NSBHPSYCHOLINT_PSY_A_CORE
Dialectical  Behavioral Therapy (DBT)

## 2022-04-12 ENCOUNTER — OUTPATIENT (OUTPATIENT)
Dept: OUTPATIENT SERVICES | Facility: HOSPITAL | Age: 22
LOS: 1 days | Discharge: NURSING, REHAB OR LONG-TERM CARE | End: 2022-04-12
Payer: COMMERCIAL

## 2022-04-12 VITALS — TEMPERATURE: 97 F

## 2022-04-12 DIAGNOSIS — F41.1 GENERALIZED ANXIETY DISORDER: ICD-10-CM

## 2022-04-12 DIAGNOSIS — F33.9 MAJOR DEPRESSIVE DISORDER, RECURRENT, UNSPECIFIED: ICD-10-CM

## 2022-04-12 PROCEDURE — 99238 HOSP IP/OBS DSCHRG MGMT 30/<: CPT

## 2022-04-12 RX ADMIN — DULOXETINE HYDROCHLORIDE 90 MILLIGRAM(S): 30 CAPSULE, DELAYED RELEASE ORAL at 09:05

## 2022-04-12 NOTE — BH INPATIENT PSYCHIATRY PROGRESS NOTE - NSBHINPTBILLING_PSY_ALL_CORE
64104 - Inpatient Low Complexity
13263 - Inpatient Low Complexity
45373 - Inpatient Low Complexity
86057 - Inpatient Low Complexity
86525 - Inpatient Low Complexity
30398 - Inpatient Low Complexity
26977 - Inpatient Low Complexity
21997 - Inpatient Moderate Complexity
29398 - Inpatient Low Complexity
55707 - Inpatient Low Complexity

## 2022-04-12 NOTE — BH INPATIENT PSYCHIATRY PROGRESS NOTE - NSTXDEPRESGOAL_PSY_ALL_CORE
Will identify 2 coping skills that assist in improving mood
Report using a coping skill to overcome sadness and worry in order to socialize with peers daily
Will identify 2 coping skills that assist in improving mood
Report using a coping skill to overcome sadness and worry in order to socialize with peers daily

## 2022-04-12 NOTE — BH INPATIENT PSYCHIATRY PROGRESS NOTE - PRN MEDS
MEDICATIONS  (PRN):  hydrOXYzine hydrochloride 50 milliGRAM(s) Oral every 6 hours PRN Anxiety  LORazepam     Tablet 2 milliGRAM(s) Oral every 6 hours PRN anxiety or agitation  LORazepam   Injectable 2 milliGRAM(s) IntraMuscular Once PRN severe anxiety or agitation  
MEDICATIONS  (PRN):  acetaminophen     Tablet .. 650 milliGRAM(s) Oral every 6 hours PRN Mild Pain (1 - 3)  calcium carbonate    500 mG (Tums) Chewable 1 Tablet(s) Chew two times a day PRN Dyspepsia  hydrOXYzine hydrochloride 50 milliGRAM(s) Oral every 6 hours PRN Anxiety  LORazepam     Tablet 1 milliGRAM(s) Oral every 6 hours PRN Anxiety  LORazepam   Injectable 2 milliGRAM(s) IntraMuscular Once PRN severe anxiety or agitation  
MEDICATIONS  (PRN):  acetaminophen     Tablet .. 650 milliGRAM(s) Oral every 6 hours PRN Mild Pain (1 - 3)  calcium carbonate    500 mG (Tums) Chewable 1 Tablet(s) Chew two times a day PRN Dyspepsia  hydrOXYzine hydrochloride 50 milliGRAM(s) Oral every 6 hours PRN Anxiety  LORazepam     Tablet 1 milliGRAM(s) Oral every 6 hours PRN Anxiety  LORazepam   Injectable 2 milliGRAM(s) IntraMuscular Once PRN severe anxiety or agitation  
MEDICATIONS  (PRN):  acetaminophen     Tablet .. 650 milliGRAM(s) Oral every 6 hours PRN Mild Pain (1 - 3)  calcium carbonate    500 mG (Tums) Chewable 1 Tablet(s) Chew two times a day PRN Dyspepsia  hydrOXYzine hydrochloride 50 milliGRAM(s) Oral every 6 hours PRN Anxiety  ibuprofen  Tablet. 400 milliGRAM(s) Oral three times a day PRN Mild Pain (1 - 3)  LORazepam     Tablet 1 milliGRAM(s) Oral every 6 hours PRN Anxiety  LORazepam   Injectable 2 milliGRAM(s) IntraMuscular Once PRN severe anxiety or agitation  
MEDICATIONS  (PRN):  hydrOXYzine hydrochloride 50 milliGRAM(s) Oral every 6 hours PRN Anxiety  LORazepam     Tablet 2 milliGRAM(s) Oral every 6 hours PRN anxiety or agitation  LORazepam   Injectable 2 milliGRAM(s) IntraMuscular Once PRN severe anxiety or agitation  
MEDICATIONS  (PRN):  hydrOXYzine hydrochloride 50 milliGRAM(s) Oral every 6 hours PRN Anxiety  LORazepam     Tablet 2 milliGRAM(s) Oral every 6 hours PRN anxiety or agitation  LORazepam   Injectable 2 milliGRAM(s) IntraMuscular Once PRN severe anxiety or agitation  
MEDICATIONS  (PRN):  acetaminophen     Tablet .. 650 milliGRAM(s) Oral every 6 hours PRN Mild Pain (1 - 3)  calcium carbonate    500 mG (Tums) Chewable 1 Tablet(s) Chew two times a day PRN Dyspepsia  hydrOXYzine hydrochloride 50 milliGRAM(s) Oral every 6 hours PRN Anxiety  ibuprofen  Tablet. 400 milliGRAM(s) Oral three times a day PRN Mild Pain (1 - 3)  LORazepam     Tablet 1 milliGRAM(s) Oral every 6 hours PRN Anxiety  LORazepam   Injectable 2 milliGRAM(s) IntraMuscular Once PRN severe anxiety or agitation  
MEDICATIONS  (PRN):  acetaminophen     Tablet .. 650 milliGRAM(s) Oral every 6 hours PRN Mild Pain (1 - 3)  calcium carbonate    500 mG (Tums) Chewable 1 Tablet(s) Chew two times a day PRN Dyspepsia  hydrOXYzine hydrochloride 50 milliGRAM(s) Oral every 6 hours PRN Anxiety  ibuprofen  Tablet. 400 milliGRAM(s) Oral three times a day PRN Mild Pain (1 - 3)  LORazepam     Tablet 1 milliGRAM(s) Oral every 6 hours PRN Anxiety  LORazepam   Injectable 2 milliGRAM(s) IntraMuscular Once PRN severe anxiety or agitation

## 2022-04-12 NOTE — BH INPATIENT PSYCHIATRY PROGRESS NOTE - NSTXANXGOAL_PSY_ALL_CORE
Identify and practice 3 coping skills to manage anxiety
Identify and practice 3 coping skills to manage anxiety
Report that he/she was able to initiate conversations with peers 3 times daily despite panic attacks
Identify and practice 3 coping skills to manage anxiety

## 2022-04-12 NOTE — BH DISCHARGE NOTE NURSING/SOCIAL WORK/PSYCH REHAB - DISCHARGE INSTRUCTIONS AFTERCARE APPOINTMENTS
In order to check the location, date, or time of your aftercare appointment, please refer to your Discharge Instructions Document given to you upon leaving the hospital.  If you have lost the instructions please call [phone]

## 2022-04-12 NOTE — BH INPATIENT PSYCHIATRY PROGRESS NOTE - CURRENT MEDICATION
MEDICATIONS  (STANDING):  atorvastatin 10 milliGRAM(s) Oral at bedtime  clonazePAM  Tablet 0.5 milliGRAM(s) Oral at bedtime  DULoxetine 80 milliGRAM(s) Oral daily  loratadine 10 milliGRAM(s) Oral at bedtime  melatonin 5 milliGRAM(s) Oral at bedtime  traZODone 25 milliGRAM(s) Oral at bedtime  Vestura 1 Tablet(s) 1 Tablet(s) Oral at bedtime    MEDICATIONS  (PRN):  hydrOXYzine hydrochloride 50 milliGRAM(s) Oral every 6 hours PRN Anxiety  LORazepam     Tablet 2 milliGRAM(s) Oral every 6 hours PRN anxiety or agitation  LORazepam   Injectable 2 milliGRAM(s) IntraMuscular Once PRN severe anxiety or agitation  
MEDICATIONS  (STANDING):  atorvastatin 10 milliGRAM(s) Oral at bedtime  DULoxetine 90 milliGRAM(s) Oral daily  loratadine 10 milliGRAM(s) Oral at bedtime  traZODone 25 milliGRAM(s) Oral at bedtime  Vestura 1 Tablet(s) 1 Tablet(s) Oral at bedtime    MEDICATIONS  (PRN):  acetaminophen     Tablet .. 650 milliGRAM(s) Oral every 6 hours PRN Mild Pain (1 - 3)  calcium carbonate    500 mG (Tums) Chewable 1 Tablet(s) Chew two times a day PRN Dyspepsia  hydrOXYzine hydrochloride 50 milliGRAM(s) Oral every 6 hours PRN Anxiety  LORazepam     Tablet 1 milliGRAM(s) Oral every 6 hours PRN Anxiety  LORazepam   Injectable 2 milliGRAM(s) IntraMuscular Once PRN severe anxiety or agitation  
MEDICATIONS  (STANDING):  atorvastatin 10 milliGRAM(s) Oral at bedtime  clonazePAM  Tablet 0.5 milliGRAM(s) Oral at bedtime  DULoxetine 80 milliGRAM(s) Oral daily  loratadine 10 milliGRAM(s) Oral at bedtime  melatonin 5 milliGRAM(s) Oral at bedtime  traZODone 25 milliGRAM(s) Oral at bedtime  Vestura 1 Tablet(s) 1 Tablet(s) Oral at bedtime    MEDICATIONS  (PRN):  hydrOXYzine hydrochloride 50 milliGRAM(s) Oral every 6 hours PRN Anxiety  LORazepam     Tablet 2 milliGRAM(s) Oral every 6 hours PRN anxiety or agitation  LORazepam   Injectable 2 milliGRAM(s) IntraMuscular Once PRN severe anxiety or agitation  
MEDICATIONS  (STANDING):  atorvastatin 10 milliGRAM(s) Oral at bedtime  DULoxetine 90 milliGRAM(s) Oral daily  loratadine 10 milliGRAM(s) Oral at bedtime  traZODone 25 milliGRAM(s) Oral at bedtime  Vestura 1 Tablet(s) 1 Tablet(s) Oral at bedtime    MEDICATIONS  (PRN):  acetaminophen     Tablet .. 650 milliGRAM(s) Oral every 6 hours PRN Mild Pain (1 - 3)  calcium carbonate    500 mG (Tums) Chewable 1 Tablet(s) Chew two times a day PRN Dyspepsia  hydrOXYzine hydrochloride 50 milliGRAM(s) Oral every 6 hours PRN Anxiety  ibuprofen  Tablet. 400 milliGRAM(s) Oral three times a day PRN Mild Pain (1 - 3)  LORazepam     Tablet 1 milliGRAM(s) Oral every 6 hours PRN Anxiety  LORazepam   Injectable 2 milliGRAM(s) IntraMuscular Once PRN severe anxiety or agitation  
MEDICATIONS  (STANDING):  atorvastatin 10 milliGRAM(s) Oral at bedtime  clonazePAM  Tablet 0.5 milliGRAM(s) Oral at bedtime  DULoxetine 80 milliGRAM(s) Oral daily  loratadine 10 milliGRAM(s) Oral at bedtime  melatonin 5 milliGRAM(s) Oral at bedtime  traZODone 25 milliGRAM(s) Oral at bedtime  Vestura 1 Tablet(s) 1 Tablet(s) Oral at bedtime    MEDICATIONS  (PRN):  acetaminophen     Tablet .. 650 milliGRAM(s) Oral every 6 hours PRN Mild Pain (1 - 3)  calcium carbonate    500 mG (Tums) Chewable 1 Tablet(s) Chew two times a day PRN Dyspepsia  hydrOXYzine hydrochloride 50 milliGRAM(s) Oral every 6 hours PRN Anxiety  LORazepam     Tablet 1 milliGRAM(s) Oral every 6 hours PRN Anxiety  LORazepam   Injectable 2 milliGRAM(s) IntraMuscular Once PRN severe anxiety or agitation

## 2022-04-12 NOTE — BH INPATIENT PSYCHIATRY PROGRESS NOTE - NSCGIIMPROVESX_PSY_ALL_CORE
2 = Much improved - notably better with signficant reduction of symptoms; increase in the level of functioning but some symptoms remain
4 = No change - symptoms remain essentially unchanged
2 = Much improved - notably better with signficant reduction of symptoms; increase in the level of functioning but some symptoms remain
4 = No change - symptoms remain essentially unchanged
3 = Minimally improved - slightly better with little or no clinically meaningful reduction of symptoms.  Represents very little change in basic clinical status, level of care, or functional capacity.
2 = Much improved - notably better with signficant reduction of symptoms; increase in the level of functioning but some symptoms remain
2 = Much improved - notably better with signficant reduction of symptoms; increase in the level of functioning but some symptoms remain

## 2022-04-12 NOTE — BH INPATIENT PSYCHIATRY PROGRESS NOTE - NSTXSUICIDDATETRGT_PSY_ALL_CORE
14-Apr-2022
14-Apr-2022
06-Apr-2022
14-Apr-2022
06-Apr-2022
14-Apr-2022
06-Apr-2022

## 2022-04-12 NOTE — BH INPATIENT PSYCHIATRY PROGRESS NOTE - NSBHCHARTREVIEWVS_PSY_A_CORE FT
Vital Signs Last 24 Hrs  T(C): 36.6 (04-06-22 @ 08:03), Max: 36.6 (04-06-22 @ 08:03)  T(F): 97.9 (04-06-22 @ 08:03), Max: 97.9 (04-06-22 @ 08:03)  HR: --  BP: --  BP(mean): --  RR: --  SpO2: --    Orthostatic VS  04-06-22 @ 08:03  Lying BP: --/-- HR: --  Sitting BP: 128/71 HR: 103  Standing BP: --/-- HR: --  Site: --  Mode: --  
Vital Signs Last 24 Hrs  T(C): 36.8 (04-11-22 @ 07:57), Max: 36.9 (04-10-22 @ 20:28)  T(F): 98.2 (04-11-22 @ 07:57), Max: 98.5 (04-10-22 @ 20:28)  HR: 95 (04-11-22 @ 07:57) (95 - 95)  BP: 113/69 (04-11-22 @ 07:57) (113/69 - 113/69)  BP(mean): --  RR: --  SpO2: --    Orthostatic VS  04-10-22 @ 07:54  Lying BP: --/-- HR: --  Sitting BP: 107/68 HR: 89  Standing BP: --/-- HR: --  Site: --  Mode: --  
Vital Signs Last 24 Hrs  T(C): 36.7 (04-02-22 @ 07:55), Max: 36.9 (04-01-22 @ 21:15)  T(F): 98 (04-02-22 @ 07:55), Max: 98.4 (04-01-22 @ 21:15)  HR: 100 (04-02-22 @ 07:55) (100 - 100)  BP: 127/72 (04-02-22 @ 07:55) (127/72 - 127/72)  BP(mean): --  RR: --  SpO2: --    Orthostatic VS  04-01-22 @ 08:15  Lying BP: --/-- HR: --  Sitting BP: 129/76 HR: 87  Standing BP: --/-- HR: --  Site: --  Mode: --  
Vital Signs Last 24 Hrs  T(C): 36.9 (04-03-22 @ 09:27), Max: 37.1 (04-02-22 @ 21:20)  T(F): 98.4 (04-03-22 @ 09:27), Max: 98.8 (04-02-22 @ 21:20)  HR: 100 (04-03-22 @ 09:27) (100 - 100)  BP: 131/74 (04-03-22 @ 09:27) (131/74 - 131/74)  BP(mean): --  RR: --  SpO2: --    
Vital Signs Last 24 Hrs  T(C): 36.2 (04-05-22 @ 06:32), Max: 36.6 (04-04-22 @ 20:21)  T(F): 97.1 (04-05-22 @ 06:32), Max: 97.9 (04-04-22 @ 20:21)  HR: 95 (04-05-22 @ 06:32) (95 - 95)  BP: 118/71 (04-05-22 @ 06:32) (118/71 - 118/71)  BP(mean): --  RR: --  SpO2: --    
Vital Signs Last 24 Hrs  T(C): 36.1 (04-07-22 @ 07:57), Max: 36.8 (04-06-22 @ 20:45)  T(F): 96.9 (04-07-22 @ 07:57), Max: 98.2 (04-06-22 @ 20:45)  HR: --  BP: 98/77 (04-07-22 @ 07:57) (98/77 - 98/77)  BP(mean): 102 (04-07-22 @ 07:57) (102 - 102)  RR: --  SpO2: --    Orthostatic VS  04-06-22 @ 08:03  Lying BP: --/-- HR: --  Sitting BP: 128/71 HR: 103  Standing BP: --/-- HR: --  Site: --  Mode: --  
Vital Signs Last 24 Hrs  T(C): 37.1 (04-01-22 @ 08:15), Max: 37.1 (04-01-22 @ 08:15)  T(F): 98.8 (04-01-22 @ 08:15), Max: 98.8 (04-01-22 @ 08:15)  HR: --  BP: --  BP(mean): --  RR: --  SpO2: --    Orthostatic VS  04-01-22 @ 08:15  Lying BP: --/-- HR: --  Sitting BP: 129/76 HR: 87  Standing BP: --/-- HR: --  Site: --  Mode: --  Orthostatic VS  03-30-22 @ 19:17  Lying BP: --/-- HR: --  Sitting BP: 120/77 HR: 91  Standing BP: 119/80 HR: 111  Site: --  Mode: --  
Vital Signs Last 24 Hrs  T(C): 36.2 (04-12-22 @ 07:36), Max: 36.4 (04-11-22 @ 20:42)  T(F): 97.2 (04-12-22 @ 07:36), Max: 97.5 (04-11-22 @ 20:42)  HR: --  BP: --  BP(mean): --  RR: --  SpO2: --    Orthostatic VS  04-12-22 @ 07:36  Lying BP: --/-- HR: --  Sitting BP: 124/67 HR: 111  Standing BP: --/-- HR: --  Site: --  Mode: --  
Vital Signs Last 24 Hrs  T(C): 36.7 (04-07-22 @ 20:31), Max: 36.7 (04-07-22 @ 20:31)  T(F): 98 (04-07-22 @ 20:31), Max: 98 (04-07-22 @ 20:31)  HR: --  BP: 119/87 (04-08-22 @ 07:48) (119/87 - 119/87)  BP(mean): 84 (04-08-22 @ 07:48) (84 - 84)  RR: 16 (04-08-22 @ 07:48) (16 - 16)  SpO2: --    
Vital Signs Last 24 Hrs  T(C): 36.8 (04-04-22 @ 08:05), Max: 36.8 (04-03-22 @ 20:55)  T(F): 98.3 (04-04-22 @ 08:05), Max: 98.3 (04-04-22 @ 08:05)  HR: 105 (04-04-22 @ 08:05) (105 - 105)  BP: 128/83 (04-04-22 @ 08:05) (128/83 - 128/83)  BP(mean): --  RR: --  SpO2: --

## 2022-04-12 NOTE — BH INPATIENT PSYCHIATRY PROGRESS NOTE - NSTXSUICIDGOAL_PSY_ALL_CORE
Will verbalize a decrease in preoccupation with suicidal thoughts and / or intent to commit suicide to 2 on a 10-point scale
Be able to read an index card of soothing self-statements when having a suicidal thought to stay safe
Will verbalize a decrease in preoccupation with suicidal thoughts and / or intent to commit suicide to 2 on a 10-point scale
Be able to read an index card of soothing self-statements when having a suicidal thought to stay safe

## 2022-04-12 NOTE — BH DISCHARGE NOTE NURSING/SOCIAL WORK/PSYCH REHAB - PATIENT PORTAL LINK FT
You can access the FollowMyHealth Patient Portal offered by [facility] by registering at the following website: [email or web address]. By joining [employer]’s FollowMyHealth portal, you will also be able to view your health information using other applications (apps) compatible with our system.

## 2022-04-12 NOTE — BH DISCHARGE NOTE NURSING/SOCIAL WORK/PSYCH REHAB - NSDCPRRECOMMEND_PSY_ALL_CORE
Pt would benefit from following with Partial program for symptom management and continue to use coping skills as discussed.

## 2022-04-12 NOTE — BH INPATIENT PSYCHIATRY PROGRESS NOTE - NSICDXBHSECONDARYDX_PSY_ALL_CORE
Generalized anxiety disorder   F41.1  Hyperlipidemia   E78.5  Insomnia   G47.00  

## 2022-04-12 NOTE — BH INPATIENT PSYCHIATRY PROGRESS NOTE - NSBHMETABOLIC_PSY_ALL_CORE_FT
BMI: BMI (kg/m2): 27.8 (03-30-22 @ 19:17)  HbA1c: A1C with Estimated Average Glucose Result: 4.7 % (03-01-22 @ 10:31)    Glucose:   BP: 113/69 (04-11-22 @ 07:57) (113/69 - 114/68)  Lipid Panel: Date/Time: 03-01-22 @ 10:31  Cholesterol, Serum: 164  Direct LDL: --  HDL Cholesterol, Serum: 61  Total Cholesterol/HDL Ration Measurement: --  Triglycerides, Serum: 110  
BMI: BMI (kg/m2): 27.8 (03-30-22 @ 19:17)  HbA1c: A1C with Estimated Average Glucose Result: 4.7 % (03-01-22 @ 10:31)    Glucose:   BP: 113/69 (04-11-22 @ 07:57) (113/69 - 113/69)  Lipid Panel: Date/Time: 03-01-22 @ 10:31  Cholesterol, Serum: 164  Direct LDL: --  HDL Cholesterol, Serum: 61  Total Cholesterol/HDL Ration Measurement: --  Triglycerides, Serum: 110  
BMI: BMI (kg/m2): 27.8 (03-30-22 @ 19:17)  HbA1c: A1C with Estimated Average Glucose Result: 4.7 % (03-01-22 @ 10:31)    Glucose:   BP: 131/71 (03-31-22 @ 07:57) (121/78 - 141/92)  Lipid Panel: Date/Time: 03-01-22 @ 10:31  Cholesterol, Serum: 164  Direct LDL: --  HDL Cholesterol, Serum: 61  Total Cholesterol/HDL Ration Measurement: --  Triglycerides, Serum: 110  
BMI: BMI (kg/m2): 27.8 (03-30-22 @ 19:17)  HbA1c: A1C with Estimated Average Glucose Result: 4.7 % (03-01-22 @ 10:31)    Glucose:   BP: 128/83 (04-04-22 @ 08:05) (127/72 - 131/74)  Lipid Panel: Date/Time: 03-01-22 @ 10:31  Cholesterol, Serum: 164  Direct LDL: --  HDL Cholesterol, Serum: 61  Total Cholesterol/HDL Ration Measurement: --  Triglycerides, Serum: 110  
BMI: BMI (kg/m2): 27.8 (03-30-22 @ 19:17)  HbA1c: A1C with Estimated Average Glucose Result: 4.7 % (03-01-22 @ 10:31)    Glucose:   BP: 118/71 (04-05-22 @ 06:32) (118/71 - 128/83)  Lipid Panel: Date/Time: 03-01-22 @ 10:31  Cholesterol, Serum: 164  Direct LDL: --  HDL Cholesterol, Serum: 61  Total Cholesterol/HDL Ration Measurement: --  Triglycerides, Serum: 110  
BMI: BMI (kg/m2): 27.8 (03-30-22 @ 19:17)  HbA1c: A1C with Estimated Average Glucose Result: 4.7 % (03-01-22 @ 10:31)    Glucose:   BP: 131/74 (04-03-22 @ 09:27) (127/72 - 131/74)  Lipid Panel: Date/Time: 03-01-22 @ 10:31  Cholesterol, Serum: 164  Direct LDL: --  HDL Cholesterol, Serum: 61  Total Cholesterol/HDL Ration Measurement: --  Triglycerides, Serum: 110  
BMI: BMI (kg/m2): 27.8 (03-30-22 @ 19:17)  HbA1c: A1C with Estimated Average Glucose Result: 4.7 % (03-01-22 @ 10:31)    Glucose:   BP: 119/87 (04-08-22 @ 07:48) (98/77 - 119/87)  Lipid Panel: Date/Time: 03-01-22 @ 10:31  Cholesterol, Serum: 164  Direct LDL: --  HDL Cholesterol, Serum: 61  Total Cholesterol/HDL Ration Measurement: --  Triglycerides, Serum: 110  
BMI: BMI (kg/m2): 27.8 (03-30-22 @ 19:17)  HbA1c: A1C with Estimated Average Glucose Result: 4.7 % (03-01-22 @ 10:31)    Glucose:   BP: 127/72 (04-02-22 @ 07:55) (121/78 - 131/71)  Lipid Panel: Date/Time: 03-01-22 @ 10:31  Cholesterol, Serum: 164  Direct LDL: --  HDL Cholesterol, Serum: 61  Total Cholesterol/HDL Ration Measurement: --  Triglycerides, Serum: 110  
BMI: BMI (kg/m2): 27.8 (03-30-22 @ 19:17)  HbA1c: A1C with Estimated Average Glucose Result: 4.7 % (03-01-22 @ 10:31)    Glucose:   BP: 98/77 (04-07-22 @ 07:57) (98/77 - 118/71)  Lipid Panel: Date/Time: 03-01-22 @ 10:31  Cholesterol, Serum: 164  Direct LDL: --  HDL Cholesterol, Serum: 61  Total Cholesterol/HDL Ration Measurement: --  Triglycerides, Serum: 110  
BMI: BMI (kg/m2): 27.8 (03-30-22 @ 19:17)  HbA1c: A1C with Estimated Average Glucose Result: 4.7 % (03-01-22 @ 10:31)    Glucose:   BP: 118/71 (04-05-22 @ 06:32) (118/71 - 131/74)  Lipid Panel: Date/Time: 03-01-22 @ 10:31  Cholesterol, Serum: 164  Direct LDL: --  HDL Cholesterol, Serum: 61  Total Cholesterol/HDL Ration Measurement: --  Triglycerides, Serum: 110

## 2022-04-12 NOTE — BH INPATIENT PSYCHIATRY PROGRESS NOTE - NSTXDCOPLKDATETRGT_PSY_ALL_CORE
07-Apr-2022
14-Apr-2022
07-Apr-2022
14-Apr-2022

## 2022-04-12 NOTE — BH INPATIENT PSYCHIATRY PROGRESS NOTE - NSTXANXDATETRGT_PSY_ALL_CORE
07-Apr-2022
14-Apr-2022
07-Apr-2022
14-Apr-2022
14-Apr-2022
12-Apr-2022

## 2022-04-12 NOTE — BH INPATIENT PSYCHIATRY PROGRESS NOTE - NSTXANXPROGRES_PSY_ALL_CORE
Met - goal discontinued
No Change
Improving
No Change
Improving
Improving

## 2022-04-12 NOTE — BH INPATIENT PSYCHIATRY PROGRESS NOTE - NSCGISEVERILLNESS_PSY_ALL_CORE
2 = Borderline mentally ill – subtle or suspected pathology
3 = Mildly ill – clearly established symptoms with minimal, if any, distress or difficulty in social and occupational function
5 = Markedly ill - intrusive symptoms that distinctly impair social/occupational function or cause intrusive levels of distress
5 = Markedly ill - intrusive symptoms that distinctly impair social/occupational function or cause intrusive levels of distress
3 = Mildly ill – clearly established symptoms with minimal, if any, distress or difficulty in social and occupational function
4 = Moderately ill – overt symptoms causing noticeable, but modest, functional impairment or distress; symptom level may warrant medication
3 = Mildly ill – clearly established symptoms with minimal, if any, distress or difficulty in social and occupational function

## 2022-04-12 NOTE — BH INPATIENT PSYCHIATRY PROGRESS NOTE - NSDCCRITERIA_PSY_ALL_CORE
Euthymic mood and no SI

## 2022-04-12 NOTE — BH DISCHARGE NOTE NURSING/SOCIAL WORK/PSYCH REHAB - NSDCPRGOAL_PSY_ALL_CORE
During the current hospitalization, patient has been addressing psychiatric rehabilitation goals pertaining to identifying coping skills that assist decreasing anxiety, guilt and shame. Patient has demonstrated progress towards psychiatric rehabilitation goals during the current hospitalization. Patient exhibited progress through participating in individual and group therapy and developing additional coping skills to assist with improving her mood and decreasing hopelessness. Pt was able to identify warning signs to prevent relapse. Pt has been managing negative emotions with radical acceptance, emotional regulation, positive distraction and self soothing skills. Pt utilized coping skills such as check the facts, problem solving, Stop, Pros and Cons, Values for self,  mindfulness, radical acceptance, grounding, DBT TIPP, ART/coloring, journaling, self- soothing. Writer encouraged patient to continue to strengthen and practice effective skills. Patient met goal of identifying coping skills as evidenced by reporting these skills have helped her during current hospitalization. Patient was compliant with medication during current hospitalization. Patient was provided with a Press Ganey survey prior to discharge. Pt identified her career as protective factor.

## 2022-04-12 NOTE — BH INPATIENT PSYCHIATRY PROGRESS NOTE - NSTXDCOPLKDATEEST_PSY_ALL_CORE
31-Mar-2022
07-Apr-2022
31-Mar-2022
31-Mar-2022
07-Apr-2022
31-Mar-2022
07-Apr-2022
07-Apr-2022

## 2022-04-12 NOTE — BH INPATIENT PSYCHIATRY PROGRESS NOTE - MSE OPTIONS
Structured MSE
Unstructured MSE
Structured MSE
Unstructured MSE
Unstructured MSE
Structured MSE

## 2022-04-12 NOTE — BH DISCHARGE NOTE NURSING/SOCIAL WORK/PSYCH REHAB - NSDCADDINFO1FT_PSY_ALL_CORE
Please be sure to answer a private call for registration. This call will come before your scheduled appointment time. You will be sent a Zoom ID by email, if not received please know your doctor will call you at 11:05AM to receive Zoom Instructions.

## 2022-04-12 NOTE — BH INPATIENT PSYCHIATRY PROGRESS NOTE - NSICDXBHPRIMARYDX_PSY_ALL_CORE
Major depressive disorder   F32.9  

## 2022-04-12 NOTE — BH DISCHARGE NOTE NURSING/SOCIAL WORK/PSYCH REHAB - NSCDUDCCRISIS_PSY_A_CORE
[facility] Well  [phone] [facility]-WELL [phone])  Text "WELL" to [phone]  Website: www.[employer]/.[employer] 1 (521) 951-NVFO (9336) Website: www.safehorizon.org/.National Suicide Prevention Lifeline 0 (969) 612-4635/.  [employer]  1 (687) LIFENET (114-8051)/.  [employer]  [phone] [place] [id number] [phone]   Hours:  Monday through Friday from 9 AM to 3 PM/.  [employer] [facility] Crisis [facility]  [phone], Option 1

## 2022-04-12 NOTE — BH INPATIENT PSYCHIATRY PROGRESS NOTE - NSTXDEPRESDATEEST_PSY_ALL_CORE
31-Mar-2022

## 2022-04-12 NOTE — BH INPATIENT PSYCHIATRY PROGRESS NOTE - NSTXSUICIDDATEEST_PSY_ALL_CORE
30-Mar-2022

## 2022-04-12 NOTE — BH INPATIENT PSYCHIATRY PROGRESS NOTE - NSBHASSESSSUMMFT_PSY_ALL_CORE
21-year-old female, single, non-caregiver residing with her parents, currently attending [employer], with PPhx anxiety, panic and depression with long hx of outpatient treatment (currently in treatment with Dr. Morataya for the past 3 years), no history of violence, no legal involvement, no self-injurious behaviors, no suicide attempts, 1 prior psychiatric hospitalization recently at [facility] 1S from 2/28-3/9/22), and no access to guns/weapons, who presents to [facility] ED BIB father on recommendation from therapist due to progressive depressive and anxious symptoms with SI.    The patient remains with severe depression and anxiety.  Passive SI without intent or plan.  Behavior well controlled.  Tolerating medications well.    -No 1:1 needed as patient is voluntary, cooperative, calm, no active SI in the hospital, happy to be getting help.  -Continue Cymbalta 80mg daily  -Continue melatonin and Trazodone for insomnia.  Continue Klonopin 0.5mg hs for insomnia and anxiety.  Higher doses of trazodone causes excessive sedation in the past.  -Continue Lipitor and claritin  -Group and individual therapy  -Consider PHP
21-year-old female, single, non-caregiver residing with her parents, currently attending [employer], with PPhx anxiety, panic and depression with long hx of outpatient treatment (currently in treatment with Dr. Morataya for the past 3 years), no history of violence, no legal involvement, no self-injurious behaviors, no suicide attempts, 1 prior psychiatric hospitalization recently at [facility] 1S from 2/28-3/9/22), and no access to guns/weapons, who presents to [facility] ED BIB father on recommendation from therapist due to progressive depressive and anxious symptoms with SI.    4/3 Patient remains but less hopeless.    Continues with passive SI without intent or plan.   PLAN:  -No 1:1 needed as patient is voluntary, cooperative, calm, no active SI in the hospital, happy to be getting help.  -Continue Cymbalta 80mg daily  -Continue melatonin and Trazodone for insomnia.  Continue Klonopin 0.5mg hs for insomnia and anxiety.  Higher doses of trazodone causes excessive sedation in the past.  -Continue Lipitor and claritin  -Group and individual therapy  -Consider PHP
21-year-old female, single, non-caregiver residing with her parents, currently attending [employer] with PPhx anxiety, panic and depression with long hx of outpatient treatment (currently in treatment with Dr. Morataya for the past 3 years), no history of violence, no legal involvement, no self-injurious behaviors, no suicide attempts, 1 prior psychiatric hospitalization recently at [facility] 1S from 2/28-3/9/22), and no access to guns/weapons, who presents to [facility] ED BIB father on recommendation from therapist due to progressive depressive and anxious symptoms with SI.    4/2 Patient remains with hopelessness and severe depression and anxiety.    Continues with passive SI without intent or plan.   PLAN:  -No 1:1 needed as patient is voluntary, cooperative, calm, no active SI in the hospital, happy to be getting help.  -Continue Cymbalta 80mg daily  -Continue melatonin and Trazodone for insomnia.  Continue Klonopin 0.5mg hs for insomnia and anxiety.  Higher doses of trazodone causes excessive sedation in the past.  -Continue Lipitor and claritin  -Group and individual therapy  -Consider PHP
Patient is a 21-year-old female with a history of MDD, FRANSISCO, and panic disorder who presents to the hospital after informing her therapist about her plans for suicidal overdose. This is in the context of a recent psychiatric hospitalization, and worsening depressive symptoms. She meets criteria for major depressive disorder with anxious distress, given her significant symptoms of anxiety during periods of depression. Given her recent suicidality and recent psychiatric hospitalization, she has a danger to result in his criteria for inpatient psychiatric hospitalization.  PsyHx: Sees Dr. Morataya x 3 years. Therapist Aida Martines 1x hospitalization 1 month ago at [facility].  SH: Undergrad at [employer]. Lives with parents.    Week of 4/5: Patient reported significant depression, anxiety, and passive SI, which has resolved with titration of Cymbalta. She and her father decline ECT at this time, in favor of PHP after discharge.     1. Admission status  9.13 (Voluntary)    2. Significant lab findings  None    3. Psychotropic medications  - Duloxetine 90 mg daily  	- Home, inc 4/1, inc 4/7  - Trazodone 25 mg QHS (insomnia)  	- Home medication  Discontinued Clonazepam 0.5 mg QHS (insomnia)  	- Start 4/1, dc 4/7  Discontinued Melatonin 5 mg QHS (insomnia)  	- Home medication, dc 4/7    4. Medical conditions, other medications, consults  A) Dyslipidemia  - Atorvastatin 10 mg QHS   B) Allergies  - Loratadine 10 mg QHS    5. Psychosocial interventions  - Family contacted: Contacted father 4/6; discussed use of ECT for depression and enrollment in PHP after discharge
Patient is a 21-year-old female with a history of MDD, FRANSISCO, and panic disorder who presents to the hospital after informing her therapist about her plans for suicidal overdose. This is in the context of a recent psychiatric hospitalization, and worsening depressive symptoms. She meets criteria for major depressive disorder with anxious distress, given her significant symptoms of anxiety during periods of depression. Given her recent suicidality and recent psychiatric hospitalization, she has a danger to result in his criteria for inpatient psychiatric hospitalization.  PsyHx: Sees Dr. Morataya x 3 years. Therapist Aida Martines 1x hospitalization 1 month ago at [facility].  SH: Undergrad at [employer]. Lives with parents.    Week of 4/5: Patient continues to report significant depression, anxiety, and passive SI. Has been using lorazepam for anxiety. Discussing potential to treat with ECT, given numerous documented antidepressant trials.    1. Admission status  9.13 (Voluntary)    2. Significant lab findings  None    3. Psychotropic medications  - Duloxetine 80 mg daily  	- Home, inc 4/1  - Trazodone 25 mg QHS (insomnia)  	- Home medication  - Clonazepam 0.5 mg QHS (insomnia)  	- Start 4/1  - Melatonin 5 mg QHS (insomnia)  	- Home medication    4. Medical conditions, other medications, consults  A) Dyslipidemia  - Atorvastatin 10 mg QHS   B) Allergies  - Loratadine 10 mg QHS    5. Psychosocial interventions  - Family contacted: Contacted father 4/6; discussed use of ECT for depression and enrollment in PHP after discharge
Patient is a 21-year-old female with a history of MDD, FRANSISCO, and panic disorder who presents to the hospital after informing her therapist about her plans for suicidal overdose. This is in the context of a recent psychiatric hospitalization, and worsening depressive symptoms. She meets criteria for major depressive disorder with anxious distress, given her significant symptoms of anxiety during periods of depression. Given her recent suicidality and recent psychiatric hospitalization, she has a danger to result in his criteria for inpatient psychiatric hospitalization.  PsyHx: Sees Dr. Morataya x 3 years. Therapist Aida Martines 1x hospitalization 1 month ago at [facility].  SH: Undergrad at [employer]. Lives with parents.    Week of 4/5: Patient reported significant depression, anxiety, and passive SI, which has resolved with titration of Cymbalta. She and her father decline ECT at this time, in favor of PHP after discharge.   Week of 4/11: Patient with depression and anxiety greatly improved since admission and adjustment of dose of Cymbalta. She and her father agree with plan for discharge tomorrow.    1. Admission status  9.13 (Voluntary)    2. Significant lab findings  None    3. Psychotropic medications  - Duloxetine 90 mg daily  	- Home, inc 4/1, inc 4/7  - Trazodone 25 mg QHS (insomnia)  	- Home medication  Discontinued Clonazepam 0.5 mg QHS (insomnia)  	- Start 4/1, dc 4/7  Discontinued Melatonin 5 mg QHS (insomnia)  	- Home medication, dc 4/7    4. Medical conditions, other medications, consults  A) Dyslipidemia  - Atorvastatin 10 mg QHS   B) Allergies  - Loratadine 10 mg QHS    5. Psychosocial interventions  - Family contacted: Contacted father 4/6; discussed use of ECT for depression and enrollment in PHP after discharge
Patient is a 21-year-old female with a history of MDD, FRANSISCO, and panic disorder who presents to the hospital after informing her therapist about her plans for suicidal overdose. This is in the context of a recent psychiatric hospitalization, and worsening depressive symptoms. She meets criteria for major depressive disorder with anxious distress, given her significant symptoms of anxiety during periods of depression. Given her recent suicidality and recent psychiatric hospitalization, she has a danger to result in his criteria for inpatient psychiatric hospitalization.  PsyHx: Sees Dr. Morataya x 3 years. Therapist Aida Martines 1x hospitalization 1 month ago at [facility].  SH: Undergrad at [employer]. Lives with parents.    Week of 4/5: Patient continues to report significant depression, anxiety, and passive SI. Has been using lorazepam for anxiety. Discussing potential to treat with ECT, given numerous documented antidepressant trials.    1. Admission status  9.13 (Voluntary)    2. Significant lab findings  None    3. Psychotropic medications  - Duloxetine 80 mg daily  	- Home, inc 4/1  - Trazodone 25 mg QHS (insomnia)  	- Home medication  - Clonazepam 0.5 mg QHS (insomnia)  	- Start 4/1  - Melatonin 5 mg QHS (insomnia)  	- Home medication    4. Medical conditions, other medications, consults  A) Dyslipidemia  - Atorvastatin 10 mg QHS   B) Allergies  - Loratadine 10 mg QHS    5. Psychosocial interventions  - Family contacted: DIOGO  
Patient is a 21-year-old female with a history of MDD, FRANSISCO, and panic disorder who presents to the hospital after informing her therapist about her plans for suicidal overdose. This is in the context of a recent psychiatric hospitalization, and worsening depressive symptoms. She meets criteria for major depressive disorder with anxious distress, given her significant symptoms of anxiety during periods of depression. Given her recent suicidality and recent psychiatric hospitalization, she has a danger to result in his criteria for inpatient psychiatric hospitalization.  PsyHx: Sees Dr. Morataya x 3 years. Therapist Aida Martines 1x hospitalization 1 month ago at [facility].  SH: Undergrad at [employer]. Lives with parents.    Week of 4/5: Patient reported significant depression, anxiety, and passive SI, which has resolved with titration of Cymbalta. She and her father decline ECT at this time, in favor of PHP after discharge.     1. Admission status  9.13 (Voluntary)    2. Significant lab findings  None    3. Psychotropic medications  - Duloxetine 90 mg daily  	- Home, inc 4/1, inc 4/7  - Trazodone 25 mg QHS (insomnia)  	- Home medication  Discontinued Clonazepam 0.5 mg QHS (insomnia)  	- Start 4/1, dc 4/7  Discontinued Melatonin 5 mg QHS (insomnia)  	- Home medication, dc 4/7    4. Medical conditions, other medications, consults  A) Dyslipidemia  - Atorvastatin 10 mg QHS   B) Allergies  - Loratadine 10 mg QHS    5. Psychosocial interventions  - Family contacted: Contacted father 4/6; discussed use of ECT for depression and enrollment in PHP after discharge
Patient is a 21-year-old female with a history of MDD, FRANSISCO, and panic disorder who presents to the hospital after informing her therapist about her plans for suicidal overdose. This is in the context of a recent psychiatric hospitalization, and worsening depressive symptoms. She meets criteria for major depressive disorder with anxious distress, given her significant symptoms of anxiety during periods of depression. Given her recent suicidality and recent psychiatric hospitalization, she has a danger to result in his criteria for inpatient psychiatric hospitalization.  PsyHx: Sees Dr. Morataya x 3 years. Therapist Aida Martines 1x hospitalization 1 month ago at [facility].  SH: Undergrad at [employer]. Lives with parents.    Week of 4/5: Patient reported significant depression, anxiety, and passive SI, which has resolved with titration of Cymbalta. She and her father decline ECT at this time, in favor of PHP after discharge.   Week of 4/11: Patient with depression and anxiety greatly improved since admission and adjustment of dose of Cymbalta. She and her father agree with plan for discharge tomorrow.    1. Admission status  9.13 (Voluntary)    2. Significant lab findings  None    3. Psychotropic medications  - Duloxetine 90 mg daily  	- Home, inc 4/1, inc 4/7  - Trazodone 25 mg QHS (insomnia)  	- Home medication  Discontinued Clonazepam 0.5 mg QHS (insomnia)  	- Start 4/1, dc 4/7  Discontinued Melatonin 5 mg QHS (insomnia)  	- Home medication, dc 4/7    4. Medical conditions, other medications, consults  A) Dyslipidemia  - Atorvastatin 10 mg QHS   B) Allergies  - Loratadine 10 mg QHS    5. Psychosocial interventions  - Family contacted: Contacted father 4/6; discussed use of ECT for depression and enrollment in PHP after discharge
Patient is a 21-year-old female with a history of MDD, FRANSISCO, and panic disorder who presents to the hospital after informing her therapist about her plans for suicidal overdose. This is in the context of a recent psychiatric hospitalization, and worsening depressive symptoms. She meets criteria for major depressive disorder with anxious distress, given her significant symptoms of anxiety during periods of depression. Given her recent suicidality and recent psychiatric hospitalization, she has a danger to result in his criteria for inpatient psychiatric hospitalization.  PsyHx: Sees Dr. Morataya x 3 years. Therapist Aida Martines 1x hospitalization 1 month ago at [facility].  SH: Undergrad at [employer]. Lives with parents.    Continues with passive SI without intent or plan.   PLAN:  -No 1:1 needed as patient is voluntary, cooperative, calm, no active SI in the hospital, happy to be getting help.  -Continue Cymbalta 80mg daily  -Continue melatonin and Trazodone for insomnia.  Continue Klonopin 0.5mg hs for insomnia and anxiety.  Higher doses of trazodone causes excessive sedation in the past.  -Continue Lipitor and claritin  -Group and individual therapy  -Consider PHP/  - Consider ECT

## 2022-04-12 NOTE — BH INPATIENT PSYCHIATRY PROGRESS NOTE - NSBHFUPINTERVALHXFT_PSY_A_CORE
Chart reviewed, including vital signs, medication administration record, lab results, and nursing notes.   Case discussed with nursing, psychology, psych rehab, and social work in team meeting.     Patient is seen in the day room, in no acute distress, amenable to interview. States that she is doing “a lot better than last time“. States that last time she felt more anxious about the plan to return home, but feels much better about this given that she will be in partial hospitalization. Reports decreased sleep and stable appetite. Denies medication side effects. Denies SI/HI/AVH. States that after leaving the hospital, she is looking forward to taking a shower.
Chart reviewed, including vital signs, medication administration record, lab results, and nursing notes.   Case discussed with nursing, psychology, psych rehab, and social work in team meeting.     Patient is seen in the group room, in no acute distress, amenable to interview. States that her mood is somewhat better today after sleeping well last night. States that her level of depression is now 7 out of 10 in severity, decreased from 10 out of 10 in severity. Denies medication side effects. Reports stable sleep and appetite. Denies SI/HI/AVH. States that she is discussing the distress tolerance strategies in her individual therapy. Continues to remain ambivalent about receiving ECT.
Chart reviewed, including vital signs, medication administration record, lab results, and nursing notes.   Case discussed with nursing, psychology, psych rehab, and social work in team meeting.     Patient is seen in the day room, in no acute distress, amenable to interview. States that she was feeling this anxious this morning, and she asked for Ativan. Reports decreased sleep and stable appetite. Reports continued suicidal ideation, with no plans to end her life in the hospital. She feels that she can be safe in the hospital. Denies auditory or visual hallucinations. She asks me to contact her father to further discuss the use of ECT. She declines partial hospitalization at this time, because she hopes to stay with her outpatient psychiatrist.
Patient seen for follow up for depression and hopelessness  Chart reviewed and case discussed with treatment team.    Patient remains tearful and dysphoric   Reports severe depression and anxiety, with hopelessness and helplessness.    Continues with passive SI, without intent or plan in the hospital. 
Patient seen for follow up for depression and hopelessness  Chart reviewed and case discussed with treatment team.    Patient remains depressed but less hopeless after family support   Reports depression and anxiety again today.    Continues with passive SI, without intent or plan in the hospital. 
Chart reviewed, including vital signs, medication administration record, lab results, and nursing notes.   Case discussed with nursing, psychology, psych rehab, and social work in team meeting.     Patient is seen in the group room, in no acute distress, amenable to interview. States that she had a boring weekend, but her parents visited her. She reports that her mood is much better, and feels more comfortable with discharge at this time, compared to last time. She attributes this to her plan to attend partial hospitalization. Reports no medication side effects. Reports stable sleep and appetite. Denies SI/HI/AVH.
Patient seen for follow up for depression, chart reviewed, and case discussed with treatment team.  No events reported overnight.  The patient continues to complain of severe depression and anxiety, with hopelessness and helplessness.  She also has passive SI, without intent or plan in the hospital.  The patient had a difficult visit with her mother last night, she states she hates having her mother see her like this.  The patient reports eating fair, and sleeping better last night.  The patient feels amotivated today, but will try to attend groups when she can.  Encouragement provided.  She has been compliant with medications, tolerating them well.
Chart reviewed, including vital signs, medication administration record, lab results, and nursing notes.   Case discussed with nursing, psychology, psych rehab, and social work in team meeting.     Patient is seen in the day room, in no acute distress, amenable to interview. She denies any issues with medication, and states that she was able to sleep well despite discontinuing Klonopin and melatonin. However, she reports feeling anxious on the unit, due to fighting between peers. She hopes to be discharged as soon as possible given the improvement in her mood. She rates her mood as “6 out of 10“ today. Reports no medication side effects. Denies SI/HI/AVH.
Chart reviewed, including vital signs, medication administration record, lab results, and nursing notes.   Case discussed with nursing, psychology, psych rehab, and social work in team meeting.     Patient is seen in the group room, in no acute distress, amenable to interview. States that after her previous hospitalization, she was “fine for a few days“, but states her depression got much worse. States that she recently had plans to visit her friends at school, who are an hour and a half away by car. However, she began to feel extremely anxious, so she went back home. She states at this commute can be very “triggering“. Reports that Cymbalta was initially effective, but she is not sure if it is helping her now. She continues to report depression that is 10 out of 10 in severity as well as significant anxiety. Reports decreased sleep and decreased appetite. States that she has been tried on previous trials of Zoloft and Lexapro. States that she has been taking Ativan in the morning, which helps with her appetite. We discussed the potential of pursuing ECT.
Chart reviewed, including vital signs, medication administration record, lab results, and nursing notes.   Case discussed with nursing, psychology, psych rehab, and social work in team meeting.     Patient is seen in the group room, in no acute distress, amenable to interview. States that she declines the use of ECT for now. Reports that her mood is better today. Reports no medication side effects. Reports stable sleep and appetite. Denies SI/HI/AVH. We discussed the plan to discontinue Klonopin and melatonin given her improvement in sleep, and also increase the dose of Cymbalta today. She hopes to be discharged soon to attend partial hospitalization.

## 2022-04-12 NOTE — BH INPATIENT PSYCHIATRY PROGRESS NOTE - NSBHFUPINTERVALCCFT_PSY_A_CORE
"Really depressed"
"I am still depressed but seeing my family yesterday helped"
"I feel like I can't go on with this degree of depression"
Depression
"Really depressed"
"Really depressed"
"Doing a lot better"
"Really depressed"

## 2022-04-13 PROCEDURE — 90792 PSYCH DIAG EVAL W/MED SRVCS: CPT

## 2022-04-18 ENCOUNTER — INPATIENT (INPATIENT)
Facility: HOSPITAL | Age: 22
LOS: 3 days | Discharge: ANOTHER HOSPITAL | End: 2022-04-22
Attending: STUDENT IN AN ORGANIZED HEALTH CARE EDUCATION/TRAINING PROGRAM | Admitting: PSYCHIATRY & NEUROLOGY
Payer: COMMERCIAL

## 2022-04-18 VITALS
DIASTOLIC BLOOD PRESSURE: 78 MMHG | OXYGEN SATURATION: 100 % | RESPIRATION RATE: 18 BRPM | TEMPERATURE: 99 F | SYSTOLIC BLOOD PRESSURE: 118 MMHG | HEART RATE: 104 BPM

## 2022-04-18 DIAGNOSIS — F33.9 MAJOR DEPRESSIVE DISORDER, RECURRENT, UNSPECIFIED: ICD-10-CM

## 2022-04-18 LAB
FLUAV AG NPH QL: SIGNIFICANT CHANGE UP
FLUBV AG NPH QL: SIGNIFICANT CHANGE UP
RSV RNA NPH QL NAA+NON-PROBE: SIGNIFICANT CHANGE UP
SARS-COV-2 RNA SPEC QL NAA+PROBE: SIGNIFICANT CHANGE UP

## 2022-04-18 PROCEDURE — 99214 OFFICE O/P EST MOD 30 MIN: CPT

## 2022-04-18 PROCEDURE — 99222 1ST HOSP IP/OBS MODERATE 55: CPT

## 2022-04-18 RX ORDER — CALCIUM CARBONATE 500(1250)
1 TABLET ORAL
Refills: 0 | Status: DISCONTINUED | OUTPATIENT
Start: 2022-04-18 | End: 2022-04-22

## 2022-04-18 RX ORDER — LORATADINE 10 MG/1
10 TABLET ORAL AT BEDTIME
Refills: 0 | Status: DISCONTINUED | OUTPATIENT
Start: 2022-04-18 | End: 2022-04-18

## 2022-04-18 RX ORDER — DULOXETINE HYDROCHLORIDE 30 MG/1
90 CAPSULE, DELAYED RELEASE ORAL DAILY
Refills: 0 | Status: DISCONTINUED | OUTPATIENT
Start: 2022-04-19 | End: 2022-04-22

## 2022-04-18 RX ORDER — MIRTAZAPINE 45 MG/1
7.5 TABLET, ORALLY DISINTEGRATING ORAL AT BEDTIME
Refills: 0 | Status: DISCONTINUED | OUTPATIENT
Start: 2022-04-18 | End: 2022-04-22

## 2022-04-18 RX ORDER — CLONAZEPAM 1 MG
0.5 TABLET ORAL AT BEDTIME
Refills: 0 | Status: DISCONTINUED | OUTPATIENT
Start: 2022-04-18 | End: 2022-04-19

## 2022-04-18 RX ORDER — ATORVASTATIN CALCIUM 80 MG/1
10 TABLET, FILM COATED ORAL AT BEDTIME
Refills: 0 | Status: DISCONTINUED | OUTPATIENT
Start: 2022-04-18 | End: 2022-04-18

## 2022-04-18 RX ORDER — IBUPROFEN 200 MG
400 TABLET ORAL EVERY 4 HOURS
Refills: 0 | Status: DISCONTINUED | OUTPATIENT
Start: 2022-04-18 | End: 2022-04-22

## 2022-04-18 RX ORDER — LORATADINE 10 MG/1
10 TABLET ORAL DAILY
Refills: 0 | Status: DISCONTINUED | OUTPATIENT
Start: 2022-04-18 | End: 2022-04-22

## 2022-04-18 RX ORDER — ATORVASTATIN CALCIUM 80 MG/1
10 TABLET, FILM COATED ORAL AT BEDTIME
Refills: 0 | Status: DISCONTINUED | OUTPATIENT
Start: 2022-04-18 | End: 2022-04-22

## 2022-04-18 RX ORDER — HYDROXYZINE HCL 10 MG
25 TABLET ORAL EVERY 4 HOURS
Refills: 0 | Status: DISCONTINUED | OUTPATIENT
Start: 2022-04-18 | End: 2022-04-22

## 2022-04-18 RX ADMIN — Medication 0.5 MILLIGRAM(S): at 20:11

## 2022-04-18 RX ADMIN — ATORVASTATIN CALCIUM 10 MILLIGRAM(S): 80 TABLET, FILM COATED ORAL at 20:11

## 2022-04-18 RX ADMIN — MIRTAZAPINE 7.5 MILLIGRAM(S): 45 TABLET, ORALLY DISINTEGRATING ORAL at 20:10

## 2022-04-18 RX ADMIN — Medication 25 MILLIGRAM(S): at 21:08

## 2022-04-18 NOTE — BH INPATIENT PSYCHIATRY ASSESSMENT NOTE - CURRENT MEDICATION
MEDICATIONS  (STANDING):  atorvastatin 10 milliGRAM(s) Oral at bedtime  atorvastatin 10 milliGRAM(s) Oral at bedtime  clonazePAM  Tablet 0.5 milliGRAM(s) Oral at bedtime  loratadine 10 milliGRAM(s) Oral daily    MEDICATIONS  (PRN):  calcium carbonate    500 mG (Tums) Chewable 1 Tablet(s) Chew two times a day PRN Dyspepsia  hydrOXYzine hydrochloride 25 milliGRAM(s) Oral every 4 hours PRN Anxiety  ibuprofen  Tablet. 400 milliGRAM(s) Oral every 4 hours PRN Mild Pain (1 - 3), Moderate Pain (4 - 6)  loratadine 10 milliGRAM(s) Oral at bedtime PRN allergies  LORazepam     Tablet 1 milliGRAM(s) Oral every 6 hours PRN Anxiety  LORazepam   Injectable 2 milliGRAM(s) IntraMuscular once PRN Agitation   MEDICATIONS  (STANDING):  atorvastatin 10 milliGRAM(s) Oral at bedtime  clonazePAM  Tablet 0.5 milliGRAM(s) Oral at bedtime  DULoxetine 90 milliGRAM(s) Oral daily  loratadine 10 milliGRAM(s) Oral daily  mirtazapine 7.5 milliGRAM(s) Oral at bedtime  olsen 3 mg-0.02 mg tablet 2 Tablet(s) 2 Tablet(s) Oral at bedtime  Vestura 3MG/0.02MG TABLET 1 Tablet(s) 1 Tablet(s) Oral at bedtime    MEDICATIONS  (PRN):  calcium carbonate    500 mG (Tums) Chewable 1 Tablet(s) Chew two times a day PRN Dyspepsia  hydrOXYzine hydrochloride 25 milliGRAM(s) Oral every 4 hours PRN Anxiety  ibuprofen  Tablet. 400 milliGRAM(s) Oral every 4 hours PRN Mild Pain (1 - 3), Moderate Pain (4 - 6)  LORazepam     Tablet 1 milliGRAM(s) Oral every 6 hours PRN Anxiety  LORazepam   Injectable 2 milliGRAM(s) IntraMuscular once PRN Agitation   MEDICATIONS  (STANDING):  atorvastatin 10 milliGRAM(s) Oral at bedtime  clonazePAM  Tablet 0.5 milliGRAM(s) Oral at bedtime  DULoxetine 90 milliGRAM(s) Oral daily  loratadine 10 milliGRAM(s) Oral daily  mirtazapine 7.5 milliGRAM(s) Oral at bedtime  Vestura 3MG/0.02MG TABLET 1 Tablet(s) 1 Tablet(s) Oral at bedtime    MEDICATIONS  (PRN):  calcium carbonate    500 mG (Tums) Chewable 1 Tablet(s) Chew two times a day PRN Dyspepsia  hydrOXYzine hydrochloride 25 milliGRAM(s) Oral every 4 hours PRN Anxiety  ibuprofen  Tablet. 400 milliGRAM(s) Oral every 4 hours PRN Mild Pain (1 - 3), Moderate Pain (4 - 6)  LORazepam     Tablet 1 milliGRAM(s) Oral every 6 hours PRN Anxiety  LORazepam   Injectable 2 milliGRAM(s) IntraMuscular once PRN Agitation

## 2022-04-18 NOTE — BH PATIENT PROFILE - FALL HARM RISK - UNIVERSAL INTERVENTIONS
Bed in lowest position, wheels locked, appropriate side rails in place/Call bell, personal items and telephone in reach/Instruct patient to call for assistance before getting out of bed or chair/Non-slip footwear when patient is out of bed/[place] to call system/Physically safe environment - no spills, clutter or unnecessary equipment/Purposeful Proactive Rounding/Room/bathroom lighting operational, light cord in reach

## 2022-04-18 NOTE — CHART NOTE - NSCHARTNOTEFT_GEN_A_CORE
Screening Medical Evaluation    Patient Admitted from: [facility] admitting diagnosis: Recurrent major depressive disorder      PAST MEDICAL & SURGICAL HISTORY:  Anxiety disorder  Hyperlipidemia  Major depression    ALLERGIES:  No Known Allergies    SOCIAL HISTORY:  Patient endorses alcohol use and hx of polysubstance use.    FAMILY HISTORY:  No known pertinent family history in 1st degree relatives.      MEDICATIONS  (STANDING):  atorvastatin 10 milliGRAM(s) Oral at bedtime  clonazePAM  Tablet 0.5 milliGRAM(s) Oral at bedtime  loratadine 10 milliGRAM(s) Oral daily  mirtazapine 7.5 milliGRAM(s) Oral at bedtime    MEDICATIONS  (PRN):  calcium carbonate    500 mG (Tums) Chewable 1 Tablet(s) Chew two times a day PRN Dyspepsia  hydrOXYzine hydrochloride 25 milliGRAM(s) Oral every 4 hours PRN Anxiety  ibuprofen  Tablet. 400 milliGRAM(s) Oral every 4 hours PRN Mild Pain (1 - 3), Moderate Pain (4 - 6)  LORazepam     Tablet 1 milliGRAM(s) Oral every 6 hours PRN Anxiety  LORazepam   Injectable 2 milliGRAM(s) IntraMuscular once PRN Agitation      Vital Signs Last 24 Hrs  T(C): 36.5 (18 Apr 2022 18:33), Max: 37.4 (18 Apr 2022 15:23)  T(F): 97.7 (18 Apr 2022 18:33), Max: 99.4 (18 Apr 2022 15:23)  HR: 104 (18 Apr 2022 15:23) (104 - 104)  BP: 118/78 (18 Apr 2022 15:23) (118/78 - 118/78)  BP(mean): --  RR: 18 (18 Apr 2022 15:23) (18 - 18)  SpO2: 100% (18 Apr 2022 15:23) (100% - 100%)      PHYSICAL EXAM:  GENERAL: NAD, well-developed  HEAD:  Atraumatic, Normocephalic  EYES: EOMI, PERRLA, conjunctiva and sclera clear  NECK: Supple, No JVD  CHEST/LUNG: Clear to auscultation bilaterally; No wheeze  HEART: Regular rate and rhythm; No murmurs, rubs, or gallops  ABDOMEN: Soft, Nontender, Nondistended; Bowel sounds present  EXTREMITIES:  2+ Peripheral Pulses, No clubbing, cyanosis, or edema  PSYCH: AAOx3  NEUROLOGY: non-focal  SKIN: No rashes or lesions      Assessment and Plan:  21F admitted to [facility] for recurrent major depressive disorder w/ PMHx of HLD and seasonal allergies seen at bedside for medical screening evaluation. Patient has no acute complaints at this time. Patient denies fever, chills, headache, dizziness, lightheadedness, N/V, SOB, cough, congestion, chest pain, abdominal pain, dysuria, hematuria, diarrhea, constipation. Physical exam unremarkable, VSS. Admission labs pending.     1.) Recurrent major depressive disorder: Refer to primary team documentation for recs  2.) HLD: Continue atorvastatin 10mg QHS. F/u lipid panel

## 2022-04-18 NOTE — BH INPATIENT PSYCHIATRY ASSESSMENT NOTE - NSBHMETABOLIC_PSY_ALL_CORE_FT
BMI: BMI (kg/m2): 27.8 (03-30-22 @ 19:17)  HbA1c: A1C with Estimated Average Glucose Result: 4.7 % (03-01-22 @ 10:31)    Glucose:   BP: 118/78 (04-18-22 @ 15:23) (118/78 - 118/78)  Lipid Panel: Date/Time: 03-01-22 @ 10:31  Cholesterol, Serum: 164  Direct LDL: --  HDL Cholesterol, Serum: 61  Total Cholesterol/HDL Ration Measurement: --  Triglycerides, Serum: 110   BMI: BMI (kg/m2): 27.3 (04-18-22 @ 16:29)  HbA1c: A1C with Estimated Average Glucose Result: 4.6 % (04-19-22 @ 11:13)    Glucose: POCT Blood Glucose.: 122 mg/dL (04-17-22 @ 16:05)    BP: 118/78 (04-18-22 @ 15:23) (118/78 - 118/78)  Lipid Panel: Date/Time: 04-19-22 @ 11:13  Cholesterol, Serum: 184  Direct LDL: --  HDL Cholesterol, Serum: 70  Total Cholesterol/HDL Ration Measurement: --  Triglycerides, Serum: 135

## 2022-04-18 NOTE — BH PATIENT PROFILE - HOME MEDICATIONS
traZODone 50 mg oral tablet , 0.5 tab(s) orally once a day (at bedtime)   Cymbalta 30 mg oral delayed release capsule , 3 cap(s) orally once a day  atorvastatin 10 mg oral tablet , 1 tab(s) orally once a day (at bedtime)  freetext medication     - , 1 tab(s) orally once a day (at bedtime)

## 2022-04-18 NOTE — BH INPATIENT PSYCHIATRY ASSESSMENT NOTE - NSBHASSESSSUMMFT_PSY_ALL_CORE
21-year-old female, single, non-caregiver residing with her parents, currently attending [employer], with PPhx anxiety, panic and depression with long hx of outpatient treatment (currently in treatment with Dr. Morataya for the past 3 years), no history of violence, no legal involvement, no self-injurious behaviors, no suicide attempts, 2 prior psychiatric hospitalization recently at [facility] 1S from 2/28-3/9/22 and 3/31-4/12 no access to guns/weapons, who presented to partial appointment on 4/18 after SA via alcohol and pills on 4/16. Patient brought over for voluntary admission.      1. Admit to 2W, 9.13  2. NO CO needed   3. C/w Cymbalta 90mg  QD, Clonopin 0.5mg QHS, melatonin and Remeron 7.5mg for insomnia.   	-Continue Lipitor and claritin  4. Group and individual therapy  5. Dispo planning- per SW pending clinical improvement, Consider PHP Patient is a 21-year-old female, single, non-caregiver residing with her parents, currently taking a leave of absence from [employer], with PPhx anxiety, panic and depression with long hx of outpatient treatment (currently in treatment with ELIDA Mccann), no history of violence, no legal involvement, no self-injurious behaviors, no suicide attempts, 2 prior psychiatric hospitalization recently at [facility] 1S from 2/28-3/9/22 and 3/31-4/12 no access to guns/weapons, who presented to partial appointment on 4/18 after SA via alcohol and pills on 4/16. Patient brought over for voluntary admission.      1. Admit to 2W, 9.13  2. NO CO needed   3. C/w Cymbalta 90mg  QD, Clonopin 0.5mg QHS, melatonin and Remeron 7.5mg for insomnia.   	-Continue Lipitor and Claritin  4. Group and individual therapy  5. Dispo planning- per  pending clinical improvement

## 2022-04-18 NOTE — BH PATIENT PROFILE - NSBHSNSOFSAFETY_PSY_A_CORE
being left alone/counting to 10/fresh air breaks/journaling/listening to music/reading/taking a nap/taking some deep breaths/talking to someone/using a calming object

## 2022-04-18 NOTE — BH INPATIENT PSYCHIATRY ASSESSMENT NOTE - CASE SUMMARY
21-year-old female, single, non-caregiver residing with her parents, currently taking a leave of absence from [employer], with PPhx anxiety, panic and depression with long hx of outpatient treatment (currently in treatment with ELIDA Mccann), no history of violence, no legal involvement, no self-injurious behaviors, no suicide attempts, 2 prior psychiatric hospitalization recently at [facility] 1S from 2/28-3/9/22 and 3/31-4/12 no access to guns/weapons, who presented to partial appointment on 4/18 after SA via alcohol and pills on 4/16. Patient brought over for voluntary admission. Will monitor on routine checks, no indication for constant observation in a locked, supervised setting.  Agree with plan as stated.

## 2022-04-18 NOTE — BH PATIENT PROFILE - NSCMSPTSTRENGTHS_PSY_ALL_CORE
Able to adapt/Assertive/Compliance to treatment/Courageous/Expressive of emotions/Humor/Intact employment/Intelligence/Supportive family

## 2022-04-18 NOTE — BH INPATIENT PSYCHIATRY ASSESSMENT NOTE - HPI (INCLUDE ILLNESS QUALITY, SEVERITY, DURATION, TIMING, CONTEXT, MODIFYING FACTORS, ASSOCIATED SIGNS AND SYMPTOMS)
On assessment, patient stated that she was just discharged from  about a week ago. She reported feeling fine at discharge but since being home she has been feeling more anxious and depressed. She endorsed "low energy, amotivation, guilt, "zero appetite" and "terrible" sleep. She stated that she "relapsed, I felt overwhelmed, anxious and depressed" and that she feels her mother might be a trigger for her. She denied any recent panic attacks. She endorsed having chronic SI with no intent or plan. She stated that this was her first SA and it was impulsive. She drank 6-8oz of wine and took .5mg clonopin, 25mg trazodone and 5mg melatonin on 4/16 before going to sleep. She is upset the SA was unsuccessful and told her parents on 4/17. She was not brought into the hospital but spoke with someone on the hotline. She continues to endorse SI with no intent or plan on the unit. She denies any NSSIB but stated that she does pick her nails. Patient denies any substance use. She denies any hx of psychosis or brielle.            Patient is a 21-year-old female, single, non-caregiver residing with her parents, currently taking a leave of absence from [employer], with PPhx anxiety, panic and depression with long hx of outpatient treatment (currently in treatment with ELIDA Mccann), no history of violence, no legal involvement, no self-injurious behaviors, no suicide attempts, 2 prior psychiatric hospitalization recently at [facility] from 2/28-3/9/22 and 3/31-4/12 no access to guns/weapons, who presented to partial appointment on 4/18 after SA via alcohol and pills on 4/16. Patient brought over for voluntary admission.    On assessment, patient stated that she was just discharged from  about a week ago. She reported feeling fine at discharge but since being home she has been feeling more anxious and depressed. She endorsed "low energy, amotivation, guilt, "zero appetite" and "terrible" sleep. She stated that she "relapsed, I felt overwhelmed, anxious and depressed" and that she feels her mother might be a trigger for her. She denied any recent panic attacks. She endorsed having chronic SI with no intent or plan. She stated that this was her first SA and it was impulsive. She drank 6-8oz of wine and took .5mg clonopin, 25mg trazodone and 5mg melatonin on 4/16 before going to sleep. She is upset the SA was unsuccessful and told her parents on 4/17. She was not brought into the hospital but spoke with someone on the hotline. She continues to endorse SI with no intent or plan on the unit. She denies any NSSIB but stated that she does pick her nails. Patient denies any substance use. She denies any hx of psychosis or brielle.       As per EM Note from 4/18:  "This session was conducted via telehealth video conferencing platform with the patient's verbal consent. Both the [facility], [facility] and clinician were in a confidential location within their respective residences. Patients mother and father joined session.  Patient mood is "I can't do this."  Patient reports severely anxious and depressed.  She rates depression and anxiety 9.5 (1-10, 10 most severe).  Patient discussed frustration as felt okay for a couple day when out of the hospital and resurgence of symptoms.  Patient discussed guilt she has to get better, feels   pressure by her family to do so. + depressive sx of low energy, lack of motivation, anhedonia, hopelessness, helplessness and daily suicidal ideations. Patient reported on Saturday drank 2 glasses of wine, had her prescribed klonpon 0.5mg then took hydroxyzine 25mg and melatonin in a reported suicide attempt.  patient reports told her parents in the morning and then after hours emergency number contacted (spoke with Dr. Castillo)which a hospitalization was declined (did not meet criteria for involuntary hospitalization in the morning).  Patient reports ongoing suicidal ideations.  +passive SI of "wish I didnt wake up."  Patient unable to come up with a safety plan or explain ways she could keep herself safe.  Safety concerns discussed with pt. and family.  Agreed to come into [facility] for voluntary hospitalization (covid test to be perform by crisis clinic and legals to be signed).  Bed available on 2 west"

## 2022-04-18 NOTE — BH INPATIENT PSYCHIATRY ASSESSMENT NOTE - NSBHCHARTREVIEWVS_PSY_A_CORE FT
Vital Signs Last 24 Hrs  T(C): 37.4 (04-18-22 @ 15:23), Max: 37.4 (04-18-22 @ 15:23)  T(F): 99.4 (04-18-22 @ 15:23), Max: 99.4 (04-18-22 @ 15:23)  HR: 104 (04-18-22 @ 15:23) (104 - 104)  BP: 118/78 (04-18-22 @ 15:23) (118/78 - 118/78)  BP(mean): --  RR: 18 (04-18-22 @ 15:23) (18 - 18)  SpO2: 100% (04-18-22 @ 15:23) (100% - 100%)     Vital Signs Last 24 Hrs  T(C): 36.5 (04-18-22 @ 18:33), Max: 37.4 (04-18-22 @ 15:23)  T(F): 97.7 (04-18-22 @ 18:33), Max: 99.4 (04-18-22 @ 15:23)  HR: 104 (04-18-22 @ 15:23) (104 - 104)  BP: 118/78 (04-18-22 @ 15:23) (118/78 - 118/78)  BP(mean): --  RR: 18 (04-18-22 @ 15:23) (18 - 18)  SpO2: 100% (04-18-22 @ 15:23) (100% - 100%)     Vital Signs Last 24 Hrs  T(C): 36.3 (04-19-22 @ 21:28), Max: 36.3 (04-19-22 @ 21:28)  T(F): 97.3 (04-19-22 @ 21:28), Max: 97.3 (04-19-22 @ 21:28)  HR: --  BP: --  BP(mean): --  RR: --  SpO2: --

## 2022-04-18 NOTE — BH INPATIENT PSYCHIATRY ASSESSMENT NOTE - OTHER PAST PSYCHIATRIC HISTORY (INCLUDE DETAILS REGARDING ONSET, COURSE OF ILLNESS, INPATIENT/OUTPATIENT TREATMENT)
1 prior hospitalization at  at [facility] 2/28-3/9, outpatient care under Dr. Morataya for past 3 years, therapist Aida Martines  Most recently discharged from [facility] about 1 week ago 2 prior hospitalization at  at [facility] 2/28-3/9, outpatient care under Dr. Morataya for past 3 years, therapist iAda Martines.  Started PHP with ELIDA Mccann after most recent discharge from [facility] 1S (3/31-4/12)

## 2022-04-18 NOTE — BH INPATIENT PSYCHIATRY ASSESSMENT NOTE - MSE UNSTRUCTURED FT
The patient appears stated age, with good hygiene, and is dressed appropriately.  She was calm and cooperative with the interview.  She maintained appropriate eye contact.  No restlessness or slowing of movements observed.  Gait is steady.  The patient’s speech was fluent, normal in tone, rate, and volume.  The patient’s mood is “anxious, depressed.”  Her affect is constricted, tearful at times, stable, appropriate.  The patient’s thoughts are goal directed.  She does not have any delusions or hallucinations.  She has passive suicidal ideation with no intent or plan in the hospital.  No homicidal ideation, intent, or plan.  Insight is good.  Judgment is good.  Impulse control has been good on the unit.

## 2022-04-18 NOTE — BH PATIENT PROFILE - NSINDCRISISTRIGGER_PSY_ALL_CORE
Change in routine/Embarrassment/Pain/Rejection/Someone in personal space/Temperature/Heat/Boredom/Guilt/Loud noises/Physically ill/Scared/Suspiciousness

## 2022-04-19 DIAGNOSIS — F41.1 GENERALIZED ANXIETY DISORDER: ICD-10-CM

## 2022-04-19 LAB
A1C WITH ESTIMATED AVERAGE GLUCOSE RESULT: 4.6 % — SIGNIFICANT CHANGE UP (ref 4–5.6)
ALBUMIN SERPL ELPH-MCNC: 4.7 G/DL — SIGNIFICANT CHANGE UP (ref 3.3–5)
ALP SERPL-CCNC: 51 U/L — SIGNIFICANT CHANGE UP (ref 40–120)
ALT FLD-CCNC: 24 U/L — SIGNIFICANT CHANGE UP (ref 4–33)
ANION GAP SERPL CALC-SCNC: 13 MMOL/L — SIGNIFICANT CHANGE UP (ref 7–14)
AST SERPL-CCNC: 19 U/L — SIGNIFICANT CHANGE UP (ref 4–32)
BASOPHILS # BLD AUTO: 0.06 K/UL — SIGNIFICANT CHANGE UP (ref 0–0.2)
BASOPHILS NFR BLD AUTO: 1.1 % — SIGNIFICANT CHANGE UP (ref 0–2)
BILIRUB SERPL-MCNC: 1 MG/DL — SIGNIFICANT CHANGE UP (ref 0.2–1.2)
BUN SERPL-MCNC: 9 MG/DL — SIGNIFICANT CHANGE UP (ref 7–23)
CALCIUM SERPL-MCNC: 9.8 MG/DL — SIGNIFICANT CHANGE UP (ref 8.4–10.5)
CHLORIDE SERPL-SCNC: 98 MMOL/L — SIGNIFICANT CHANGE UP (ref 98–107)
CHOLEST SERPL-MCNC: 184 MG/DL — SIGNIFICANT CHANGE UP
CO2 SERPL-SCNC: 24 MMOL/L — SIGNIFICANT CHANGE UP (ref 22–31)
CREAT SERPL-MCNC: 0.77 MG/DL — SIGNIFICANT CHANGE UP (ref 0.5–1.3)
EGFR: 112 ML/MIN/1.73M2 — SIGNIFICANT CHANGE UP
EOSINOPHIL # BLD AUTO: 0.09 K/UL — SIGNIFICANT CHANGE UP (ref 0–0.5)
EOSINOPHIL NFR BLD AUTO: 1.7 % — SIGNIFICANT CHANGE UP (ref 0–6)
ESTIMATED AVERAGE GLUCOSE: 85 — SIGNIFICANT CHANGE UP
GLUCOSE BLDC GLUCOMTR-MCNC: 122 MG/DL — HIGH (ref 70–99)
GLUCOSE SERPL-MCNC: 71 MG/DL — SIGNIFICANT CHANGE UP (ref 70–99)
HCG SERPL-ACNC: <5 MIU/ML — SIGNIFICANT CHANGE UP
HCT VFR BLD CALC: 44.3 % — SIGNIFICANT CHANGE UP (ref 34.5–45)
HDLC SERPL-MCNC: 70 MG/DL — SIGNIFICANT CHANGE UP
HGB BLD-MCNC: 15.2 G/DL — SIGNIFICANT CHANGE UP (ref 11.5–15.5)
IANC: 2.5 K/UL — SIGNIFICANT CHANGE UP (ref 1.8–7.4)
IMM GRANULOCYTES NFR BLD AUTO: 0.4 % — SIGNIFICANT CHANGE UP (ref 0–1.5)
LIPID PNL WITH DIRECT LDL SERPL: 87 MG/DL — SIGNIFICANT CHANGE UP
LYMPHOCYTES # BLD AUTO: 2 K/UL — SIGNIFICANT CHANGE UP (ref 1–3.3)
LYMPHOCYTES # BLD AUTO: 37.2 % — SIGNIFICANT CHANGE UP (ref 13–44)
MCHC RBC-ENTMCNC: 30 PG — SIGNIFICANT CHANGE UP (ref 27–34)
MCHC RBC-ENTMCNC: 34.3 GM/DL — SIGNIFICANT CHANGE UP (ref 32–36)
MCV RBC AUTO: 87.5 FL — SIGNIFICANT CHANGE UP (ref 80–100)
MONOCYTES # BLD AUTO: 0.71 K/UL — SIGNIFICANT CHANGE UP (ref 0–0.9)
MONOCYTES NFR BLD AUTO: 13.2 % — SIGNIFICANT CHANGE UP (ref 2–14)
NEUTROPHILS # BLD AUTO: 2.5 K/UL — SIGNIFICANT CHANGE UP (ref 1.8–7.4)
NEUTROPHILS NFR BLD AUTO: 46.4 % — SIGNIFICANT CHANGE UP (ref 43–77)
NON HDL CHOLESTEROL: 114 MG/DL — SIGNIFICANT CHANGE UP
NRBC # BLD: 0 /100 WBCS — SIGNIFICANT CHANGE UP
NRBC # FLD: 0 K/UL — SIGNIFICANT CHANGE UP
PLATELET # BLD AUTO: 203 K/UL — SIGNIFICANT CHANGE UP (ref 150–400)
POTASSIUM SERPL-MCNC: 4.2 MMOL/L — SIGNIFICANT CHANGE UP (ref 3.5–5.3)
POTASSIUM SERPL-SCNC: 4.2 MMOL/L — SIGNIFICANT CHANGE UP (ref 3.5–5.3)
PROT SERPL-MCNC: 7.9 G/DL — SIGNIFICANT CHANGE UP (ref 6–8.3)
RBC # BLD: 5.06 M/UL — SIGNIFICANT CHANGE UP (ref 3.8–5.2)
RBC # FLD: 12.5 % — SIGNIFICANT CHANGE UP (ref 10.3–14.5)
SODIUM SERPL-SCNC: 135 MMOL/L — SIGNIFICANT CHANGE UP (ref 135–145)
TRIGL SERPL-MCNC: 135 MG/DL — SIGNIFICANT CHANGE UP
TSH SERPL-MCNC: 1.22 UIU/ML — SIGNIFICANT CHANGE UP (ref 0.27–4.2)
WBC # BLD: 5.38 K/UL — SIGNIFICANT CHANGE UP (ref 3.8–10.5)
WBC # FLD AUTO: 5.38 K/UL — SIGNIFICANT CHANGE UP (ref 3.8–10.5)

## 2022-04-19 PROCEDURE — 99231 SBSQ HOSP IP/OBS SF/LOW 25: CPT

## 2022-04-19 PROCEDURE — 93010 ELECTROCARDIOGRAM REPORT: CPT

## 2022-04-19 RX ORDER — CLONAZEPAM 1 MG
0.5 TABLET ORAL AT BEDTIME
Refills: 0 | Status: DISCONTINUED | OUTPATIENT
Start: 2022-04-19 | End: 2022-04-20

## 2022-04-19 RX ADMIN — LORATADINE 10 MILLIGRAM(S): 10 TABLET ORAL at 09:23

## 2022-04-19 RX ADMIN — MIRTAZAPINE 7.5 MILLIGRAM(S): 45 TABLET, ORALLY DISINTEGRATING ORAL at 21:22

## 2022-04-19 RX ADMIN — DULOXETINE HYDROCHLORIDE 90 MILLIGRAM(S): 30 CAPSULE, DELAYED RELEASE ORAL at 09:23

## 2022-04-19 RX ADMIN — ATORVASTATIN CALCIUM 10 MILLIGRAM(S): 80 TABLET, FILM COATED ORAL at 21:22

## 2022-04-19 NOTE — DIETITIAN INITIAL EVALUATION ADULT - OTHER INFO
Patient admitted to [facility] d/t SI. Patient lying in her bed at time of interview. Endorses poor appetite. Follows a Kosher diet. Had no specific food preferences. Denies any GI distress. Reports weight loss of 8# in the past month. Patient refuses nutrition supplements and/or multivitamin at current time. Encouraged po intake.

## 2022-04-19 NOTE — PSYCHIATRIC REHAB INITIAL EVALUATION - NSBHEDUCURSCHOOL_PSY_ALL_CORE
the pt reported she withdrew but would like to speak with someone about going back to school in the Fall.

## 2022-04-19 NOTE — BH INPATIENT PSYCHIATRY PROGRESS NOTE - NSBHASSESSSUMMFT_PSY_ALL_CORE
Patient able to swallow liquids without any difficulty.  Following commands.  No signs of aspiration or discomfort.  No cough after drinking.   symptom management, safety planning, care coordination  21-year-old female, single, non-caregiver residing with her parents, currently attending [employer], with PPhx anxiety, panic and depression with long hx of outpatient treatment (currently in treatment with Dr. Morataya for the past 3 years), no history of violence, no legal involvement, no self-injurious behaviors, no suicide attempts, 2 prior psychiatric hospitalization recently at [facility] 1S from 2/28-3/9/22 and 3/31-4/12 no access to guns/weapons, who presented to partial appointment on 4/18 after SA via alcohol and pills on 4/16. Patient brought over for voluntary admission.    On assessment, patient remains depressed and isolative to her room. She endorses passive SI with no plan or intent on the unit.    1. Admit to 2W, 9.13  2. NO CO needed   3. C/w Cymbalta 90mg  QD, Clonopin 0.5mg QHS, melatonin and Remeron 7.5mg for insomnia.   	-Continue Lipitor and claritin  4. Group and individual therapy  5. Dispo planning- per SW pending clinical improvement   Patient is a 21-year-old female, single, non-caregiver residing with her parents, currently taking a leave of absence from [employer], with PPhx anxiety, panic and depression with long hx of outpatient treatment (currently in treatment with ELIDA Mccann), no history of violence, no legal involvement, no self-injurious behaviors, no suicide attempts, 2 prior psychiatric hospitalization recently at [facility] 1S from 2/28-3/9/22 and 3/31-4/12 no access to guns/weapons, who presented to partial appointment on 4/18 after SA via alcohol and pills on 4/16. Patient brought over for voluntary admission.    On assessment, patient remains depressed and isolative to her room. She endorses passive SI with no plan or intent on the unit. She reported improved sleep with the Remeron.     1. Admit to 2W, 9.13  2. NO CO needed   3. C/w Cymbalta 90mg  QD, Clonopin 0.5mg QHS, melatonin and Remeron 7.5mg for insomnia.   	-Continue Lipitor and Claritin  4. Group and individual therapy  5. Dispo planning- per SW pending clinical improvement

## 2022-04-19 NOTE — BH INPATIENT PSYCHIATRY PROGRESS NOTE - NSBHFUPINTERVALHXFT_PSY_A_CORE
Chart reviewed and case discussed with treatment team. No events reported overnight. Sleep was "pretty well" and appetite is poor.  Patient stated that she slept well with Clonopin, Atarax and Remeron last night. She endorses passive SI, no intent or plan. She is requesting transfer to . She remains isolative to her room, encouraged to attend groups. Patient is compliant with medications, no adverse effects reported.

## 2022-04-19 NOTE — PSYCHIATRIC REHAB INITIAL EVALUATION - NSBHPRTOOLBOX_PSY_ALL_CORE
the pt reported her recovery skills are doing puzzles, coloring, doing word searches, listening to music, and watching t.v.

## 2022-04-19 NOTE — DIETITIAN INITIAL EVALUATION ADULT - PERTINENT MEDS FT
MEDICATIONS  (STANDING):  atorvastatin 10 milliGRAM(s) Oral at bedtime  clonazePAM  Tablet 0.5 milliGRAM(s) Oral at bedtime  DULoxetine 90 milliGRAM(s) Oral daily  loratadine 10 milliGRAM(s) Oral daily  mirtazapine 7.5 milliGRAM(s) Oral at bedtime  olsen 3 mg-0.02 mg tablet 2 Tablet(s) 2 Tablet(s) Oral at bedtime  Vestura 3MG/0.02MG TABLET 1 Tablet(s) 1 Tablet(s) Oral at bedtime    MEDICATIONS  (PRN):  calcium carbonate    500 mG (Tums) Chewable 1 Tablet(s) Chew two times a day PRN Dyspepsia  hydrOXYzine hydrochloride 25 milliGRAM(s) Oral every 4 hours PRN Anxiety  ibuprofen  Tablet. 400 milliGRAM(s) Oral every 4 hours PRN Mild Pain (1 - 3), Moderate Pain (4 - 6)  LORazepam     Tablet 1 milliGRAM(s) Oral every 6 hours PRN Anxiety  LORazepam   Injectable 2 milliGRAM(s) IntraMuscular once PRN Agitation

## 2022-04-19 NOTE — PSYCHIATRIC REHAB INITIAL EVALUATION - NSBHPRRECOMMEND_PSY_ALL_CORE
The writer met with the patient to orient her to psychiatric rehabilitation staff and services. Throughout the session, the patient was cooperative, had a calm demeanor, and was a reliable historian. The patient reported her reason for admission was that she was having suicidal thoughts after she was discharged from [facility] last week. As per the patient’s chart, she presented to partial appointment after suicide attempt via alcohol and pills. The writer and patient established a collaborative psychiatric rehabilitation goal for the patient to work on over the next seven days. The patient’s psychiatric rehabilitation goal is to identify 2 coping skills that assist in improving mood for her depressive symptoms. Over the next seven days, Psychiatric Rehabilitation staff will utilize individual psychotherapy and psychoeducation to assist the patient in reaching her treatment goal. The patient currently denies HI/AH/VH and reported having passive SI with no intent.

## 2022-04-19 NOTE — BH SOCIAL WORK INITIAL PSYCHOSOCIAL EVALUATION - OTHER PAST PSYCHIATRIC HISTORY (INCLUDE DETAILS REGARDING ONSET, COURSE OF ILLNESS, INPATIENT/OUTPATIENT TREATMENT)
Patient was recently discharged from [facility] and attending PHP. See history for details.

## 2022-04-20 PROCEDURE — 99232 SBSQ HOSP IP/OBS MODERATE 35: CPT | Mod: 25

## 2022-04-20 PROCEDURE — 90853 GROUP PSYCHOTHERAPY: CPT

## 2022-04-20 RX ORDER — CLONAZEPAM 1 MG
0.5 TABLET ORAL AT BEDTIME
Refills: 0 | Status: DISCONTINUED | OUTPATIENT
Start: 2022-04-20 | End: 2022-04-22

## 2022-04-20 RX ADMIN — ATORVASTATIN CALCIUM 10 MILLIGRAM(S): 80 TABLET, FILM COATED ORAL at 20:46

## 2022-04-20 RX ADMIN — Medication 400 MILLIGRAM(S): at 15:05

## 2022-04-20 RX ADMIN — LORATADINE 10 MILLIGRAM(S): 10 TABLET ORAL at 09:32

## 2022-04-20 RX ADMIN — DULOXETINE HYDROCHLORIDE 90 MILLIGRAM(S): 30 CAPSULE, DELAYED RELEASE ORAL at 09:32

## 2022-04-20 RX ADMIN — MIRTAZAPINE 7.5 MILLIGRAM(S): 45 TABLET, ORALLY DISINTEGRATING ORAL at 20:46

## 2022-04-20 NOTE — BH INPATIENT PSYCHIATRY PROGRESS NOTE - NSBHFUPINTERVALHXFT_PSY_A_CORE
Chart reviewed, including vital signs, medication administration record, lab results, and nursing notes.   Case discussed with nursing, psychology, psych rehab, and social work in team meeting.     Patient is seen in a day room, in no acute distress, amenable to interview. States that after her discharge from the hospital, she felt her depression and anxiety returned. She reports having attended partial hospitalization, but constantly rated for depression at 10 out of 10 in severity, whereas her peers rated their depression at 1 out of 10. She feels that this made her feel inadequate. She also identifies her mother as a “trigger“ because she frequently “pushes her to get better“. For example, instead of listening to her concerns, she will recommend that she take a walk. Her mother also blames her condition on the medication she’s taking. 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 She states that she is “tired of trying“. Acknowledges that she started Remeron and Clonopin two days ago due to poor sleep. Reports decreased sleep and decreased appetite. Nice medication side effects. Denies SI/HI/ATH. She continues to be ambivalent about receiving ECT, and states that her mother is particularly against this. She agrees to allow me to contact her father and Dr. Hobson. Chart reviewed, including vital signs, medication administration record, lab results, and nursing notes.   Case discussed with nursing, psychology, psych rehab, and social work in team meeting.     Patient is seen in the day room, in no acute distress, amenable to interview. States that after her discharge from the hospital, she felt her depression and anxiety returned. She reports having attended partial hospitalization, but constantly rated for depression at 10 out of 10 in severity, whereas her peers rated their depression at 1 out of 10. She feels that this made her feel inadequate. She also identifies her mother as a “trigger“ because she frequently “pushes her to get better“. For example, instead of listening to her concerns, she will recommend that she take a walk. Her mother also blames her condition on the medication she’s taking. 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 She states that she is “tired of trying“. Acknowledges that she started Remeron and Klonopin two days ago due to poor sleep. Reports decreased sleep and decreased appetite. Denies medication side effects. Denies SI/HI/AVH. She continues to be ambivalent about receiving ECT, and states that her mother is particularly against this. She agrees to allow me to contact her father and Dr. Morataya.

## 2022-04-20 NOTE — BH INPATIENT PSYCHIATRY PROGRESS NOTE - NSBHASSESSSUMMFT_PSY_ALL_CORE
Patient is a 21-year-old female, single, non-caregiver residing with her parents, currently taking a leave of absence from [facility], with PPhx anxiety, panic and depression with long hx of outpatient treatment (currently in treatment with ELIDA Mccann), no history of violence, no legal involvement, no self-injurious behaviors, no suicide attempts, 2 prior psychiatric hospitalization recently at [facility] from 2/28-3/9/22 and 3/31-4/12 no access to guns/weapons, who presented to partial appointment on 4/18 after SA via alcohol and pills on 4/16. Patient brought over for voluntary admission.    On assessment, patient remains depressed and isolative to her room. She endorses passive SI with no plan or intent on the unit. She reported improved sleep with the Remeron.     1. Admit to 2W, 9.13  2. NO CO needed   3. C/w Cymbalta 90mg  QD, Clonopin 0.5mg QHS, melatonin and Remeron 7.5mg for insomnia.   	-Continue Lipitor and Claritin  4. Group and individual therapy  5. Dispo planning- per SW pending clinical improvement   Patient is a 21-year-old female with a history of major depressive disorder, generalized anxiety disorder who presents to the hospital for a suicide attempt by overdose on 4-6 ounces of alcohol and half a milligram of Klonopin. The patient has been psychiatrically hospitalized three times in the past six months, and was recently discharged last week, currently participating in partial hospitalization. The patient has had numerous failed medication trials. The normal course of her illness can be characterized by rapid decompensation, but also rapid improvement after hospitalization. She meets criteria for major depressive disorder with anxious distress.   PsyHx: Sees Dr. Morataya x 3 years. Therapist Aida Martines 1x hospitalization 1 month ago at [facility].  SH: Undergrad at [employer]. Lives with parents.    1. Admission status  9.13 (Voluntary)    2. Significant lab findings  None    3. Psychotropic medications  - Duloxetine 90 mg daily (depression)  	- Home medication  - Clonazepam 0.5 mg QHS (insomnia)  	- Home medication  - Mirtazapine 7.5 mg QHS (insomnia)  	- Home medication, started two days ago    4. Medical conditions, other medications, consults  - Atorvastatin 10 mg QHS (dyslipidemia)  - Loratadine 10 mg daily (allergies)    5. Psychosocial interventions  - Family contacted: DIOGO

## 2022-04-20 NOTE — BH INPATIENT PSYCHIATRY PROGRESS NOTE - MSE UNSTRUCTURED FT
The patient appears stated age, with good hygiene, and is dressed appropriately.  She was calm and cooperative with the interview.  She maintained appropriate eye contact.  No restlessness or slowing of movements observed.  Gait is steady.  The patient’s speech was fluent, normal in tone, rate, and volume.  The patient’s mood is “anxious”  Her affect is constricted, tearful at times, stable, appropriate.  The patient’s thoughts are goal directed.  She does not have any delusions or hallucinations.  She has passive suicidal ideation with no intent or plan in the hospital.  No homicidal ideation, intent, or plan.  Insight is good.  Judgment is fair.  Impulse control has been good on the unit.
The patient appears stated age, with good hygiene, and is dressed appropriately.  She was calm and cooperative with the interview.  She maintained appropriate eye contact.  No restlessness or slowing of movements observed.  Gait is steady.  The patient’s speech was fluent, normal in tone, rate, and volume.  The patient’s mood is “anxious”  Her affect is constricted, tearful at times, stable, appropriate.  The patient’s thoughts are goal directed.  She does not have any delusions or hallucinations.  She has passive suicidal ideation with no intent or plan in the hospital.  No homicidal ideation, intent, or plan.  Insight is good.  Judgment is fair.  Impulse control has been good on the unit.

## 2022-04-21 PROCEDURE — 90832 PSYTX W PT 30 MINUTES: CPT | Mod: 59

## 2022-04-21 PROCEDURE — 99231 SBSQ HOSP IP/OBS SF/LOW 25: CPT

## 2022-04-21 RX ADMIN — MIRTAZAPINE 7.5 MILLIGRAM(S): 45 TABLET, ORALLY DISINTEGRATING ORAL at 21:21

## 2022-04-21 RX ADMIN — ATORVASTATIN CALCIUM 10 MILLIGRAM(S): 80 TABLET, FILM COATED ORAL at 21:21

## 2022-04-21 RX ADMIN — DULOXETINE HYDROCHLORIDE 90 MILLIGRAM(S): 30 CAPSULE, DELAYED RELEASE ORAL at 08:53

## 2022-04-21 RX ADMIN — LORATADINE 10 MILLIGRAM(S): 10 TABLET ORAL at 08:53

## 2022-04-21 NOTE — BH INPATIENT PSYCHIATRY PROGRESS NOTE - NSBHFUPINTERVALHXFT_PSY_A_CORE
Chart reviewed, including vital signs, medication administration record, lab results, and nursing notes.   Case discussed with nursing, psychology, psych rehab, and social work in team meeting.     Patient is seen in the group room, in no acute distress, amenable to interview. She denies any issues with her medication at this time. She asked me to contact her father and her psychiatrist Dr. Morataya. She acknowledges that her suicide attempt “would not have worked“ and acknowledges the possibility that being hospitalized is the only way her mother recognizes her distress. She states that she is “frustrated with DBT skills“ and hopes that she can receive ECT to “take the edge off“. She continues to report feeling depressed. Currently denies SI/HI/AVH.

## 2022-04-21 NOTE — BH INPATIENT PSYCHIATRY PROGRESS NOTE - NSBHASSESSSUMMFT_PSY_ALL_CORE
Patient is a 21-year-old female with a history of major depressive disorder, generalized anxiety disorder who presents to the hospital for a suicide attempt by overdose on 4-6 ounces of alcohol and half a milligram of Klonopin. The patient has been psychiatrically hospitalized three times in the past six months, and was recently discharged last week, currently participating in partial hospitalization. The patient has had numerous failed medication trials. The normal course of her illness can be characterized by rapid decompensation, but also rapid improvement after hospitalization. She meets criteria for major depressive disorder with anxious distress.   PsyHx: Sees Dr. Morataya x 3 years. Therapist Aida Martines 1x hospitalization 1 month ago at [facility].  SH: Undergrad at [employer]. Lives with parents.    1. Admission status  9.13 (Voluntary)    2. Significant lab findings  None    3. Psychotropic medications  - Duloxetine 90 mg daily (depression)  	- Home medication  - Clonazepam 0.5 mg QHS (insomnia)  	- Home medication  - Mirtazapine 7.5 mg QHS (insomnia)  	- Home medication, started two days ago    4. Medical conditions, other medications, consults  - Atorvastatin 10 mg QHS (dyslipidemia)  - Loratadine 10 mg daily (allergies)    5. Psychosocial interventions  - Family contacted: Contacted father 4/21

## 2022-04-22 ENCOUNTER — INPATIENT (INPATIENT)
Facility: HOSPITAL | Age: 22
LOS: 10 days | Discharge: PSYCHIATRIC OR SUBSTANCE USE FACILITY | End: 2022-05-03
Attending: HOSPITALIST | Admitting: HOSPITALIST
Payer: COMMERCIAL

## 2022-04-22 VITALS
TEMPERATURE: 99 F | DIASTOLIC BLOOD PRESSURE: 87 MMHG | SYSTOLIC BLOOD PRESSURE: 119 MMHG | OXYGEN SATURATION: 97 % | HEIGHT: 64 IN | HEART RATE: 117 BPM | RESPIRATION RATE: 18 BRPM

## 2022-04-22 VITALS — HEART RATE: 108 BPM | TEMPERATURE: 98 F | SYSTOLIC BLOOD PRESSURE: 109 MMHG | DIASTOLIC BLOOD PRESSURE: 73 MMHG

## 2022-04-22 DIAGNOSIS — U07.1 COVID-19: ICD-10-CM

## 2022-04-22 LAB
ALBUMIN SERPL ELPH-MCNC: 4.3 G/DL — SIGNIFICANT CHANGE UP (ref 3.3–5)
ALP SERPL-CCNC: 61 U/L — SIGNIFICANT CHANGE UP (ref 40–120)
ALT FLD-CCNC: 31 U/L — SIGNIFICANT CHANGE UP (ref 4–33)
ANION GAP SERPL CALC-SCNC: 12 MMOL/L — SIGNIFICANT CHANGE UP (ref 7–14)
AST SERPL-CCNC: 23 U/L — SIGNIFICANT CHANGE UP (ref 4–32)
B PERT DNA SPEC QL NAA+PROBE: SIGNIFICANT CHANGE UP
B PERT+PARAPERT DNA PNL SPEC NAA+PROBE: SIGNIFICANT CHANGE UP
BASOPHILS # BLD AUTO: 0.08 K/UL — SIGNIFICANT CHANGE UP (ref 0–0.2)
BASOPHILS NFR BLD AUTO: 1.7 % — SIGNIFICANT CHANGE UP (ref 0–2)
BILIRUB SERPL-MCNC: 0.5 MG/DL — SIGNIFICANT CHANGE UP (ref 0.2–1.2)
BORDETELLA PARAPERTUSSIS (RAPRVP): SIGNIFICANT CHANGE UP
BUN SERPL-MCNC: 8 MG/DL — SIGNIFICANT CHANGE UP (ref 7–23)
C PNEUM DNA SPEC QL NAA+PROBE: SIGNIFICANT CHANGE UP
CALCIUM SERPL-MCNC: 9.5 MG/DL — SIGNIFICANT CHANGE UP (ref 8.4–10.5)
CHLORIDE SERPL-SCNC: 102 MMOL/L — SIGNIFICANT CHANGE UP (ref 98–107)
CO2 SERPL-SCNC: 25 MMOL/L — SIGNIFICANT CHANGE UP (ref 22–31)
CREAT SERPL-MCNC: 0.72 MG/DL — SIGNIFICANT CHANGE UP (ref 0.5–1.3)
CRP SERPL-MCNC: 20.8 MG/L — HIGH
D DIMER BLD IA.RAPID-MCNC: <150 NG/ML DDU — SIGNIFICANT CHANGE UP
EGFR: 121 ML/MIN/1.73M2 — SIGNIFICANT CHANGE UP
EOSINOPHIL # BLD AUTO: 0.08 K/UL — SIGNIFICANT CHANGE UP (ref 0–0.5)
EOSINOPHIL NFR BLD AUTO: 1.7 % — SIGNIFICANT CHANGE UP (ref 0–6)
FERRITIN SERPL-MCNC: 141 NG/ML — SIGNIFICANT CHANGE UP (ref 15–150)
FLUAV SUBTYP SPEC NAA+PROBE: SIGNIFICANT CHANGE UP
FLUBV RNA SPEC QL NAA+PROBE: SIGNIFICANT CHANGE UP
GLUCOSE SERPL-MCNC: 89 MG/DL — SIGNIFICANT CHANGE UP (ref 70–99)
HADV DNA SPEC QL NAA+PROBE: SIGNIFICANT CHANGE UP
HCG SERPL-ACNC: <5 MIU/ML — SIGNIFICANT CHANGE UP
HCOV 229E RNA SPEC QL NAA+PROBE: SIGNIFICANT CHANGE UP
HCOV HKU1 RNA SPEC QL NAA+PROBE: SIGNIFICANT CHANGE UP
HCOV NL63 RNA SPEC QL NAA+PROBE: SIGNIFICANT CHANGE UP
HCOV OC43 RNA SPEC QL NAA+PROBE: SIGNIFICANT CHANGE UP
HCT VFR BLD CALC: 41.8 % — SIGNIFICANT CHANGE UP (ref 34.5–45)
HGB BLD-MCNC: 14.5 G/DL — SIGNIFICANT CHANGE UP (ref 11.5–15.5)
HMPV RNA SPEC QL NAA+PROBE: SIGNIFICANT CHANGE UP
HPIV1 RNA SPEC QL NAA+PROBE: SIGNIFICANT CHANGE UP
HPIV2 RNA SPEC QL NAA+PROBE: SIGNIFICANT CHANGE UP
HPIV3 RNA SPEC QL NAA+PROBE: SIGNIFICANT CHANGE UP
HPIV4 RNA SPEC QL NAA+PROBE: SIGNIFICANT CHANGE UP
IANC: 2.27 K/UL — SIGNIFICANT CHANGE UP (ref 1.8–7.4)
LYMPHOCYTES # BLD AUTO: 0.89 K/UL — LOW (ref 1–3.3)
LYMPHOCYTES # BLD AUTO: 18.3 % — SIGNIFICANT CHANGE UP (ref 13–44)
M PNEUMO DNA SPEC QL NAA+PROBE: SIGNIFICANT CHANGE UP
MCHC RBC-ENTMCNC: 30.5 PG — SIGNIFICANT CHANGE UP (ref 27–34)
MCHC RBC-ENTMCNC: 34.7 GM/DL — SIGNIFICANT CHANGE UP (ref 32–36)
MCV RBC AUTO: 88 FL — SIGNIFICANT CHANGE UP (ref 80–100)
MONOCYTES # BLD AUTO: 0.76 K/UL — SIGNIFICANT CHANGE UP (ref 0–0.9)
MONOCYTES NFR BLD AUTO: 15.7 % — HIGH (ref 2–14)
NEUTROPHILS # BLD AUTO: 2.91 K/UL — SIGNIFICANT CHANGE UP (ref 1.8–7.4)
NEUTROPHILS NFR BLD AUTO: 59.1 % — SIGNIFICANT CHANGE UP (ref 43–77)
PLATELET # BLD AUTO: 169 K/UL — SIGNIFICANT CHANGE UP (ref 150–400)
POTASSIUM SERPL-MCNC: 4.4 MMOL/L — SIGNIFICANT CHANGE UP (ref 3.5–5.3)
POTASSIUM SERPL-SCNC: 4.4 MMOL/L — SIGNIFICANT CHANGE UP (ref 3.5–5.3)
PROCALCITONIN SERPL-MCNC: 0.04 NG/ML — SIGNIFICANT CHANGE UP (ref 0.02–0.1)
PROT SERPL-MCNC: 7.9 G/DL — SIGNIFICANT CHANGE UP (ref 6–8.3)
RAPID RVP RESULT: DETECTED
RBC # BLD: 4.75 M/UL — SIGNIFICANT CHANGE UP (ref 3.8–5.2)
RBC # FLD: 12.3 % — SIGNIFICANT CHANGE UP (ref 10.3–14.5)
RSV RNA SPEC QL NAA+PROBE: SIGNIFICANT CHANGE UP
RV+EV RNA SPEC QL NAA+PROBE: SIGNIFICANT CHANGE UP
SARS-COV-2 RNA SPEC QL NAA+PROBE: DETECTED
SODIUM SERPL-SCNC: 139 MMOL/L — SIGNIFICANT CHANGE UP (ref 135–145)
WBC # BLD: 4.85 K/UL — SIGNIFICANT CHANGE UP (ref 3.8–10.5)
WBC # FLD AUTO: 4.85 K/UL — SIGNIFICANT CHANGE UP (ref 3.8–10.5)

## 2022-04-22 PROCEDURE — 99232 SBSQ HOSP IP/OBS MODERATE 35: CPT

## 2022-04-22 PROCEDURE — 99285 EMERGENCY DEPT VISIT HI MDM: CPT

## 2022-04-22 PROCEDURE — 90832 PSYTX W PT 30 MINUTES: CPT | Mod: 59

## 2022-04-22 PROCEDURE — 71045 X-RAY EXAM CHEST 1 VIEW: CPT | Mod: 26

## 2022-04-22 PROCEDURE — 90853 GROUP PSYCHOTHERAPY: CPT

## 2022-04-22 RX ORDER — SODIUM CHLORIDE 9 MG/ML
1000 INJECTION INTRAMUSCULAR; INTRAVENOUS; SUBCUTANEOUS ONCE
Refills: 0 | Status: COMPLETED | OUTPATIENT
Start: 2022-04-22 | End: 2022-04-22

## 2022-04-22 RX ORDER — ACETAMINOPHEN 500 MG
650 TABLET ORAL EVERY 6 HOURS
Refills: 0 | Status: DISCONTINUED | OUTPATIENT
Start: 2022-04-22 | End: 2022-05-03

## 2022-04-22 RX ORDER — LANOLIN ALCOHOL/MO/W.PET/CERES
6 CREAM (GRAM) TOPICAL AT BEDTIME
Refills: 0 | Status: DISCONTINUED | OUTPATIENT
Start: 2022-04-22 | End: 2022-05-03

## 2022-04-22 RX ORDER — IBUPROFEN 200 MG
400 TABLET ORAL ONCE
Refills: 0 | Status: COMPLETED | OUTPATIENT
Start: 2022-04-22 | End: 2022-04-22

## 2022-04-22 RX ORDER — MIRTAZAPINE 45 MG/1
15 TABLET, ORALLY DISINTEGRATING ORAL AT BEDTIME
Refills: 0 | Status: DISCONTINUED | OUTPATIENT
Start: 2022-04-22 | End: 2022-04-22

## 2022-04-22 RX ADMIN — Medication 400 MILLIGRAM(S): at 11:31

## 2022-04-22 RX ADMIN — SODIUM CHLORIDE 1000 MILLILITER(S): 9 INJECTION INTRAMUSCULAR; INTRAVENOUS; SUBCUTANEOUS at 20:41

## 2022-04-22 RX ADMIN — LORATADINE 10 MILLIGRAM(S): 10 TABLET ORAL at 08:22

## 2022-04-22 RX ADMIN — DULOXETINE HYDROCHLORIDE 90 MILLIGRAM(S): 30 CAPSULE, DELAYED RELEASE ORAL at 08:22

## 2022-04-22 NOTE — ED PROVIDER NOTE - OBJECTIVE STATEMENT
21F PMH HLD, anxiety, depression sent from [facility] for medical admission in setting of COVID+ status. Pt reports subjective fever, chills, body aches, stuffy nose, cough x 3 days, tested positive for COVID+ today, is COVID vaccinated x3. Currently at [facility] for depression and anxiety, recent suicide attempt with OD. Pt requires bed on medical floor for further psychiatric care due to COVID positive status ([facility] and [facility] no longer has COVID units). Pt denies CP, SOB, leg swelling/pain, h/o DVT/PE. No abd pain, NV, urinary sx. +Intermittent diarrhea (which is her baseline).

## 2022-04-22 NOTE — BH PSYCHOLOGY - CLINICIAN PSYCHOTHERAPY NOTE - NSBHPSYCHOLNARRATIVE_PSY_A_CORE FT
Patient was alert, cooperative, and in control. Oriented x3. Casually dressed, fairly groomed. Maintained good eye contact. Speech normal in production, rate, volume, and tone. No abnormal psychomotor behavior. Mood "depressed, anxious" with congruent affect. Thought process logical, goal-directed. Thought content relevant to discussion. Expressed fleeting, passive suicidal ideation ("I don't have the energy"), denied current intent, plan, and self-harm urges. Committed to remaining safe on the unit and informing staff if unable to manage behaviors. Denied auditory/visual hallucinations and homicidal ideation, intent, and plan. Impulse control intact. Insight and judgment fair.    Patient reported continued anxiety, depression, and suicidality, although stated she feels "a little better." Patient discussed expectations of skills needing to work, her having to do them to feel better, feeling hopeless when they do not work as quickly or as she intended, and having to put in effort to engage in skills for them to be effective. Patient expressed continued willfulness, although that has also "gotten a little better." Engaged in motivational interviewing and commitment strategies. Patient was able to discuss obstacles, potential future if life continues this way, and what it has cost her. Encouraged patient to continue to consider reasons for change and the important of acceptance of emotions.
Patient was alert, cooperative, and in control. Oriented x3. Casually dressed, fairly groomed. Maintained good eye contact. Speech normal in production, rate, volume, and tone. No abnormal psychomotor behavior. Mood "depressed" with congruent, tearful affect. Thought process logical, goal-directed. Thought content relevant to discussion. Expressed passive suicidal ideation ("I want to go to sleep and never wake up"), denied current intent, plan, and self-harm urges. Committed to remaining safe on the unit and informing staff if unable to manage behaviors. Denied auditory/visual hallucinations and homicidal ideation, intent, and plan. Impulse control intact. Insight and judgment fair.    Patient is known to this clinician from previous admission. Patient was admitted due to suicide attempt in which she consumed 2 glasses of wine, 0.5mg klonopin, 25mg trazodone, and 5mg melatonin. Patient stated she felt overwhelmed that day as her mother told her to get out of bed and had been "pushing me" to engage in activities all day. Patient stated she felt hopeless and that she was disappointing her family for going to bed early on Passover. She expressed that she consumed the wine and medications with ambivalent intent to either "go to sleep and not feel this way" and did not care if she  in her sleep. Patient completed behavior chain analysis and was able to complete each part accurately. Patient admitted to currently being willful, not wanting to use skills, believing they are not going to work, and "what's the point." Patient agreed to complete diary card.

## 2022-04-22 NOTE — BH INPATIENT PSYCHIATRY PROGRESS NOTE - NSICDXBHSECONDARYDX_PSY_ALL_CORE
FRANSISCO (generalized anxiety disorder)   F41.1  

## 2022-04-22 NOTE — BH INPATIENT PSYCHIATRY PROGRESS NOTE - PRN MEDS
MEDICATIONS  (PRN):  calcium carbonate    500 mG (Tums) Chewable 1 Tablet(s) Chew two times a day PRN Dyspepsia  hydrOXYzine hydrochloride 25 milliGRAM(s) Oral every 4 hours PRN Anxiety  ibuprofen  Tablet. 400 milliGRAM(s) Oral every 4 hours PRN Mild Pain (1 - 3), Moderate Pain (4 - 6)  LORazepam     Tablet 1 milliGRAM(s) Oral every 6 hours PRN Anxiety  LORazepam   Injectable 2 milliGRAM(s) IntraMuscular once PRN Agitation  

## 2022-04-22 NOTE — BH CHART NOTE - NSEVENTNOTEFT_PSY_ALL_CORE
[facility] Inpatient to ED Transfer Summary    Reason for Transfer/Medical Summary: Pt tested positive for COVID, was tested d/t complains of chills, body aches, stuffy nose. Stable on vitals. Currently at [facility] for depression and anxiety, recent suicide attempt with OD. Pt requires bed on medical floor for further psychiatric care due to COVID positive status ([facility] and [facility] no longer has COVID units).    PAST MEDICAL & SURGICAL HISTORY:  Anxiety disorder    Hyperlipidemia    Major depression        Allergies    No Known Allergies    Intolerances        MEDICATIONS  (STANDING):  atorvastatin 10 milliGRAM(s) Oral at bedtime  clonazePAM  Tablet 0.5 milliGRAM(s) Oral at bedtime  DULoxetine 90 milliGRAM(s) Oral daily  loratadine 10 milliGRAM(s) Oral daily  mirtazapine 15 milliGRAM(s) Oral at bedtime  Vestura 3MG/0.02MG TABLET 1 Tablet(s) 1 Tablet(s) Oral at bedtime    MEDICATIONS  (PRN):  calcium carbonate    500 mG (Tums) Chewable 1 Tablet(s) Chew two times a day PRN Dyspepsia  hydrOXYzine hydrochloride 25 milliGRAM(s) Oral every 4 hours PRN Anxiety  ibuprofen  Tablet. 400 milliGRAM(s) Oral every 4 hours PRN Mild Pain (1 - 3), Moderate Pain (4 - 6)  LORazepam     Tablet 1 milliGRAM(s) Oral every 6 hours PRN Anxiety  LORazepam   Injectable 2 milliGRAM(s) IntraMuscular once PRN Agitation      Vital Signs Last 24 Hrs  T(C): 36.9 (22 Apr 2022 08:19), Max: 37.1 (21 Apr 2022 20:53)  T(F): 98.4 (22 Apr 2022 08:19), Max: 98.8 (21 Apr 2022 20:53)  HR: 108 (22 Apr 2022 08:19) (108 - 108)  BP: 109/73 (22 Apr 2022 08:19) (109/73 - 109/73)  BP(mean): --  RR: --  SpO2: --  CAPILLARY BLOOD GLUCOSE            PHYSICAL EXAM:  GENERAL: NAD, well-developed  HEAD:  Atraumatic, Normocephalic  EYES: EOMI, PERRLA, conjunctiva and sclera clear  NECK: Supple, No JVD  CHEST/LUNG: Clear to auscultation bilaterally; No wheeze  HEART: Regular rate and rhythm; No murmurs, rubs, or gallops  ABDOMEN: Soft, Nontender, Nondistended; Bowel sounds present  EXTREMITIES:  2+ Peripheral Pulses, No clubbing, cyanosis, or edema  PSYCH: AAOx3  NEUROLOGY: non-focal  SKIN: No rashes or lesions    LABS:                    Psychiatry Section:  Psychiatric Summary/[facility] admitting diagnosis: MDD, anxiety    Psychiatric Recommendations:  1. Admission status  9.13 (Voluntary)    2. Significant lab findings  COVID PCR positive today 4/22/22    3. Psychotropic medications  - Duloxetine 90 mg daily (depression)  	- Home medication  - Clonazepam 0.5 mg QHS (insomnia)  	- Home medication  - Titrate Mirtazapine to 15 mg QHS (insomnia)  	- Home medication, started 4/20, increased to 15mg 4/22    4. Medical conditions, other medications, consults  - Atorvastatin 10 mg QHS (dyslipidemia)  - Loratadine 10 mg daily (allergies)  - Management of COVID related symptoms if they further develop      Observation status (check one):   (x) Constant Observation  ( ) Enhanced care  ( ) Routine checks    Risk Status (check all that apply if present):  (x) at risk for suicide/self-injury  ( ) at risk for aggressive behavior  ( ) at risk for elopement  ( ) other risk:

## 2022-04-22 NOTE — BH PSYCHOLOGY - CLINICIAN PSYCHOTHERAPY NOTE - NSHPLANGLIMITEDENGLISH_GEN_A_CORE
No
No
Pt with a history of a chronic wound reinjured now with increased pain & yellow drainage. Appears infected. Not febrile. Plan: labs and IV antibiotics.

## 2022-04-22 NOTE — ED ADULT NURSE NOTE - ISOLATION TYPE:
Doing well.  No complaints.  Reports fetal movement.  Denies contractions, leakage of fluid, vaginal bleeding.  VTX.  SVE C/T/H.  Reviewed new hospital/clinic policies and procedures.  Also discussed recommendations regarding COVID.  All questions answered.  She will check BP at home next week.  ED precautions reviewed.  RTC in 2 weeks or sooner if needed.     Airborne+Contact precautions

## 2022-04-22 NOTE — BH INPATIENT PSYCHIATRY PROGRESS NOTE - CURRENT MEDICATION
MEDICATIONS  (STANDING):  atorvastatin 10 milliGRAM(s) Oral at bedtime  clonazePAM  Tablet 0.5 milliGRAM(s) Oral at bedtime  DULoxetine 90 milliGRAM(s) Oral daily  loratadine 10 milliGRAM(s) Oral daily  mirtazapine 7.5 milliGRAM(s) Oral at bedtime  Vestura 3MG/0.02MG TABLET 1 Tablet(s) 1 Tablet(s) Oral at bedtime    MEDICATIONS  (PRN):  calcium carbonate    500 mG (Tums) Chewable 1 Tablet(s) Chew two times a day PRN Dyspepsia  hydrOXYzine hydrochloride 25 milliGRAM(s) Oral every 4 hours PRN Anxiety  ibuprofen  Tablet. 400 milliGRAM(s) Oral every 4 hours PRN Mild Pain (1 - 3), Moderate Pain (4 - 6)  LORazepam     Tablet 1 milliGRAM(s) Oral every 6 hours PRN Anxiety  LORazepam   Injectable 2 milliGRAM(s) IntraMuscular once PRN Agitation  
MEDICATIONS  (STANDING):  atorvastatin 10 milliGRAM(s) Oral at bedtime  clonazePAM  Tablet 0.5 milliGRAM(s) Oral at bedtime  DULoxetine 90 milliGRAM(s) Oral daily  loratadine 10 milliGRAM(s) Oral daily  mirtazapine 7.5 milliGRAM(s) Oral at bedtime  olsen 3 mg-0.02 mg tablet 2 Tablet(s) 2 Tablet(s) Oral at bedtime  Vestura 3MG/0.02MG TABLET 1 Tablet(s) 1 Tablet(s) Oral at bedtime    MEDICATIONS  (PRN):  calcium carbonate    500 mG (Tums) Chewable 1 Tablet(s) Chew two times a day PRN Dyspepsia  hydrOXYzine hydrochloride 25 milliGRAM(s) Oral every 4 hours PRN Anxiety  ibuprofen  Tablet. 400 milliGRAM(s) Oral every 4 hours PRN Mild Pain (1 - 3), Moderate Pain (4 - 6)  LORazepam     Tablet 1 milliGRAM(s) Oral every 6 hours PRN Anxiety  LORazepam   Injectable 2 milliGRAM(s) IntraMuscular once PRN Agitation  
MEDICATIONS  (STANDING):  atorvastatin 10 milliGRAM(s) Oral at bedtime  clonazePAM  Tablet 0.5 milliGRAM(s) Oral at bedtime  DULoxetine 90 milliGRAM(s) Oral daily  loratadine 10 milliGRAM(s) Oral daily  mirtazapine 15 milliGRAM(s) Oral at bedtime  Vestura 3MG/0.02MG TABLET 1 Tablet(s) 1 Tablet(s) Oral at bedtime    MEDICATIONS  (PRN):  calcium carbonate    500 mG (Tums) Chewable 1 Tablet(s) Chew two times a day PRN Dyspepsia  hydrOXYzine hydrochloride 25 milliGRAM(s) Oral every 4 hours PRN Anxiety  ibuprofen  Tablet. 400 milliGRAM(s) Oral every 4 hours PRN Mild Pain (1 - 3), Moderate Pain (4 - 6)  LORazepam     Tablet 1 milliGRAM(s) Oral every 6 hours PRN Anxiety  LORazepam   Injectable 2 milliGRAM(s) IntraMuscular once PRN Agitation  
MEDICATIONS  (STANDING):  atorvastatin 10 milliGRAM(s) Oral at bedtime  clonazePAM  Tablet 0.5 milliGRAM(s) Oral at bedtime  DULoxetine 90 milliGRAM(s) Oral daily  loratadine 10 milliGRAM(s) Oral daily  mirtazapine 7.5 milliGRAM(s) Oral at bedtime  Vestura 3MG/0.02MG TABLET 1 Tablet(s) 1 Tablet(s) Oral at bedtime    MEDICATIONS  (PRN):  calcium carbonate    500 mG (Tums) Chewable 1 Tablet(s) Chew two times a day PRN Dyspepsia  hydrOXYzine hydrochloride 25 milliGRAM(s) Oral every 4 hours PRN Anxiety  ibuprofen  Tablet. 400 milliGRAM(s) Oral every 4 hours PRN Mild Pain (1 - 3), Moderate Pain (4 - 6)  LORazepam     Tablet 1 milliGRAM(s) Oral every 6 hours PRN Anxiety  LORazepam   Injectable 2 milliGRAM(s) IntraMuscular once PRN Agitation

## 2022-04-22 NOTE — BH PSYCHOLOGY - GROUP THERAPY NOTE - NSPSYCHOLGRPBILLING_PSY_A_CORE
[id number] - Group Psychotherapy
[id number] - Group Psychotherapy
[id number] - Group Psychotherapy
[id number] - Group Psychotherapy

## 2022-04-22 NOTE — BH INPATIENT PSYCHIATRY PROGRESS NOTE - NSTXSUICIDGOAL_PSY_ALL_CORE
Talk to staff and ask for assistance when having suicidal wishes instead of acting out

## 2022-04-22 NOTE — BH PSYCHOLOGY - GROUP THERAPY NOTE - NSPSYCHOLGRPDBTGOAL_PSY_A_CORE
reduce mood and affective lability/reduce suicidal ideation/risk of attempt/improve ability to indentify feelings/improve ability to communicate feelings/improve ability re: assertive/set limits

## 2022-04-22 NOTE — ED ADULT NURSE NOTE - CHIEF COMPLAINT QUOTE
brought from [facility] pt was in [facility] for anxiety

## 2022-04-22 NOTE — ED ADULT TRIAGE NOTE - CHIEF COMPLAINT QUOTE
brought from [facility] pt was in [facility] for anxiety

## 2022-04-22 NOTE — BH INPATIENT PSYCHIATRY PROGRESS NOTE - NSBHMETABOLIC_PSY_ALL_CORE_FT
BMI: BMI (kg/m2): 27.3 (04-18-22 @ 16:29)  HbA1c: A1C with Estimated Average Glucose Result: 4.6 % (04-19-22 @ 11:13)    Glucose: POCT Blood Glucose.: 122 mg/dL (04-17-22 @ 16:05)    BP: 109/73 (04-22-22 @ 08:19) (109/73 - 118/63)  Lipid Panel: Date/Time: 04-19-22 @ 11:13  Cholesterol, Serum: 184  Direct LDL: --  HDL Cholesterol, Serum: 70  Total Cholesterol/HDL Ration Measurement: --  Triglycerides, Serum: 135  
BMI: BMI (kg/m2): 27.3 (04-18-22 @ 16:29)  HbA1c: A1C with Estimated Average Glucose Result: 4.6 % (04-19-22 @ 11:13)    Glucose: POCT Blood Glucose.: 122 mg/dL (04-17-22 @ 16:05)    BP: 118/78 (04-18-22 @ 15:23) (118/78 - 118/78)  Lipid Panel: Date/Time: 04-19-22 @ 11:13  Cholesterol, Serum: 184  Direct LDL: --  HDL Cholesterol, Serum: 70  Total Cholesterol/HDL Ration Measurement: --  Triglycerides, Serum: 135  
BMI: BMI (kg/m2): 27.3 (04-18-22 @ 16:29)  HbA1c: A1C with Estimated Average Glucose Result: 4.6 % (04-19-22 @ 11:13)    Glucose: POCT Blood Glucose.: 122 mg/dL (04-17-22 @ 16:05)    BP: 118/78 (04-18-22 @ 15:23) (118/78 - 118/78)  Lipid Panel: Date/Time: 04-19-22 @ 11:13  Cholesterol, Serum: 184  Direct LDL: --  HDL Cholesterol, Serum: 70  Total Cholesterol/HDL Ration Measurement: --  Triglycerides, Serum: 135  
BMI: BMI (kg/m2): 27.3 (04-18-22 @ 16:29)  HbA1c: A1C with Estimated Average Glucose Result: 4.6 % (04-19-22 @ 11:13)    Glucose: POCT Blood Glucose.: 122 mg/dL (04-17-22 @ 16:05)    BP: 118/63 (04-21-22 @ 08:01) (118/63 - 118/78)  Lipid Panel: Date/Time: 04-19-22 @ 11:13  Cholesterol, Serum: 184  Direct LDL: --  HDL Cholesterol, Serum: 70  Total Cholesterol/HDL Ration Measurement: --  Triglycerides, Serum: 135

## 2022-04-22 NOTE — ED PROVIDER NOTE - CLINICAL SUMMARY MEDICAL DECISION MAKING FREE TEXT BOX
[place], PGY3 - 21F transferred from [facility] for medical admission in setting of COVID+ status with 3 days of symptoms. Well-appearing, O2 sat 100% on ra at rest and with ambulation, does not appear in respiratory distress. No SI/HI. Plan for COVID marker labs, cxr, admit

## 2022-04-22 NOTE — BH INPATIENT PSYCHIATRY PROGRESS NOTE - NSBHFUPINTERVALCCFT_PSY_A_CORE
Patient seen for follow up for depression and anxiety.
f/u depression and anxiety
Patient seen for follow up for depression and anxiety.
Patient seen for follow up for depression and anxiety.

## 2022-04-22 NOTE — BH INPATIENT PSYCHIATRY PROGRESS NOTE - NSICDXBHPRIMARYDX_PSY_ALL_CORE
Major depressive disorder   F32.9  

## 2022-04-22 NOTE — BH PSYCHOLOGY - CLINICIAN PSYCHOTHERAPY NOTE - NSTXSUICIDGOAL_PSY_ALL_CORE
Talk to staff and ask for assistance when having suicidal wishes instead of acting out
Talk to staff and ask for assistance when having suicidal wishes instead of acting out

## 2022-04-22 NOTE — BH INPATIENT PSYCHIATRY PROGRESS NOTE - NSDCCRITERIA_PSY_ALL_CORE
symptom management, safety planning, care coordination 

## 2022-04-22 NOTE — BH INPATIENT PSYCHIATRY PROGRESS NOTE - NSBHASSESSSUMMFT_PSY_ALL_CORE
Patient is a 21-year-old female with a history of major depressive disorder, generalized anxiety disorder who presents to the hospital for a suicide attempt by overdose on 4-6 ounces of alcohol and half a milligram of Klonopin. The patient has been psychiatrically hospitalized three times in the past six months, and was recently discharged last week, currently participating in partial hospitalization. The patient has had numerous failed medication trials. The normal course of her illness can be characterized by rapid decompensation, but also rapid improvement after hospitalization. She meets criteria for major depressive disorder with anxious distress. At present pt continues to report improved mood and denies SIIP or urges to self-harm.    PsyHx: Sees Dr. Morataya x 3 years. Therapist Aida Martines 1x hospitalization 1 month ago at [facility].  SH: Underghospitals at [facility]. Lives with parents.    1. Admission status  9.13 (Voluntary)    2. Significant lab findings  None    3. Psychotropic medications  - Duloxetine 90 mg daily (depression)  	- Home medication  - Clonazepam 0.5 mg QHS (insomnia)  	- Home medication  - Titrate Mirtazapine to 15 mg QHS (insomnia)  	- Home medication, started 4/20, increased to 15mg 4/22    4. Medical conditions, other medications, consults  - Atorvastatin 10 mg QHS (dyslipidemia)  - Loratadine 10 mg daily (allergies)  - Given report of URI symptoms, will get RVP    5. Psychosocial interventions  - Family contacted: Contacted father 4/21

## 2022-04-22 NOTE — ED PROVIDER NOTE - NS ED ROS FT
General: +Subjective fevers, chills  Eyes: Denies vision changes  ENMT: +sore throat, congestion  CV: Denies chest pain  Resp: +Cough. Denies SOB  GI: +Diarrhea. Denies abdominal pain, nausea, vomiting  : Denies painful urination  Skin: Denies new rashes  Neuro: Denies headache, focal weakness  MSK: Denies recent trauma  Psych: Denies SI/HI

## 2022-04-22 NOTE — BH INPATIENT PSYCHIATRY PROGRESS NOTE - NSBHCHARTREVIEWVS_PSY_A_CORE FT
Vital Signs Last 24 Hrs  T(C): 36.5 (04-18-22 @ 18:33), Max: 37.4 (04-18-22 @ 15:23)  T(F): 97.7 (04-18-22 @ 18:33), Max: 99.4 (04-18-22 @ 15:23)  HR: 104 (04-18-22 @ 15:23) (104 - 104)  BP: 118/78 (04-18-22 @ 15:23) (118/78 - 118/78)  BP(mean): --  RR: 18 (04-18-22 @ 15:23) (18 - 18)  SpO2: 100% (04-18-22 @ 15:23) (100% - 100%)    
Vital Signs Last 24 Hrs  T(C): 36.3 (04-19-22 @ 21:28), Max: 36.3 (04-19-22 @ 21:28)  T(F): 97.3 (04-19-22 @ 21:28), Max: 97.3 (04-19-22 @ 21:28)  HR: --  BP: --  BP(mean): --  RR: --  SpO2: --    
Vital Signs Last 24 Hrs  T(C): 36.9 (04-22-22 @ 08:19), Max: 37.1 (04-21-22 @ 20:53)  T(F): 98.4 (04-22-22 @ 08:19), Max: 98.8 (04-21-22 @ 20:53)  HR: 108 (04-22-22 @ 08:19) (108 - 108)  BP: 109/73 (04-22-22 @ 08:19) (109/73 - 109/73)  BP(mean): --  RR: --  SpO2: --    
Vital Signs Last 24 Hrs  T(C): 37 (04-21-22 @ 08:01), Max: 37 (04-21-22 @ 08:01)  T(F): 98.6 (04-21-22 @ 08:01), Max: 98.6 (04-21-22 @ 08:01)  HR: 88 (04-21-22 @ 08:01) (88 - 88)  BP: 118/63 (04-21-22 @ 08:01) (118/63 - 118/63)  BP(mean): --  RR: --  SpO2: --

## 2022-04-22 NOTE — ED PROVIDER NOTE - ATTENDING CONTRIBUTION TO CARE
Attending note:   After face to face evaluation of this patient, I concur with above noted hx, pe, and care plan for this patient.  Steele: 21 yof anxiety and depression admitted to [facility] 4 days ago for suicidal ideation. Pt had negative covid 4 days ago. Pt noted congestion cough with out sputum and body aches for three days. Tested positive for covid today. Pt complaining of body aches now. Pt is well appearing, HR 91 in room, not dehydrated, clear lungs, RRR, abd soft and non tender, no edema, pulses normal in all ext.   Pt is covid positive today with unremarkable labs today and cannot be isolated at [facility] so will require admission, NSAIDs for bodyaches.

## 2022-04-22 NOTE — ED PROVIDER NOTE - PHYSICAL EXAMINATION
I have reviewed the triage vital signs.  Const: AAOx3, in NAD  Eyes: no conjunctival injection  HENT: NCAT, Neck supple, oral mucosa moist  CV: tachycardic, +S1, S2  Resp: CTAB, no respiratory distress, O2 saturation 100% on RA at rest and with ambulation  GI: Abdomen soft, NTND, no guarding  : no CVA tenderness  Extremities: No peripheral edema  Skin: Warm, well perfused, no rash  MSK: No gross deformities appreciated  Neuro: No focal sensory or motor deficits  Psych: Appropriate mood and affect

## 2022-04-22 NOTE — BH INPATIENT PSYCHIATRY PROGRESS NOTE - NSBHFUPINTERVALHXFT_PSY_A_CORE
No acute events overnight. Pt compliant with meds aside from Klonopin. Reports poor sleep which she relates to URI symptoms she is experiencing. Reports improved mood today and denies SIIP. Denies urges to self-harm. Remains in good behavioral control. Has been engaged in unit milieu, attending groups, and social with peers. Tending to ADLs appropriately. Reports ongoing intermittent intrusive/OCD-like thoughts and picking at skin around fingers.

## 2022-04-22 NOTE — BH PSYCHOLOGY - CLINICIAN PSYCHOTHERAPY NOTE - TOKEN PULL-DIAGNOSIS
Primary Diagnosis:  Major depressive disorder [F32.9]        Problem Dx:   FRANSISCO (generalized anxiety disorder) [F41.1]      
Primary Diagnosis:  Major depressive disorder [F32.9]        Problem Dx:   FRANSISCO (generalized anxiety disorder) [F41.1]

## 2022-04-22 NOTE — BH PSYCHOLOGY - GROUP THERAPY NOTE - TOKEN PULL-DIAGNOSIS
Primary Diagnosis:  Major depressive disorder [F32.9]        Problem Dx:   FRANSISCO (generalized anxiety disorder) [F41.1]      

## 2022-04-22 NOTE — ED ADULT NURSE NOTE - OBJECTIVE STATEMENT
pt is a 21 yr old female sent from [facility] with positive covid, cough and sore throat. #22 piv placed in left ac, labs sent, ekg done. pt denies si/ hi. [facility] staff with pt.

## 2022-04-22 NOTE — BH PSYCHOLOGY - GROUP THERAPY NOTE - NSPSYCHOLGRPDBTPT_PSY_A_CORE FT
DBT Group is a group in which patients learn skills to better manage their emotions and behaviors. Group begins with a mindfulness practice so that patients have an opportunity to practice observing their internal and external experiences.  Following the mindfulness exercise the group learns new skills in a didactic format. Group today focused on the “emotion regulation” module.  Specifically, patients learned the skills of “PLEASE,” or taking care of the mind by taking care of the body. This skill involves maintaining consistent treatment for physical illness, balanced eating, avoidance of mood-altering substances, balanced sleep, and exercise.  provided examples and suggestions of ways to improve these areas, and patients were encouraged to engage in discussion of ways they can prioritize and implement this skill.
DBT skills generalization group is a group in which patients review the skill taught the day before, and patients have the opportunity to troubleshoot the skill, engage in more practice, and share their experience using the skill. Today’s skills review group focused on the skills of “radical acceptance” and "willingness" which include accepting painful situations in their lives they cannot change through turning the mind and practicing engaging in reality effectively. Patients were asked to determine difficult situations in their lives they needed to work on accepting, and provided examples of ways to practice this while in the hospital.  
DBT Group is a group in which patients learn skills to better manage their emotions and behaviors. Group begins with a mindfulness practice so that patients have an opportunity to practice observing their internal and external experiences. Following the mindfulness exercise the group learns new skills in a didactic format. Group today focused on the “distress tolerance” module, which are skills to help patients manage their crisis urges. Specifically, patients learned “TIPP” skills, which are skills to use when patients are highly distressed (at least 70/100) and are unable to think effectively to use other skills. These skills involve “Tipping” body chemistry through using temperature, intense exercise, paced breathing and progressive muscle relaxation. Each skill was reviewed and patients practiced progressive muscle relaxation and paced breathing in the session. Patients were encouraged to practice these skills while on the unit.
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
## 2022-04-23 DIAGNOSIS — E78.5 HYPERLIPIDEMIA, UNSPECIFIED: ICD-10-CM

## 2022-04-23 DIAGNOSIS — S82.892A OTHER FRACTURE OF LEFT LOWER LEG, INITIAL ENCOUNTER FOR CLOSED FRACTURE: Chronic | ICD-10-CM

## 2022-04-23 DIAGNOSIS — F41.9 ANXIETY DISORDER, UNSPECIFIED: ICD-10-CM

## 2022-04-23 DIAGNOSIS — F32.9 MAJOR DEPRESSIVE DISORDER, SINGLE EPISODE, UNSPECIFIED: ICD-10-CM

## 2022-04-23 DIAGNOSIS — Z29.9 ENCOUNTER FOR PROPHYLACTIC MEASURES, UNSPECIFIED: ICD-10-CM

## 2022-04-23 DIAGNOSIS — U07.1 COVID-19: ICD-10-CM

## 2022-04-23 LAB
ALBUMIN SERPL ELPH-MCNC: 3.7 G/DL — SIGNIFICANT CHANGE UP (ref 3.3–5)
ALP SERPL-CCNC: 50 U/L — SIGNIFICANT CHANGE UP (ref 40–120)
ALT FLD-CCNC: 22 U/L — SIGNIFICANT CHANGE UP (ref 4–33)
ANION GAP SERPL CALC-SCNC: 13 MMOL/L — SIGNIFICANT CHANGE UP (ref 7–14)
AST SERPL-CCNC: 18 U/L — SIGNIFICANT CHANGE UP (ref 4–32)
BILIRUB SERPL-MCNC: 0.5 MG/DL — SIGNIFICANT CHANGE UP (ref 0.2–1.2)
BUN SERPL-MCNC: 8 MG/DL — SIGNIFICANT CHANGE UP (ref 7–23)
CALCIUM SERPL-MCNC: 8.8 MG/DL — SIGNIFICANT CHANGE UP (ref 8.4–10.5)
CHLORIDE SERPL-SCNC: 104 MMOL/L — SIGNIFICANT CHANGE UP (ref 98–107)
CHOLEST SERPL-MCNC: 140 MG/DL — SIGNIFICANT CHANGE UP
CO2 SERPL-SCNC: 22 MMOL/L — SIGNIFICANT CHANGE UP (ref 22–31)
CREAT SERPL-MCNC: 0.61 MG/DL — SIGNIFICANT CHANGE UP (ref 0.5–1.3)
EGFR: 130 ML/MIN/1.73M2 — SIGNIFICANT CHANGE UP
GLUCOSE SERPL-MCNC: 81 MG/DL — SIGNIFICANT CHANGE UP (ref 70–99)
HCT VFR BLD CALC: 39.9 % — SIGNIFICANT CHANGE UP (ref 34.5–45)
HDLC SERPL-MCNC: 63 MG/DL — SIGNIFICANT CHANGE UP
HGB BLD-MCNC: 13.7 G/DL — SIGNIFICANT CHANGE UP (ref 11.5–15.5)
LIPID PNL WITH DIRECT LDL SERPL: 48 MG/DL — SIGNIFICANT CHANGE UP
MAGNESIUM SERPL-MCNC: 2 MG/DL — SIGNIFICANT CHANGE UP (ref 1.6–2.6)
MCHC RBC-ENTMCNC: 30.2 PG — SIGNIFICANT CHANGE UP (ref 27–34)
MCHC RBC-ENTMCNC: 34.3 GM/DL — SIGNIFICANT CHANGE UP (ref 32–36)
MCV RBC AUTO: 87.9 FL — SIGNIFICANT CHANGE UP (ref 80–100)
NON HDL CHOLESTEROL: 77 MG/DL — SIGNIFICANT CHANGE UP
NRBC # BLD: 0 /100 WBCS — SIGNIFICANT CHANGE UP
NRBC # FLD: 0 K/UL — SIGNIFICANT CHANGE UP
PHOSPHATE SERPL-MCNC: 4 MG/DL — SIGNIFICANT CHANGE UP (ref 2.5–4.5)
PLATELET # BLD AUTO: 125 K/UL — LOW (ref 150–400)
POTASSIUM SERPL-MCNC: 3.6 MMOL/L — SIGNIFICANT CHANGE UP (ref 3.5–5.3)
POTASSIUM SERPL-SCNC: 3.6 MMOL/L — SIGNIFICANT CHANGE UP (ref 3.5–5.3)
PROT SERPL-MCNC: 6.9 G/DL — SIGNIFICANT CHANGE UP (ref 6–8.3)
RBC # BLD: 4.54 M/UL — SIGNIFICANT CHANGE UP (ref 3.8–5.2)
RBC # FLD: 12.6 % — SIGNIFICANT CHANGE UP (ref 10.3–14.5)
SODIUM SERPL-SCNC: 139 MMOL/L — SIGNIFICANT CHANGE UP (ref 135–145)
TRIGL SERPL-MCNC: 147 MG/DL — SIGNIFICANT CHANGE UP
WBC # BLD: 3.78 K/UL — LOW (ref 3.8–10.5)
WBC # FLD AUTO: 3.78 K/UL — LOW (ref 3.8–10.5)

## 2022-04-23 PROCEDURE — 12345: CPT | Mod: NC

## 2022-04-23 PROCEDURE — 99223 1ST HOSP IP/OBS HIGH 75: CPT

## 2022-04-23 RX ORDER — DULOXETINE HYDROCHLORIDE 30 MG/1
90 CAPSULE, DELAYED RELEASE ORAL
Qty: 0 | Refills: 0 | DISCHARGE

## 2022-04-23 RX ORDER — MIRTAZAPINE 45 MG/1
15 TABLET, ORALLY DISINTEGRATING ORAL AT BEDTIME
Refills: 0 | Status: DISCONTINUED | OUTPATIENT
Start: 2022-04-23 | End: 2022-05-03

## 2022-04-23 RX ORDER — DULOXETINE HYDROCHLORIDE 30 MG/1
90 CAPSULE, DELAYED RELEASE ORAL DAILY
Refills: 0 | Status: DISCONTINUED | OUTPATIENT
Start: 2022-04-23 | End: 2022-05-03

## 2022-04-23 RX ORDER — HYDROXYZINE HCL 10 MG
25 TABLET ORAL EVERY 6 HOURS
Refills: 0 | Status: DISCONTINUED | OUTPATIENT
Start: 2022-04-23 | End: 2022-05-03

## 2022-04-23 RX ORDER — CLONAZEPAM 1 MG
0.5 TABLET ORAL DAILY
Refills: 0 | Status: DISCONTINUED | OUTPATIENT
Start: 2022-04-23 | End: 2022-04-23

## 2022-04-23 RX ORDER — LORATADINE 10 MG/1
10 TABLET ORAL DAILY
Refills: 0 | Status: DISCONTINUED | OUTPATIENT
Start: 2022-04-23 | End: 2022-05-03

## 2022-04-23 RX ORDER — ATORVASTATIN CALCIUM 80 MG/1
10 TABLET, FILM COATED ORAL AT BEDTIME
Refills: 0 | Status: DISCONTINUED | OUTPATIENT
Start: 2022-04-23 | End: 2022-05-03

## 2022-04-23 RX ORDER — CLONAZEPAM 1 MG
0.5 TABLET ORAL AT BEDTIME
Refills: 0 | Status: DISCONTINUED | OUTPATIENT
Start: 2022-04-23 | End: 2022-04-25

## 2022-04-23 RX ORDER — DROSPIRENONE AND ETHINYL ESTRADIOL 0.03MG-3MG
1 KIT ORAL
Qty: 0 | Refills: 0 | DISCHARGE

## 2022-04-23 RX ORDER — ENOXAPARIN SODIUM 100 MG/ML
40 INJECTION SUBCUTANEOUS EVERY 24 HOURS
Refills: 0 | Status: DISCONTINUED | OUTPATIENT
Start: 2022-04-23 | End: 2022-05-03

## 2022-04-23 RX ADMIN — ENOXAPARIN SODIUM 40 MILLIGRAM(S): 100 INJECTION SUBCUTANEOUS at 17:43

## 2022-04-23 RX ADMIN — LORATADINE 10 MILLIGRAM(S): 10 TABLET ORAL at 13:13

## 2022-04-23 RX ADMIN — DULOXETINE HYDROCHLORIDE 90 MILLIGRAM(S): 30 CAPSULE, DELAYED RELEASE ORAL at 13:13

## 2022-04-23 RX ADMIN — MIRTAZAPINE 15 MILLIGRAM(S): 45 TABLET, ORALLY DISINTEGRATING ORAL at 21:35

## 2022-04-23 RX ADMIN — ATORVASTATIN CALCIUM 10 MILLIGRAM(S): 80 TABLET, FILM COATED ORAL at 21:36

## 2022-04-23 NOTE — H&P ADULT - PROBLEM SELECTOR PLAN 2
- Admitted to [facility] after suicide attempt on 4/18.   - S/p drank 6-8oz of wine and took .5mg clonopin, 25mg trazodone and 5mg melatonin on 4/16 before going to sleep. She is upset the SA was unsuccessful and told her parents on 4/17  - continue with constant observation   - psych consult in am   - denies SI/HI at this time  - maintain patient safety   - Will continue to monitor and support patient - Admitted to [facility] after suicide attempt on 4/18.   - S/p drank 6-8oz of wine and took .5mg clonopin, 25mg trazodone and 5mg melatonin on 4/16 before going to sleep. She is upset the SA was unsuccessful and told her parents on 4/17  - denies SI/HI at this time  - maintain patient safety w/1:1  - Will continue to monitor and support patient  - continue with Cymbalta 90mg, Remeron, and clonazepam as prescribed at [facility]  - Psych consult in am

## 2022-04-23 NOTE — BH DISCHARGE NOTE NURSING/SOCIAL WORK/PSYCH REHAB - PATIENT PORTAL LINK FT
You can access the FollowMyHealth Patient Portal offered by [facility] by registering at the following website: [email or web address]. By joining [employer]’s FollowMyHealth portal, you will also be able to view your health information using other applications (apps) compatible with our system.

## 2022-04-23 NOTE — BH CONSULTATION LIAISON ASSESSMENT NOTE - CURRENT MEDICATION
MEDICATIONS  (STANDING):  atorvastatin 10 milliGRAM(s) Oral at bedtime  clonazePAM  Tablet 0.5 milliGRAM(s) Oral at bedtime  DULoxetine 90 milliGRAM(s) Oral daily  enoxaparin Injectable 40 milliGRAM(s) SubCutaneous every 24 hours  loratadine 10 milliGRAM(s) Oral daily  mirtazapine 15 milliGRAM(s) Oral at bedtime    MEDICATIONS  (PRN):  acetaminophen     Tablet .. 650 milliGRAM(s) Oral every 6 hours PRN Temp greater or equal to 38C (100.4F), Mild Pain (1 - 3)  benzonatate 200 milliGRAM(s) Oral every 8 hours PRN Cough  melatonin 6 milliGRAM(s) Oral at bedtime PRN Insomnia

## 2022-04-23 NOTE — BH CONSULTATION LIAISON ASSESSMENT NOTE - CASE SUMMARY
Chart reviewed. Pt seen virtually via NP. Pt was AA O x 3, in good behavioral control, reporting mild URI symptoms. Denied active SI with intent/plan or safety concerns. Agreed with continuing psych care in whatever fashion possible while at [facility]. Agree with above assessment and recs.

## 2022-04-23 NOTE — BH CONSULTATION LIAISON ASSESSMENT NOTE - NSBHCHARTREVIEWVS_PSY_A_CORE FT
Vital Signs Last 24 Hrs  T(C): 36.7 (23 Apr 2022 06:37), Max: 37.2 (22 Apr 2022 22:04)  T(F): 98 (23 Apr 2022 06:37), Max: 99 (22 Apr 2022 22:04)  HR: 82 (23 Apr 2022 06:37) (82 - 117)  BP: 103/65 (23 Apr 2022 06:37) (103/65 - 119/87)  BP(mean): --  RR: 16 (23 Apr 2022 06:37) (16 - 18)  SpO2: 97% (23 Apr 2022 06:37) (97% - 100%)

## 2022-04-23 NOTE — H&P ADULT - NSHPSOCIALHISTORY_GEN_ALL_CORE
Tobacco Usage:  (x ) never smoked, denies marijuana use   Alcohol Usage: (x ) none   Recreational drugs (x ) None  Lives with parents; currently resident at [facility] since Monday   Occupation: student; not enrolled in college at this time   Denies Recent travel Tobacco Usage:  (x ) never smoked, denies marijuana use   Alcohol Usage: (x ) none   Recreational drugs (x ) None  Lives with parents; currently resident at [facility] since Monday   Occupation: student; withdrew from college classes at this time   Denies Recent travel

## 2022-04-23 NOTE — BH CONSULTATION LIAISON ASSESSMENT NOTE - SUMMARY
"Patient is a 21-year-old female, single, non-caregiver residing with her parents, currently taking a leave of absence from [employer], with PPhx anxiety, panic and depression with long hx of outpatient treatment (currently in treatment with ELIDA Mccann), no history of violence, no legal involvement, no self-injurious behaviors, no suicide attempts, 2 prior psychiatric hospitalization recently at [facility] from 2/28-3/9/22 and 3/31-4/12 no access to guns/weapons, who presented to partial appointment on 4/18 after SA via alcohol and pills on 4/16. Patient brought over for voluntary admission." Sent to [facility] for COVID + test result.    PLAN  - continue Duloxetine 90 mg daily (depression) - Home medication  -  STOP Clonazepam 0.5 mg QHS (insomnia) - Home medication - patient has been refusing this medication at [facility]. will monitor sleep/anxiety whil medicalyl admitted  - continue Mirtazapine to 15 mg QHS (insomnia) - Home medication, started 4/20, increased to 15mg 4/22  - patient on CO for recent SA  - PRN for anxiety: vistaril 25mg q6hrs   - dispo pending as patient COVID + and requires isolation    "Patient is a 21-year-old female, single, non-caregiver residing with her parents, currently taking a leave of absence from [employer], with PPhx anxiety, panic and depression with long hx of outpatient treatment (currently in treatment with ELIDA Mccann), no history of violence, no legal involvement, no self-injurious behaviors, no suicide attempts, 2 prior psychiatric hospitalization recently at [facility]S from 2/28-3/9/22 and 3/31-4/12 no access to guns/weapons, who presented to partial appointment on 4/18 after SA via alcohol and pills on 4/16. Patient brought over for voluntary admission." Sent to [facility] for COVID + test result.    PLAN  - continue Duloxetine 90 mg daily (depression) - Home medication  -  STOP Clonazepam 0.5 mg QHS (insomnia) - Home medication - patient has been refusing this medication at [facility]. will monitor sleep/anxiety whil medicalyl admitted  - continue Mirtazapine 15 mg QHS (insomnia) - Home medication, started 4/20, increased to 15mg 4/22  - patient on CO for recent SA  - PRN for anxiety: vistaril 25mg q6hrs   - dispo pending as patient COVID + and requires isolation

## 2022-04-23 NOTE — BH DISCHARGE NOTE NURSING/SOCIAL WORK/PSYCH REHAB - NSCDUDCCRISIS_PSY_A_CORE
[facility] Well  1 (711) [facility]-WELL ([phone])  Text "WELL" to [phone]  Website: www.[employer]/.[employer] 1 (699) 101-ZSIO (9356) Website: www.safehorizon.org/.National Suicide Prevention Lifeline 0 (577) 096-1654/.  [employer]  1 (579) LIFENET (772-0362)/.  [employer]  [address] [place] [address] [phone]   Hours:  Monday through Friday from 9 AM to 3 PM/.  [place] Dept of  Affairs - Veterans Crisis Line  0 (624) 654-7324, Option 1

## 2022-04-23 NOTE — H&P ADULT - NSICDXFAMILYHX_GEN_ALL_CORE_FT
FAMILY HISTORY:  Father  Still living? Yes, Estimated age: Age Unknown  Family history of early CAD, Age at diagnosis: Age Unknown

## 2022-04-23 NOTE — PATIENT PROFILE ADULT - HOME ACCESSIBILITY CONCERNS
Medication being requested: Oxycodone  Last visit date: 3/9/21.  Provider: BE  Next visit date: 5/5/21.  Provider: BE  Expected follow up: 8-12 weeks  MTM visit ([facility]) date: No  UDT date: 11/2020  Agreement date: 11/2020   (Last fill date; name; strength; provider; MME; quantity):  03/25/2021 Oxycodone Hcl 20 Mg Tablet Qty: 84 for 14 days Br Massiel  Pertinent between visit information about requested medication (telephone, mychart, prior authorization, concerns, comments): No  Script being sent to provider by nurse- dates and quantity:   4/8/21-4/22/21  Requested Prescriptions     Pending Prescriptions Disp Refills     Oxycodone HCl 20 mg Tab 84 each 0     Sig: Take 20 mg by mouth every 4 (four) hours for 14 days.     Pharmacy cued: Мария  Standing orders for withdrawal protocol implemented: ROBB     none

## 2022-04-23 NOTE — PATIENT PROFILE ADULT - IS THERE A SUSPICION OF ABUSE/NEGLIGENCE?
----- Message from Sariah Mejia MD sent at 1/13/2022 10:29 AM CST -----  Schedule primary LTCS  Dx- previous 4th degree tear  May 5th at 7 am   no

## 2022-04-23 NOTE — H&P ADULT - NS ATTEND AMEND GEN_ALL_CORE FT
21F w/anxiety/depression, HLD, presenting from [facility] for recent suicide attempt due to (+)COVID and [facility] unable to isolate patients at present. f/u official read of CXR, SpO2 in acceptable range, no need for steroids/remdesivir at present. Isolation precautions, enoxaparin for DVT ppx joe in setting of OCP use. Inflammatory markers as above. c/w meds from [facility]. CL consult in AM. 21F w/anxiety/depression, HLD, presenting from [facility] for recent suicide attempt due to (+)COVID and [facility] unable to isolate patients at present. f/u official read of CXR, SpO2 in acceptable range, no need for steroids/remdesivir at present. Isolation precautions, enoxaparin for DVT ppx joe in setting of OCP use. Inflammatory markers as above. c/w meds from [facility]. CL consult in AM. Denies current SI/HI, c/w 1:1 per psych recs.

## 2022-04-23 NOTE — H&P ADULT - PROBLEM SELECTOR PLAN 1
- COVID-19 PCR: Positive  - Isolation precautions per hospital protocol   - Patient with upper respiratory symptoms including sore throat, rhinorrhea, nasal congestion, mild HA, malaise and body aches.   - CXR with clear lungs   - Pt currently on RA sating > 95%   - Inflammatory markers unremarkable; ddimer, ESR, procal, LDH and Ferritin within normal limits. CRP elevated to 28  - monitor SP02 on continuous pulse ox  - Incentive spirometry  - chest PT PRN  - tessalon pearls prn for cough  - Tylenol prn - COVID-19 PCR: Positive  - Isolation precautions per hospital protocol   - Patient with upper respiratory symptoms including sore throat, rhinorrhea, nasal congestion, mild HA, malaise and body aches.   - CXR with clear lungs   - Pt currently on RA sating > 95%   - Inflammatory markers unremarkable; ddimer, ESR, procal, LDH and Ferritin within normal limits. CRP elevated to 28  - monitor SP02 q 8 hours   - Incentive spirometry  - chest PT PRN  - tessalon pearls prn for cough  - Tylenol prn - COVID-19 PCR: Positive, unable to stay at [facility] due to (+)COVID, transferred to [facility]  - Isolation precautions per hospital protocol   - Patient with upper respiratory symptoms including sore throat, rhinorrhea, nasal congestion, mild HA, malaise and body aches.   - CXR with clear lungs   - Pt currently on RA sating > 95%   - Inflammatory markers unremarkable; d-dimer, procal, and Ferritin within normal limits. CRP elevated to 20.8  - monitor SP02 q 8 hours   - Incentive spirometry  - tessalon pearls prn for cough  - Tylenol prn

## 2022-04-23 NOTE — PATIENT PROFILE ADULT - FALL HARM RISK - HARM RISK INTERVENTIONS

## 2022-04-23 NOTE — PROGRESS NOTE ADULT - SUBJECTIVE AND OBJECTIVE BOX
[facility] Division of Hospital Medicine  Almas Grissom MD  Pager (M-F, 8A-5P): [phone]      SUBJECTIVE / OVERNIGHT EVENTS: NAEON. Patient notes that she is comfortable today. Noted nasal congestion, cough, chills, and night sweats. Denies Fever/HA/N/V/D/C. denies SI/HI. Wanted to know if she can have PRN anxiety meds in case she gets anxious. Seen by Psych Recs appreciated    MEDICATIONS  (STANDING):  atorvastatin 10 milliGRAM(s) Oral at bedtime  clonazePAM  Tablet 0.5 milliGRAM(s) Oral at bedtime  DULoxetine 90 milliGRAM(s) Oral daily  enoxaparin Injectable 40 milliGRAM(s) SubCutaneous every 24 hours  loratadine 10 milliGRAM(s) Oral daily  mirtazapine 15 milliGRAM(s) Oral at bedtime    MEDICATIONS  (PRN):  acetaminophen     Tablet .. 650 milliGRAM(s) Oral every 6 hours PRN Temp greater or equal to 38C (100.4F), Mild Pain (1 - 3)  benzonatate 200 milliGRAM(s) Oral every 8 hours PRN Cough  melatonin 6 milliGRAM(s) Oral at bedtime PRN Insomnia      I&O's Summary      PHYSICAL EXAM:  Vital Signs Last 24 Hrs  T(C): 36.7 (23 Apr 2022 06:37), Max: 37.2 (22 Apr 2022 22:04)  T(F): 98 (23 Apr 2022 06:37), Max: 99 (22 Apr 2022 22:04)  HR: 82 (23 Apr 2022 06:37) (82 - 117)  BP: 103/65 (23 Apr 2022 06:37) (103/65 - 119/87)  BP(mean): --  RR: 16 (23 Apr 2022 06:37) (16 - 18)  SpO2: 97% (23 Apr 2022 06:37) (97% - 100%)  CONSTITUTIONAL: NAD, well-developed, well-groomed  EYES: PERRLA; conjunctiva and sclera clear  ENMT: Moist oral mucosa, no pharyngeal injection or exudates; normal dentition  NECK: Supple, no palpable masses; no thyromegaly  RESPIRATORY: Normal respiratory effort; lungs are clear to auscultation bilaterally  CARDIOVASCULAR: Regular rate and rhythm, normal S1 and S2, no murmur/rub/gallop; No lower extremity edema; Peripheral pulses are 2+ bilaterally  ABDOMEN: Nontender to palpation, normoactive bowel sounds, no rebound/guarding; No hepatosplenomegaly  MUSCULOSKELETAL:  Normal gait; no clubbing or cyanosis of digits; no joint swelling or tenderness to palpation  PSYCH: A+O to person, place, and time; affect appropriate  NEUROLOGY: CN 2-12 are intact and symmetric; no gross sensory deficits   SKIN: No rashes; no palpable lesions    LABS:                        13.7   3.78  )-----------( 125      ( 23 Apr 2022 07:02 )             39.9     04-23    139  |  104  |  8   ----------------------------<  81  3.6   |  22  |  0.61    Ca    8.8      23 Apr 2022 07:02  Phos  4.0     04-23  Mg     2.00     04-23    TPro  6.9  /  Alb  3.7  /  TBili  0.5  /  DBili  x   /  AST  18  /  ALT  22  /  AlkPhos  50  04-23                RADIOLOGY & ADDITIONAL TESTS:  Results Reviewed:   Imaging Personally Reviewed:  Electrocardiogram Personally Reviewed:    COORDINATION OF CARE:  Care Discussed with Consultants/Other Providers [Y/N]:  Prior or Outpatient Records Reviewed [Y/N]:

## 2022-04-23 NOTE — H&P ADULT - PROBLEM SELECTOR PLAN 4
- Continue statin  - lipid profile   - Low fat/ low cholesterol diet DVT ppx: Lovenox 40 qd  PT c/s    will need to obtain patient's birth control from [facility] for continued dosing, please call [facility] pharmacy in AM to coordinate and place order in AM when available

## 2022-04-23 NOTE — BH DISCHARGE NOTE NURSING/SOCIAL WORK/PSYCH REHAB - NSDCPRRECOMMEND_PSY_ALL_CORE
Psychiatric rehabilitation staff recommends for patient to return to [facility] for further treatment once patient recovers from Covid-19 symptoms

## 2022-04-23 NOTE — H&P ADULT - ASSESSMENT
20 y/o Female PMH HLD, IBS, anxiety and depression sent from [facility] (d/t suicidal ideations on Monday) presents in ED COVID positive. Patient states she has been experiencing upper respiratory symptoms including cough, rhinorrhea, sore throat and body aches since Wednesday. Admitted to hospital to maintain isolation for COVID infection.

## 2022-04-23 NOTE — BH CONSULTATION LIAISON ASSESSMENT NOTE - OTHER PAST PSYCHIATRIC HISTORY (INCLUDE DETAILS REGARDING ONSET, COURSE OF ILLNESS, INPATIENT/OUTPATIENT TREATMENT)
2 prior hospitalization at [facility] at [facility] 2/28-3/9, outpatient care under Dr. Morataya for past 3 years, therapist Aida Martines.  Started PHP with NP Jose Mccann after most recent discharge from [facility] (3/31-4/12)   in current [facility] care however sent to [facility] for covid

## 2022-04-23 NOTE — BH CONSULTATION LIAISON ASSESSMENT NOTE - HPI (INCLUDE ILLNESS QUALITY, SEVERITY, DURATION, TIMING, CONTEXT, MODIFYING FACTORS, ASSOCIATED SIGNS AND SYMPTOMS)
Patient coming as a transfer from [facility], below is from her [facility] admission as well as ED Eval    As per ED Note from 4/18:  "This session was conducted via telehealth video conferencing platform with the patient's verbal consent. Both the patient, LCSW Breitenger and clinician were in a confidential location within their respective residences. Patients mother and father joined session.  Patient mood is "I can't do this."  Patient reports severely anxious and depressed.  She rates depression and anxiety 9.5 (1-10, 10 most severe).  Patient discussed frustration as felt okay for a couple day when out of the hospital and resurgence of symptoms.  Patient discussed guilt she has to get better, feels   pressure by her family to do so. + depressive sx of low energy, lack of motivation, anhedonia, hopelessness, helplessness and daily suicidal ideations. Patient reported on Saturday drank 2 glasses of wine, had her prescribed klonpon 0.5mg then took hydroxyzine 25mg and melatonin in a reported suicide attempt.  patient reports told her parents in the morning and then after hours emergency number contacted (spoke with Dr. Castillo)which a hospitalization was declined (did not meet criteria for involuntary hospitalization in the morning).  Patient reports ongoing suicidal ideations.  +passive SI of "wish I didn't wake up."  Patient unable to come up with a safety plan or explain ways she could keep herself safe.  Safety concerns discussed with pt. and family.  Agreed to come into [facility] for voluntary hospitalization (covid test to be perform by crisis clinic and legals to be signed).  Bed available on 2 west"    "Patient is a 21-year-old female, single, non-caregiver residing with her parents, currently taking a leave of absence from [employer], with PPhx anxiety, panic and depression with long hx of outpatient treatment (currently in treatment with ELIDA Mccann), no history of violence, no legal involvement, no self-injurious behaviors, no suicide attempts, 2 prior psychiatric hospitalization recently at [facility] from 2/28-3/9/22 and 3/31-4/12 no access to guns/weapons, who presented to partial appointment on 4/18 after SA via alcohol and pills on 4/16. Patient brought over for voluntary admission."    At [facility]  provider noted: The patient has had numerous failed medication trials. The normal course of her illness can be characterized by rapid decompensation, but also rapid improvement after hospitalization. She meets criteria for major depressive disorder with anxious distress. At present pt continues to report improved mood and denies SIIP or urges to self-harm.     On assessment, patient is alert, oriented, calm, cooperative, tearful. Patient states she has been having passive suicidal thoughts while here at [facility], but no active plan.  She is tearful as she feels she needs help and is now stuck in the medical hospital. Patient describes her mood as "not so good".  SHe denies psychosis and denies brielle. She is open to medication management. Discussed current medication. patient asking to not be on klonopin as the remeron should help with her sleep.

## 2022-04-23 NOTE — H&P ADULT - NSHPREVIEWOFSYSTEMS_GEN_ALL_CORE
Constitutional Symptoms: fevers, chills, weight loss  Eyes: No visual changes, headache, eye pain, double vision  Ears, Nose, Mouth, Throat: denies otalgia, ophthalmalgia, ADMITS to sore throat, rhinorrhea, nasal congestion, body aches  Cardiovascular: No chest pain, palpitations, edema  Respiratory: No cough, wheezing, hemoptysis, shortness of breath  Gastrointestinal: No abdominal pain, dysphagia, anorexia, nausea/vomiting, diarrhea/constipation, hematemesis, BRBPR, melena  Genitourinary: No dysuria, frequency, hematuria  Musculoskeletal: No joint pain, joint swelling, decreased ROM  Skin: No pruritus, rashes, lesions, wounds  Neurologic:  No seizures, headache, paraesthesia, numbness, limb weakness  Psychiatric: No depression, anxiety, difficulty concentrating, anhedonia, lack of energy  Endocrine: No heat/cold intolerance, mood swings, sweats, polydipsia, polyuria  Hematologic/lymphatic: No purpura, petechia, prolonged or excessive bleeding after dental extraction / injury, use of anticoagulant and antiplatelet drugs (including aspirin)  Allergic/Immunologic: No anaphylaxis, allergic response to materials, foods, animals    Positives and pertinent negatives noted and all other systems negative. Constitutional Symptoms: (+) fevers, chills  Eyes: No visual changes, (+) headache - resolved, eye pain, double vision  Ears, Nose, Mouth, Throat: denies otalgia, ophthalmalgia, ADMITS to sore throat, rhinorrhea, nasal congestion, body aches  Cardiovascular: No chest pain, palpitations, edema  Respiratory: (+)dry cough; No wheezing, hemoptysis, or shortness of breath  Gastrointestinal: No abdominal pain, dysphagia, anorexia, nausea/vomiting, diarrhea/constipation, hematemesis, BRBPR, melena  Genitourinary: No dysuria, frequency, hematuria  Musculoskeletal: No joint pain, joint swelling, decreased ROM  Skin: No pruritus, rashes, lesions, wounds  Neurologic:  No seizures, headache, paraesthesia, numbness, limb weakness; No LH/dizziness  Psychiatric: (+) depression, anxiety, No SI/HI at present  Endocrine: No heat/cold intolerance, mood swings, sweats, polydipsia, polyuria  Hematologic/lymphatic: No purpura, petechia, prolonged or excessive bleeding after dental extraction / injury, use of anticoagulant and antiplatelet drugs (including aspirin)  Allergic/Immunologic: No anaphylaxis, allergic response to materials, foods, animals    Positives and pertinent negatives noted and all other systems negative.

## 2022-04-23 NOTE — H&P ADULT - NSHPPHYSICALEXAM_GEN_ALL_CORE
Constitutional: No acute distress, pleasant and calm, resting comfortably in bed, no acute distress on exam   Ears, Nose, Mouth, and Throat: normal external ears and nose, normal hearing, moist oral mucosa, + mild erythema of the posterior pharynx, + clear thin discharge from the nasal canal   Eyes: normal conjunctiva, EOMI, PEERL  Neck: supple, no JVD  Respiratory: Clear to auscultation bilaterally. No wheezes, rales or rhonchi. Normal respiratory effort  Cardiovascular: regular rate and rhythm, S1 and S2, no murmurs, rubs or gallops, no edema, 2+ peripheral pulses  Gastrointestinal: soft, nontender, nondistended, +bowel sounds, no hernia  Skin: warm, dry, no rash  Neurologic: sensation grossly intact, CN grossly intact, non-focal exam  Musculoskeletal: no clubbing, no cyanosis, no joint swelling  Psychiatric: A&Ox3, appropriate mood, affect Vital Signs Last 24 Hrs  T(C): 36.7 (23 Apr 2022 06:37), Max: 37.2 (22 Apr 2022 22:04)  T(F): 98 (23 Apr 2022 06:37), Max: 99 (22 Apr 2022 22:04)  HR: 82 (23 Apr 2022 06:37) (82 - 117)  BP: 103/65 (23 Apr 2022 06:37) (103/65 - 119/87)  BP(mean): --  RR: 16 (23 Apr 2022 06:37) (16 - 18)  SpO2: 97% (23 Apr 2022 06:37) (97% - 100%)    Constitutional: No acute distress, pleasant and calm, resting comfortably in bed, no acute distress on exam   Ears, Nose, Mouth, and Throat: normal external ears and nose, normal hearing, moist oral mucosa, tonsils not enlarged, no exudate or erythema; + clear thin discharge from the nasal canal   Eyes: normal conjunctiva, EOMI, PERRL  Neck: supple, no JVD  Respiratory: Clear to auscultation bilaterally. No wheezes, rales or rhonchi. Normal respiratory effort  Cardiovascular: regular rate and rhythm, S1 and S2, no murmurs, rubs or gallops, no edema, 2+ peripheral pulses  Gastrointestinal: soft, nontender, nondistended, +bowel sounds, no hernia  Skin: warm, dry, no rash  Neurologic: sensation grossly intact, CN grossly intact, non-focal exam  Musculoskeletal: no clubbing, no cyanosis, no joint swelling  Psychiatric: A&Ox3, appropriate mood, affect

## 2022-04-23 NOTE — H&P ADULT - HISTORY OF PRESENT ILLNESS
22 y/o Female PMH HLD, IBS, anxiety and depression sent from [facility] (d/t suicidal ideations on Monday) presents in ED COVID positive. Patient states she has been experiencing upper respiratory symptoms including cough, rhinorrhea, sore throat and body aches since Wednesday. Denies recorded fever; however admits to chills. Denies chest pain, shortness of breath, abdominal pain, n/v/d/c, melena, dysuria, hematuria, HA. Denies feeling anxious at this time; denies current SI or HI at this time. Recently started on Remeron this week while at [facility]. Denies trialing over the counter medication to attempt to alleviate symptoms.     ED course: s/p 1 L NS bolus x 1        22 y/o Female PMH HLD, IBS, anxiety and depression sent from [facility] (d/t suicidal ideations on Monday) presents in ED COVID positive. Patient states she has been experiencing upper respiratory symptoms including cough, rhinorrhea, sore throat and body aches since Wednesday. Denies recorded fever; however admits to chills. Denies chest pain, shortness of breath, abdominal pain, n/v/d/c, melena, dysuria, hematuria, HA. Denies feeling anxious at this time; denies current SI or HI at this time. Recently started on Remeron this week while at [facility]. Denies trialing over the counter medication to attempt to alleviate symptoms.     COVID vaccinated x3, last dose in October.    ED course: s/p 1 L NS bolus x 1

## 2022-04-23 NOTE — H&P ADULT - PROBLEM SELECTOR PLAN 3
room air - continue with Cymbalta 90mg, Remeron, and clonazepam as prescribed at [facility]  - Psych consult in am   - plan as above - Continue statin  - lipid profile   - Low fat/ low cholesterol diet

## 2022-04-23 NOTE — BH CONSULTATION LIAISON ASSESSMENT NOTE - NSBHCHARTREVIEWLAB_PSY_A_CORE FT
13.7   3.78  )-----------( 125      ( 23 Apr 2022 07:02 )             39.9   04-23    139  |  104  |  8   ----------------------------<  81  3.6   |  22  |  0.61    Ca    8.8      23 Apr 2022 07:02  Phos  4.0     04-23  Mg     2.00     04-23    TPro  6.9  /  Alb  3.7  /  TBili  0.5  /  DBili  x   /  AST  18  /  ALT  22  /  AlkPhos  50  04-23

## 2022-04-23 NOTE — H&P ADULT - NSHPLABSRESULTS_GEN_ALL_CORE
EKG with NSR, HR 91, QTc 396    Labs:                        14.5   4.85  )-----------( 169      ( 22 Apr 2022 20:13 )             41.8     04-22    139  |  102  |  8   ----------------------------<  89  4.4   |  25  |  0.72    Ca    9.5      22 Apr 2022 20:13    TPro  7.9  /  Alb  4.3  /  TBili  0.5  /  DBili  x   /  AST  23  /  ALT  31  /  AlkPhos  61  04-22    Serum Pro-Brain Natriuretic Peptide :     D-Dimer Assay: <150 ng/mL DDU (04-22-22 @ 20:13)    C-Reactive Protein, Serum (04.22.22 @ 20:13)    C-Reactive Protein, Serum: 20.8 mg/L    Procalcitonin, Serum (04.22.22 @ 20:13)    Procalcitonin, Serum: 0.04:     HCG Quantitative, Serum (04.22.22 @ 20:13)    HCG Quantitative, Serum: <5.0    Ferritin, Serum (04.22.22 @ 20:13)    Ferritin, Serum: 141 ng/mL    CAPILLARY BLOOD GLUCOSE:  POCT Blood Glucose: 122 mg/dL (04-17-22 @ 16:05)    COVID-19 PCR: DETECTED     < from: Xray Chest 1 View- PORTABLE-Urgent (04.22.22 @ 20:35) >    IMPRESSION:  Clear lungs.    < end of copied text > EKG with NSR, HR 91, QTc 396    Labs:                        14.5   4.85  )-----------( 169      ( 22 Apr 2022 20:13 )             41.8     04-22    139  |  102  |  8   ----------------------------<  89  4.4   |  25  |  0.72    Ca    9.5      22 Apr 2022 20:13    TPro  7.9  /  Alb  4.3  /  TBili  0.5  /  DBili  x   /  AST  23  /  ALT  31  /  AlkPhos  61  04-22    D-Dimer Assay: <150 ng/mL DDU (04-22-22 @ 20:13)    C-Reactive Protein, Serum: 20.8 mg/L (04.22.22 @ 20:13)  Procalcitonin, Serum: 0.04 ng/mL (04.22.22 @ 20:13)  Ferritin, Serum: 141 ng/mL (04.22.22 @ 20:13)    HCG Quantitative, Serum: <5.0 mIU/mL (04.22.22 @ 20:13)    CAPILLARY BLOOD GLUCOSE:  POCT Blood Glucose: 122 mg/dL (04-17-22 @ 16:05)    COVID-19 PCR: DETECTED     CXR personally reviewed - no focal consolidations, bowel gas    EKG personally reviewed - 91bpm NSR, LAD, LVH, TWI III, QTc 396ms

## 2022-04-23 NOTE — BH CONSULTATION LIAISON ASSESSMENT NOTE - NSBHCHARTREVIEWINVESTIGATE_PSY_A_CORE FT
< from: 12 Lead ECG (03.30.22 @ 14:57) >    Ventricular Rate 109 BPM    Atrial Rate 109 BPM    P-R Interval 130 ms    QRS Duration 78 ms    Q-T Interval 314 ms    QTC Calculation(Bazett) 422 ms    P Axis 55 degrees    R Axis 37 degrees    T Axis 33 degrees    Diagnosis Line Sinus tachycardia  Possible Left atrial enlargement  Borderline ECG    < end of copied text >

## 2022-04-24 LAB
ANION GAP SERPL CALC-SCNC: 14 MMOL/L — SIGNIFICANT CHANGE UP (ref 7–14)
BUN SERPL-MCNC: 7 MG/DL — SIGNIFICANT CHANGE UP (ref 7–23)
CALCIUM SERPL-MCNC: 9.2 MG/DL — SIGNIFICANT CHANGE UP (ref 8.4–10.5)
CHLORIDE SERPL-SCNC: 101 MMOL/L — SIGNIFICANT CHANGE UP (ref 98–107)
CO2 SERPL-SCNC: 21 MMOL/L — LOW (ref 22–31)
CREAT SERPL-MCNC: 0.59 MG/DL — SIGNIFICANT CHANGE UP (ref 0.5–1.3)
EGFR: 131 ML/MIN/1.73M2 — SIGNIFICANT CHANGE UP
GLUCOSE SERPL-MCNC: 85 MG/DL — SIGNIFICANT CHANGE UP (ref 70–99)
HCT VFR BLD CALC: 35.9 % — SIGNIFICANT CHANGE UP (ref 34.5–45)
HGB BLD-MCNC: 12.2 G/DL — SIGNIFICANT CHANGE UP (ref 11.5–15.5)
MAGNESIUM SERPL-MCNC: 2.1 MG/DL — SIGNIFICANT CHANGE UP (ref 1.6–2.6)
MCHC RBC-ENTMCNC: 29.9 PG — SIGNIFICANT CHANGE UP (ref 27–34)
MCHC RBC-ENTMCNC: 34 GM/DL — SIGNIFICANT CHANGE UP (ref 32–36)
MCV RBC AUTO: 88 FL — SIGNIFICANT CHANGE UP (ref 80–100)
NRBC # BLD: 0 /100 WBCS — SIGNIFICANT CHANGE UP
NRBC # FLD: 0 K/UL — SIGNIFICANT CHANGE UP
PHOSPHATE SERPL-MCNC: 4 MG/DL — SIGNIFICANT CHANGE UP (ref 2.5–4.5)
PLATELET # BLD AUTO: 141 K/UL — LOW (ref 150–400)
POTASSIUM SERPL-MCNC: 3.6 MMOL/L — SIGNIFICANT CHANGE UP (ref 3.5–5.3)
POTASSIUM SERPL-SCNC: 3.6 MMOL/L — SIGNIFICANT CHANGE UP (ref 3.5–5.3)
RBC # BLD: 4.08 M/UL — SIGNIFICANT CHANGE UP (ref 3.8–5.2)
RBC # FLD: 12.6 % — SIGNIFICANT CHANGE UP (ref 10.3–14.5)
SODIUM SERPL-SCNC: 136 MMOL/L — SIGNIFICANT CHANGE UP (ref 135–145)
WBC # BLD: 3.72 K/UL — LOW (ref 3.8–10.5)
WBC # FLD AUTO: 3.72 K/UL — LOW (ref 3.8–10.5)

## 2022-04-24 PROCEDURE — 99232 SBSQ HOSP IP/OBS MODERATE 35: CPT

## 2022-04-24 RX ADMIN — LORATADINE 10 MILLIGRAM(S): 10 TABLET ORAL at 11:30

## 2022-04-24 RX ADMIN — MIRTAZAPINE 15 MILLIGRAM(S): 45 TABLET, ORALLY DISINTEGRATING ORAL at 21:52

## 2022-04-24 RX ADMIN — ENOXAPARIN SODIUM 40 MILLIGRAM(S): 100 INJECTION SUBCUTANEOUS at 17:42

## 2022-04-24 RX ADMIN — ATORVASTATIN CALCIUM 10 MILLIGRAM(S): 80 TABLET, FILM COATED ORAL at 21:52

## 2022-04-24 RX ADMIN — DULOXETINE HYDROCHLORIDE 90 MILLIGRAM(S): 30 CAPSULE, DELAYED RELEASE ORAL at 11:30

## 2022-04-24 NOTE — PROGRESS NOTE ADULT - SUBJECTIVE AND OBJECTIVE BOX
[facility] Division of Hospital Medicine  Almas Grissom MD  Pager (M-F, 8A-5P): [phone]      SUBJECTIVE / OVERNIGHT EVENTS: NAEON. Ptient denies SI/HI. Was able to get Contraceptive medication from [facility]. Notes improved cough. Denies fevers/chill/night sweats/HA/N/V/D/C. Wanted to discuss use of remeron as she feels it makes her groggy    MEDICATIONS  (STANDING):  atorvastatin 10 milliGRAM(s) Oral at bedtime  clonazePAM  Tablet 0.5 milliGRAM(s) Oral at bedtime  DULoxetine 90 milliGRAM(s) Oral daily  enoxaparin Injectable 40 milliGRAM(s) SubCutaneous every 24 hours  loratadine 10 milliGRAM(s) Oral daily  mirtazapine 15 milliGRAM(s) Oral at bedtime  Vestura: Ethinyl estradiol 0.02 mg and drospirenone 3 mg 1 Tablet(s) 1 Tablet(s) Oral daily    MEDICATIONS  (PRN):  acetaminophen     Tablet .. 650 milliGRAM(s) Oral every 6 hours PRN Temp greater or equal to 38C (100.4F), Mild Pain (1 - 3)  benzonatate 200 milliGRAM(s) Oral every 8 hours PRN Cough  hydrOXYzine hydrochloride 25 milliGRAM(s) Oral every 6 hours PRN Anxiety  melatonin 6 milliGRAM(s) Oral at bedtime PRN Insomnia      I&O's Summary      PHYSICAL EXAM:  Vital Signs Last 24 Hrs  T(C): 36.4 (24 Apr 2022 11:27), Max: 37.1 (24 Apr 2022 03:00)  T(F): 97.6 (24 Apr 2022 11:27), Max: 98.8 (24 Apr 2022 03:00)  HR: 99 (24 Apr 2022 11:27) (94 - 107)  BP: 110/62 (24 Apr 2022 11:27) (110/62 - 130/87)  BP(mean): --  RR: 18 (24 Apr 2022 11:27) (16 - 18)  SpO2: 98% (24 Apr 2022 11:27) (98% - 100%)  CONSTITUTIONAL: NAD, well-developed, well-groomed  EYES: PERRLA; conjunctiva and sclera clear  ENMT: Moist oral mucosa, no pharyngeal injection or exudates; normal dentition  NECK: Supple, no palpable masses; no thyromegaly  RESPIRATORY: Normal respiratory effort; lungs are clear to auscultation bilaterally  CARDIOVASCULAR: Regular rate and rhythm, normal S1 and S2, no murmur/rub/gallop; No lower extremity edema; Peripheral pulses are 2+ bilaterally  ABDOMEN: Nontender to palpation, normoactive bowel sounds, no rebound/guarding; No hepatosplenomegaly  MUSCULOSKELETAL:  Normal gait; no clubbing or cyanosis of digits; no joint swelling or tenderness to palpation  PSYCH: A+O to person, place, and time; affect appropriate  NEUROLOGY: CN 2-12 are intact and symmetric; no gross sensory deficits   SKIN: No rashes; no palpable lesions    LABS:                        12.2   3.72  )-----------( 141      ( 24 Apr 2022 08:19 )             35.9     04-24    136  |  101  |  7   ----------------------------<  85  3.6   |  21<L>  |  0.59    Ca    9.2      24 Apr 2022 08:19  Phos  4.0     04-24  Mg     2.10     04-24    TPro  6.9  /  Alb  3.7  /  TBili  0.5  /  DBili  x   /  AST  18  /  ALT  22  /  AlkPhos  50  04-23                RADIOLOGY & ADDITIONAL TESTS:  Results Reviewed:   Imaging Personally Reviewed:  Electrocardiogram Personally Reviewed:    COORDINATION OF CARE:  Care Discussed with Consultants/Other Providers [Y/N]:  Prior or Outpatient Records Reviewed [Y/N]:

## 2022-04-25 DIAGNOSIS — F60.3 BORDERLINE PERSONALITY DISORDER: ICD-10-CM

## 2022-04-25 LAB
ANION GAP SERPL CALC-SCNC: 12 MMOL/L — SIGNIFICANT CHANGE UP (ref 7–14)
BUN SERPL-MCNC: 10 MG/DL — SIGNIFICANT CHANGE UP (ref 7–23)
CALCIUM SERPL-MCNC: 8.9 MG/DL — SIGNIFICANT CHANGE UP (ref 8.4–10.5)
CHLORIDE SERPL-SCNC: 102 MMOL/L — SIGNIFICANT CHANGE UP (ref 98–107)
CO2 SERPL-SCNC: 24 MMOL/L — SIGNIFICANT CHANGE UP (ref 22–31)
CREAT SERPL-MCNC: 0.68 MG/DL — SIGNIFICANT CHANGE UP (ref 0.5–1.3)
EGFR: 126 ML/MIN/1.73M2 — SIGNIFICANT CHANGE UP
GLUCOSE SERPL-MCNC: 84 MG/DL — SIGNIFICANT CHANGE UP (ref 70–99)
HCT VFR BLD CALC: 38.1 % — SIGNIFICANT CHANGE UP (ref 34.5–45)
HGB BLD-MCNC: 13.2 G/DL — SIGNIFICANT CHANGE UP (ref 11.5–15.5)
MAGNESIUM SERPL-MCNC: 2.1 MG/DL — SIGNIFICANT CHANGE UP (ref 1.6–2.6)
MCHC RBC-ENTMCNC: 29.5 PG — SIGNIFICANT CHANGE UP (ref 27–34)
MCHC RBC-ENTMCNC: 34.6 GM/DL — SIGNIFICANT CHANGE UP (ref 32–36)
MCV RBC AUTO: 85 FL — SIGNIFICANT CHANGE UP (ref 80–100)
NRBC # BLD: 0 /100 WBCS — SIGNIFICANT CHANGE UP
NRBC # FLD: 0 K/UL — SIGNIFICANT CHANGE UP
PHOSPHATE SERPL-MCNC: 4 MG/DL — SIGNIFICANT CHANGE UP (ref 2.5–4.5)
PLATELET # BLD AUTO: 142 K/UL — LOW (ref 150–400)
POTASSIUM SERPL-MCNC: 3.4 MMOL/L — LOW (ref 3.5–5.3)
POTASSIUM SERPL-SCNC: 3.4 MMOL/L — LOW (ref 3.5–5.3)
RBC # BLD: 4.48 M/UL — SIGNIFICANT CHANGE UP (ref 3.8–5.2)
RBC # FLD: 12.3 % — SIGNIFICANT CHANGE UP (ref 10.3–14.5)
SODIUM SERPL-SCNC: 138 MMOL/L — SIGNIFICANT CHANGE UP (ref 135–145)
WBC # BLD: 4.52 K/UL — SIGNIFICANT CHANGE UP (ref 3.8–10.5)
WBC # FLD AUTO: 4.52 K/UL — SIGNIFICANT CHANGE UP (ref 3.8–10.5)

## 2022-04-25 PROCEDURE — 99233 SBSQ HOSP IP/OBS HIGH 50: CPT

## 2022-04-25 PROCEDURE — 99232 SBSQ HOSP IP/OBS MODERATE 35: CPT

## 2022-04-25 RX ORDER — BACITRACIN ZINC 500 UNIT/G
1 OINTMENT IN PACKET (EA) TOPICAL
Refills: 0 | Status: COMPLETED | OUTPATIENT
Start: 2022-04-25 | End: 2022-04-30

## 2022-04-25 RX ORDER — POTASSIUM CHLORIDE 20 MEQ
40 PACKET (EA) ORAL ONCE
Refills: 0 | Status: COMPLETED | OUTPATIENT
Start: 2022-04-25 | End: 2022-04-25

## 2022-04-25 RX ADMIN — LORATADINE 10 MILLIGRAM(S): 10 TABLET ORAL at 11:34

## 2022-04-25 RX ADMIN — MIRTAZAPINE 15 MILLIGRAM(S): 45 TABLET, ORALLY DISINTEGRATING ORAL at 22:11

## 2022-04-25 RX ADMIN — Medication 1 APPLICATION(S): at 17:00

## 2022-04-25 RX ADMIN — DULOXETINE HYDROCHLORIDE 90 MILLIGRAM(S): 30 CAPSULE, DELAYED RELEASE ORAL at 11:34

## 2022-04-25 RX ADMIN — ATORVASTATIN CALCIUM 10 MILLIGRAM(S): 80 TABLET, FILM COATED ORAL at 22:11

## 2022-04-25 RX ADMIN — Medication 40 MILLIEQUIVALENT(S): at 10:39

## 2022-04-25 RX ADMIN — ENOXAPARIN SODIUM 40 MILLIGRAM(S): 100 INJECTION SUBCUTANEOUS at 16:55

## 2022-04-25 NOTE — PROGRESS NOTE ADULT - SUBJECTIVE AND OBJECTIVE BOX
[facility] Medicine  Pager [phone]    Patient is a 21y old  Female who presents with a chief complaint of COVID (24 Apr 2022 13:51)      INTERVAL HPI/OVERNIGHT EVENTS:    MEDICATIONS  (STANDING):  atorvastatin 10 milliGRAM(s) Oral at bedtime  DULoxetine 90 milliGRAM(s) Oral daily  enoxaparin Injectable 40 milliGRAM(s) SubCutaneous every 24 hours  loratadine 10 milliGRAM(s) Oral daily  mirtazapine 15 milliGRAM(s) Oral at bedtime  Vestura: Ethinyl estradiol 0.02 mg and drospirenone 3 mg 1 Tablet(s) 1 Tablet(s) Oral daily    MEDICATIONS  (PRN):  acetaminophen     Tablet .. 650 milliGRAM(s) Oral every 6 hours PRN Temp greater or equal to 38C (100.4F), Mild Pain (1 - 3)  benzonatate 200 milliGRAM(s) Oral every 8 hours PRN Cough  hydrOXYzine hydrochloride 25 milliGRAM(s) Oral every 6 hours PRN Anxiety  melatonin 6 milliGRAM(s) Oral at bedtime PRN Insomnia      Allergies    No Known Allergies    Intolerances        REVIEW OF SYSTEMS:  Please see interval HPI:    Vital Signs Last 24 Hrs  T(C): 36.4 (25 Apr 2022 11:31), Max: 36.7 (25 Apr 2022 03:00)  T(F): 97.5 (25 Apr 2022 11:31), Max: 98.1 (25 Apr 2022 03:00)  HR: 99 (25 Apr 2022 11:31) (82 - 99)  BP: 117/71 (25 Apr 2022 11:31) (115/80 - 126/85)  BP(mean): --  RR: 18 (25 Apr 2022 11:31) (18 - 18)  SpO2: 99% (25 Apr 2022 11:31) (98% - 99%)  I&O's Detail        PHYSICAL EXAM:  GENERAL:   HEAD:    EYES:   ENMT:   NECK:   NERVOUS SYSTEM:    CHEST/LUNG:   HEART:   ABDOMEN:   EXTREMITIES:    LYMPH:   SKIN:     LABS:                        13.2   4.52  )-----------( 142      ( 25 Apr 2022 07:26 )             38.1     25 Apr 2022 07:26    138    |  102    |  10     ----------------------------<  84     3.4     |  24     |  0.68     Ca    8.9        25 Apr 2022 07:26  Phos  4.0       25 Apr 2022 07:26  Mg     2.10      25 Apr 2022 07:26        CAPILLARY BLOOD GLUCOSE        BLOOD CULTURE    RADIOLOGY & ADDITIONAL TESTS:    Imaging Personally Reviewed:  [ ] YES     Consultant(s) Notes Reviewed:      Care Discussed with Consultants/Other Providers: [facility]  Pager [phone]    Patient is a 21y old  Female who presents with a chief complaint of COVID (24 Apr 2022 13:51)      INTERVAL HPI/OVERNIGHT EVENTS:  Feels well overall, denies SOB, had some tingling in her fingers last night but resolved after she woke up, had low potassium that was repleted. No other concerns at this time, wishes to speak to psych about her medication regimen. Declines offer to update family regarding hospital course...     MEDICATIONS  (STANDING):  atorvastatin 10 milliGRAM(s) Oral at bedtime  DULoxetine 90 milliGRAM(s) Oral daily  enoxaparin Injectable 40 milliGRAM(s) SubCutaneous every 24 hours  loratadine 10 milliGRAM(s) Oral daily  mirtazapine 15 milliGRAM(s) Oral at bedtime  Vestura: Ethinyl estradiol 0.02 mg and drospirenone 3 mg 1 Tablet(s) 1 Tablet(s) Oral daily    MEDICATIONS  (PRN):  acetaminophen     Tablet .. 650 milliGRAM(s) Oral every 6 hours PRN Temp greater or equal to 38C (100.4F), Mild Pain (1 - 3)  benzonatate 200 milliGRAM(s) Oral every 8 hours PRN Cough  hydrOXYzine hydrochloride 25 milliGRAM(s) Oral every 6 hours PRN Anxiety  melatonin 6 milliGRAM(s) Oral at bedtime PRN Insomnia    Allergies  No Known Allergies    Intolerances    REVIEW OF SYSTEMS:  Please see interval HPI:    Vital Signs Last 24 Hrs  T(C): 36.4 (25 Apr 2022 11:31), Max: 36.7 (25 Apr 2022 03:00)  T(F): 97.5 (25 Apr 2022 11:31), Max: 98.1 (25 Apr 2022 03:00)  HR: 99 (25 Apr 2022 11:31) (82 - 99)  BP: 117/71 (25 Apr 2022 11:31) (115/80 - 126/85)  RR: 18 (25 Apr 2022 11:31) (18 - 18)  SpO2: 99% (25 Apr 2022 11:31) (98% - 99%)  I&O's Detail    PHYSICAL EXAM:  GENERAL: NAD, young adult female, lying in bed  HEAD: NC/AT  EYES: EOMI, clear sclera/conjunctiva  ENMT: MMM  NECK: supple  NERVOUS SYSTEM: moving all extremities, nonfocal, pleasant,    CHEST/LUNG: CTAB, comfortable on RA  HEART: S1S2 RRR  ABDOMEN: soft, non-tender  EXTREMITIES:  no c/c/e    LABS:                        13.2   4.52  )-----------( 142      ( 25 Apr 2022 07:26 )             38.1     25 Apr 2022 07:26    138    |  102    |  10     ----------------------------<  84     3.4     |  24     |  0.68     Ca    8.9        25 Apr 2022 07:26  Phos  4.0       25 Apr 2022 07:26  Mg     2.10      25 Apr 2022 07:26    CAPILLARY BLOOD GLUCOSE    BLOOD CULTURE    RADIOLOGY & ADDITIONAL TESTS:    Imaging Personally Reviewed:  [ ] YES     Consultant(s) Notes Reviewed:      Care Discussed with Consultants/Other Providers: Dr. Lara () re: patient appears to be improving, will need to assess if still need inpatient psychiatric admission (was voluntary admission), TERENCE [place] re: medically stable, to f/u further psych recs

## 2022-04-25 NOTE — SOCIAL WORK POST DISCHARGE FOLLOW UP NOTE - NSBHSWFOLLOWUP_PSY_ALL_CORE_FT
SW has been informed that pt had tested positive for COVID-19 on Friday evening and was transferred to [facility]. SW has informed social work department at this time as pt is no longer on the unit.

## 2022-04-25 NOTE — BH CONSULTATION LIAISON PROGRESS NOTE - NSBHASSESSMENTFT_PSY_ALL_CORE
"Patient is a 21-year-old female, single, non-caregiver residing with her parents, currently taking a leave of absence from [employer], with PPhx anxiety, panic and depression with long hx of outpatient treatment (currently in treatment with ELIDA Mccann), no history of violence, no legal involvement, no self-injurious behaviors, no suicide attempts, 2 prior psychiatric hospitalization recently at [facility] from 2/28-3/9/22 and 3/31-4/12 no access to guns/weapons, who presented to partial appointment on 4/18 after SA via alcohol and pills on 4/16. Patient brought over for voluntary admission." Sent to [employer] for COVID + test result.    4/25 - Pt is calm, future oriented, no SI/SIB/HI or AVH, reports passive SI over weekend. Denies feeling depressed or anxious. Sleeping well. Currently with mild URI Sx.    PLAN  - continue Duloxetine 90 mg daily (depression) - Home medication  - discontinue Clonazepam 0.5 mg QHS (insomnia) - Home medication - patient has been refusing this medication at [facility] and at [employer]. will monitor sleep/anxiety while medically admitted  - continue Mirtazapine 15 mg QHS (insomnia) - Home medication, started 4/20, increased to 15mg 4/22  - patient on CO for recent SA  - PRN for anxiety: vistaril 25mg q6hrs   - dispo pending as patient COVID + and requires isolation -- will collaborate with [facility] regarding safe discharge planning as Pt will likely discharge from [employer] if she remains psychiatrically stable

## 2022-04-26 LAB
ANION GAP SERPL CALC-SCNC: 12 MMOL/L — SIGNIFICANT CHANGE UP (ref 7–14)
BUN SERPL-MCNC: 11 MG/DL — SIGNIFICANT CHANGE UP (ref 7–23)
CALCIUM SERPL-MCNC: 8.6 MG/DL — SIGNIFICANT CHANGE UP (ref 8.4–10.5)
CHLORIDE SERPL-SCNC: 104 MMOL/L — SIGNIFICANT CHANGE UP (ref 98–107)
CO2 SERPL-SCNC: 23 MMOL/L — SIGNIFICANT CHANGE UP (ref 22–31)
CREAT SERPL-MCNC: 0.58 MG/DL — SIGNIFICANT CHANGE UP (ref 0.5–1.3)
CRP SERPL-MCNC: <4 MG/L — SIGNIFICANT CHANGE UP
D DIMER BLD IA.RAPID-MCNC: 153 NG/ML DDU — SIGNIFICANT CHANGE UP
EGFR: 131 ML/MIN/1.73M2 — SIGNIFICANT CHANGE UP
FERRITIN SERPL-MCNC: 148 NG/ML — SIGNIFICANT CHANGE UP (ref 15–150)
GLUCOSE SERPL-MCNC: 85 MG/DL — SIGNIFICANT CHANGE UP (ref 70–99)
HCT VFR BLD CALC: 38.1 % — SIGNIFICANT CHANGE UP (ref 34.5–45)
HGB BLD-MCNC: 13.1 G/DL — SIGNIFICANT CHANGE UP (ref 11.5–15.5)
LDH SERPL L TO P-CCNC: 168 U/L — SIGNIFICANT CHANGE UP (ref 135–225)
MAGNESIUM SERPL-MCNC: 2.1 MG/DL — SIGNIFICANT CHANGE UP (ref 1.6–2.6)
MCHC RBC-ENTMCNC: 29.8 PG — SIGNIFICANT CHANGE UP (ref 27–34)
MCHC RBC-ENTMCNC: 34.4 GM/DL — SIGNIFICANT CHANGE UP (ref 32–36)
MCV RBC AUTO: 86.6 FL — SIGNIFICANT CHANGE UP (ref 80–100)
NRBC # BLD: 0 /100 WBCS — SIGNIFICANT CHANGE UP
NRBC # FLD: 0 K/UL — SIGNIFICANT CHANGE UP
PLATELET # BLD AUTO: 137 K/UL — LOW (ref 150–400)
POTASSIUM SERPL-MCNC: 3.5 MMOL/L — SIGNIFICANT CHANGE UP (ref 3.5–5.3)
POTASSIUM SERPL-SCNC: 3.5 MMOL/L — SIGNIFICANT CHANGE UP (ref 3.5–5.3)
PROCALCITONIN SERPL-MCNC: 0.03 NG/ML — SIGNIFICANT CHANGE UP (ref 0.02–0.1)
RBC # BLD: 4.4 M/UL — SIGNIFICANT CHANGE UP (ref 3.8–5.2)
RBC # FLD: 12.3 % — SIGNIFICANT CHANGE UP (ref 10.3–14.5)
SODIUM SERPL-SCNC: 139 MMOL/L — SIGNIFICANT CHANGE UP (ref 135–145)
WBC # BLD: 4.57 K/UL — SIGNIFICANT CHANGE UP (ref 3.8–10.5)
WBC # FLD AUTO: 4.57 K/UL — SIGNIFICANT CHANGE UP (ref 3.8–10.5)

## 2022-04-26 PROCEDURE — 99232 SBSQ HOSP IP/OBS MODERATE 35: CPT

## 2022-04-26 RX ORDER — DULOXETINE HYDROCHLORIDE 30 MG/1
90 CAPSULE, DELAYED RELEASE ORAL
Qty: 0 | Refills: 0 | DISCHARGE

## 2022-04-26 RX ADMIN — DULOXETINE HYDROCHLORIDE 90 MILLIGRAM(S): 30 CAPSULE, DELAYED RELEASE ORAL at 11:53

## 2022-04-26 RX ADMIN — MIRTAZAPINE 15 MILLIGRAM(S): 45 TABLET, ORALLY DISINTEGRATING ORAL at 21:09

## 2022-04-26 RX ADMIN — Medication 1 APPLICATION(S): at 17:22

## 2022-04-26 RX ADMIN — ENOXAPARIN SODIUM 40 MILLIGRAM(S): 100 INJECTION SUBCUTANEOUS at 17:22

## 2022-04-26 RX ADMIN — ATORVASTATIN CALCIUM 10 MILLIGRAM(S): 80 TABLET, FILM COATED ORAL at 21:09

## 2022-04-26 RX ADMIN — Medication 1 APPLICATION(S): at 06:03

## 2022-04-26 RX ADMIN — LORATADINE 10 MILLIGRAM(S): 10 TABLET ORAL at 11:54

## 2022-04-26 NOTE — BH INPATIENT PSYCHIATRY DISCHARGE NOTE - HPI (INCLUDE ILLNESS QUALITY, SEVERITY, DURATION, TIMING, CONTEXT, MODIFYING FACTORS, ASSOCIATED SIGNS AND SYMPTOMS)
Patient is a 21-year-old female, single, non-caregiver residing with her parents, currently taking a leave of absence from [employer], with PPhx anxiety, panic and depression with long hx of outpatient treatment (currently in treatment with ELIDA Mccann), no history of violence, no legal involvement, no self-injurious behaviors, no suicide attempts, 2 prior psychiatric hospitalization recently at [facility] from 2/28-3/9/22 and 3/31-4/12 no access to guns/weapons, who presented to partial appointment on 4/18 after SA via alcohol and pills on 4/16. Patient brought over for voluntary admission.    On assessment, patient stated that she was just discharged from  about a week ago. She reported feeling fine at discharge but since being home she has been feeling more anxious and depressed. She endorsed "low energy, amotivation, guilt, "zero appetite" and "terrible" sleep. She stated that she "relapsed, I felt overwhelmed, anxious and depressed" and that she feels her mother might be a trigger for her. She denied any recent panic attacks. She endorsed having chronic SI with no intent or plan. She stated that this was her first SA and it was impulsive. She drank 6-8oz of wine and took .5mg clonopin, 25mg trazodone and 5mg melatonin on 4/16 before going to sleep. She is upset the SA was unsuccessful and told her parents on 4/17. She was not brought into the hospital but spoke with someone on the hotline. She continues to endorse SI with no intent or plan on the unit. She denies any NSSIB but stated that she does pick her nails. Patient denies any substance use. She denies any hx of psychosis or brielle.       As per EM Note from 4/18:  "This session was conducted via telehealth video conferencing platform with the patient's verbal consent. Both the [facility], [facility] and clinician were in a confidential location within their respective residences. Patients mother and father joined session.  Patient mood is "I can't do this."  Patient reports severely anxious and depressed.  She rates depression and anxiety 9.5 (1-10, 10 most severe).  Patient discussed frustration as felt okay for a couple day when out of the hospital and resurgence of symptoms.  Patient discussed guilt she has to get better, feels   pressure by her family to do so. + depressive sx of low energy, lack of motivation, anhedonia, hopelessness, helplessness and daily suicidal ideations. Patient reported on Saturday drank 2 glasses of wine, had her prescribed klonpon 0.5mg then took hydroxyzine 25mg and melatonin in a reported suicide attempt.  patient reports told her parents in the morning and then after hours emergency number contacted (spoke with Dr. Castillo)which a hospitalization was declined (did not meet criteria for involuntary hospitalization in the morning).  Patient reports ongoing suicidal ideations.  +passive SI of "wish I didnt wake up."  Patient unable to come up with a safety plan or explain ways she could keep herself safe.  Safety concerns discussed with pt. and family.  Agreed to come into [facility] for voluntary hospitalization (covid test to be perform by crisis clinic and legals to be signed).  Bed available on 2 west"

## 2022-04-26 NOTE — BH INPATIENT PSYCHIATRY DISCHARGE NOTE - NSDCMRMEDTOKEN_GEN_ALL_CORE_FT
clonazePAM 0.5 mg oral tablet: 1 dose(s) orally once a day (at bedtime)  loratadine 10 mg oral tablet: 1 tab(s) orally once a day  mirtazapine 15 mg oral tablet: 1 tab(s) orally once a day (at bedtime)  Vestura 3 mg-0.02 mg oral tablet: 1 tab(s) orally once a day

## 2022-04-26 NOTE — BH INPATIENT PSYCHIATRY DISCHARGE NOTE - OTHER PAST PSYCHIATRIC HISTORY (INCLUDE DETAILS REGARDING ONSET, COURSE OF ILLNESS, INPATIENT/OUTPATIENT TREATMENT)
Patient was recently discharged from [facility] and attending PHP. See history for details.

## 2022-04-26 NOTE — BH INPATIENT PSYCHIATRY DISCHARGE NOTE - NSDCCPCAREPLAN_GEN_ALL_CORE_FT
PRINCIPAL DISCHARGE DIAGNOSIS  Diagnosis: Major depressive disorder  Assessment and Plan of Treatment:       SECONDARY DISCHARGE DIAGNOSES  Diagnosis: Borderline personality disorder  Assessment and Plan of Treatment:

## 2022-04-26 NOTE — BH INPATIENT PSYCHIATRY DISCHARGE NOTE - HOSPITAL COURSE
Patient is a 21-year-old female with a history of major depressive disorder, generalized anxiety disorder who presents to the hospital for a suicide attempt by overdose on 4-6 ounces of alcohol and half a milligram of Klonopin. The patient has been psychiatrically hospitalized three times in the past six months, and was recently discharged last week, currently participating in partial hospitalization. The patient has had numerous failed medication trials. The normal course of her illness can be characterized by rapid decompensation, but also rapid improvement after hospitalization. She meets criteria for major depressive disorder with anxious distress.     Patient was started on her home medication duloxetine 90 mg daily, mirtazapine 7.5 mg QHS, and clonazepam 0.5 mg QHS. The dose of mirtazapine was increased to 15 mg QHS. Risks, benefits, and side effects of all medication prescribed were discussed in detail, including the increased risk of suicidality for antidepressants in patients under the age of 24.    Over the course of hospitalization, the patient reports resurgence of depression and anxiety soon after she was discharged from her previous hospitalization. Though she was attending partial hospitalization virtually, she did not feel that this was particularly useful due to the format of the sessions. 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 She acknowledges that coming to the hospital is a way in which her distress can be heard by her parents. She acknowledged that her overdose would not have been lethal. She initially expressed frustration with DBT skills and hoped to receive ECT for her depressive symptoms. We discussed the diagnostic criteria for borderline personality disorder, and she identified with many of them. Both she and her father agreed that in-person DBT groups would benefit her in light of this potential diagnosis. Though we had discussed pursuing ECT during a previous hospitalization, the treatment team ultimately felt that this would not be the most effective treatment for her at this time.    No restraints, or self-injurious behavior noted during the hospitalization.    On April 22nd, the patient reported a number of upper respiratory symptoms, and she subsequently tested positive for COVID-19. She was transferred to [facility] for a medical bed. Plan is for her to return to [facility] after the required quarantine period.    Risk assessment:  TBD    DSM-5 diagnosis:  Major depressive disorder with anxious distress  R/o Borderline personality disorder    Discharge medication:  - Duloxetine 90 mg daily  - Mirtazapine 15 mg QHS  - Clonazepam 0.5 mg QHS

## 2022-04-26 NOTE — BH INPATIENT PSYCHIATRY DISCHARGE NOTE - NSBHMETABOLIC_PSY_ALL_CORE_FT
BMI: BMI (kg/m2): 27.3 (04-22-22 @ 18:15)  HbA1c: A1C with Estimated Average Glucose Result: 4.6 % (04-19-22 @ 11:13)    Glucose: POCT Blood Glucose.: 122 mg/dL (04-17-22 @ 16:05)    BP: --  Lipid Panel: Date/Time: 04-23-22 @ 07:02  Cholesterol, Serum: 140  Direct LDL: --  HDL Cholesterol, Serum: 63  Total Cholesterol/HDL Ration Measurement: --  Triglycerides, Serum: 147

## 2022-04-26 NOTE — PROGRESS NOTE ADULT - SUBJECTIVE AND OBJECTIVE BOX
[facility] Medicine  Pager [phone]    Patient is a 21y old  Female who presents with a chief complaint of COVID (25 Apr 2022 12:44)      INTERVAL HPI/OVERNIGHT EVENTS:    MEDICATIONS  (STANDING):  atorvastatin 10 milliGRAM(s) Oral at bedtime  BACItracin   Ointment 1 Application(s) Topical two times a day  DULoxetine 90 milliGRAM(s) Oral daily  enoxaparin Injectable 40 milliGRAM(s) SubCutaneous every 24 hours  loratadine 10 milliGRAM(s) Oral daily  mirtazapine 15 milliGRAM(s) Oral at bedtime  Vestura: Ethinyl estradiol 0.02 mg and drospirenone 3 mg 1 Tablet(s) 1 Tablet(s) Oral daily    MEDICATIONS  (PRN):  acetaminophen     Tablet .. 650 milliGRAM(s) Oral every 6 hours PRN Temp greater or equal to 38C (100.4F), Mild Pain (1 - 3)  benzonatate 200 milliGRAM(s) Oral every 8 hours PRN Cough  hydrOXYzine hydrochloride 25 milliGRAM(s) Oral every 6 hours PRN Anxiety  melatonin 6 milliGRAM(s) Oral at bedtime PRN Insomnia      Allergies    No Known Allergies    Intolerances        REVIEW OF SYSTEMS:  Please see interval HPI:    Vital Signs Last 24 Hrs  T(C): 36.8 (26 Apr 2022 10:56), Max: 36.9 (26 Apr 2022 03:30)  T(F): 98.2 (26 Apr 2022 10:56), Max: 98.5 (26 Apr 2022 03:30)  HR: 94 (26 Apr 2022 10:56) (62 - 94)  BP: 117/70 (26 Apr 2022 10:56) (106/67 - 128/74)  BP(mean): --  RR: 16 (26 Apr 2022 10:56) (16 - 18)  SpO2: 99% (26 Apr 2022 10:56) (96% - 99%)  I&O's Detail        PHYSICAL EXAM:  GENERAL:   HEAD:    EYES:   ENMT:   NECK:   NERVOUS SYSTEM:    CHEST/LUNG:   HEART:   ABDOMEN:   EXTREMITIES:    LYMPH:   SKIN:     LABS:                        13.1   4.57  )-----------( 137      ( 26 Apr 2022 06:50 )             38.1     26 Apr 2022 06:50    139    |  104    |  11     ----------------------------<  85     3.5     |  23     |  0.58     Ca    8.6        26 Apr 2022 06:50  Mg     2.10      26 Apr 2022 06:50        CAPILLARY BLOOD GLUCOSE        BLOOD CULTURE    RADIOLOGY & ADDITIONAL TESTS:    Imaging Personally Reviewed:  [ ] YES     Consultant(s) Notes Reviewed:      Care Discussed with Consultants/Other Providers: [facility] Medicine  Pager [phone]    Patient is a 21y old  Female who presents with a chief complaint of COVID (25 Apr 2022 12:44)      INTERVAL HPI/OVERNIGHT EVENTS:  No complaints, denies cough/SOB, otherwise feeling well, was able to speak to psych yesterday to address medication concerns. Father Tod at bedside (patient provides permission to update on hospital course), aware of plan to continue psychiatric safety assessment (ie determine need for further inpatient psych hospitalization or if improved enough to be discharged home safely),     MEDICATIONS  (STANDING):  atorvastatin 10 milliGRAM(s) Oral at bedtime  BACItracin   Ointment 1 Application(s) Topical two times a day  DULoxetine 90 milliGRAM(s) Oral daily  enoxaparin Injectable 40 milliGRAM(s) SubCutaneous every 24 hours  loratadine 10 milliGRAM(s) Oral daily  mirtazapine 15 milliGRAM(s) Oral at bedtime  Vestura: Ethinyl estradiol 0.02 mg and drospirenone 3 mg 1 Tablet(s) 1 Tablet(s) Oral daily    MEDICATIONS  (PRN):  acetaminophen     Tablet .. 650 milliGRAM(s) Oral every 6 hours PRN Temp greater or equal to 38C (100.4F), Mild Pain (1 - 3)  benzonatate 200 milliGRAM(s) Oral every 8 hours PRN Cough  hydrOXYzine hydrochloride 25 milliGRAM(s) Oral every 6 hours PRN Anxiety  melatonin 6 milliGRAM(s) Oral at bedtime PRN Insomnia      Allergies    No Known Allergies    Intolerances        REVIEW OF SYSTEMS:  Please see interval HPI:    Vital Signs Last 24 Hrs  T(C): 36.8 (26 Apr 2022 10:56), Max: 36.9 (26 Apr 2022 03:30)  T(F): 98.2 (26 Apr 2022 10:56), Max: 98.5 (26 Apr 2022 03:30)  HR: 94 (26 Apr 2022 10:56) (62 - 94)  BP: 117/70 (26 Apr 2022 10:56) (106/67 - 128/74)  BP(mean): --  RR: 16 (26 Apr 2022 10:56) (16 - 18)  SpO2: 99% (26 Apr 2022 10:56) (96% - 99%)  I&O's Detail      PHYSICAL EXAM:  GENERAL: NAD, sitting in bed, father at bedside  HEAD:  NC/AT  EYES: EOMI, clear sclera/conjunctiva  ENMT: MMM  NECK: supple  NERVOUS SYSTEM: non-focal, pleasant    CHEST/LUNG: Comfortable on RA, speaking in full sentences  EXTREMITIES:  no c/c/e    LABS:                        13.1   4.57  )-----------( 137      ( 26 Apr 2022 06:50 )             38.1     26 Apr 2022 06:50    139    |  104    |  11     ----------------------------<  85     3.5     |  23     |  0.58     Ca    8.6        26 Apr 2022 06:50  Mg     2.10      26 Apr 2022 06:50    CAPILLARY BLOOD GLUCOSE    BLOOD CULTURE    RADIOLOGY & ADDITIONAL TESTS:    Imaging Personally Reviewed:  [ ] YES     Consultant(s) Notes Reviewed:      Care Discussed with Consultants/Other Providers: TERENCE Montana re: pending psych re: dispo

## 2022-04-26 NOTE — BH INPATIENT PSYCHIATRY DISCHARGE NOTE - NSBHDCMEDICALFT_PSY_A_CORE
Patient tested positive for COVID-19 on 4/22 and was transferred to [facility] for a medical bed.

## 2022-04-27 PROCEDURE — 99232 SBSQ HOSP IP/OBS MODERATE 35: CPT

## 2022-04-27 PROCEDURE — 99233 SBSQ HOSP IP/OBS HIGH 50: CPT

## 2022-04-27 RX ADMIN — Medication 1 APPLICATION(S): at 15:52

## 2022-04-27 RX ADMIN — ENOXAPARIN SODIUM 40 MILLIGRAM(S): 100 INJECTION SUBCUTANEOUS at 17:21

## 2022-04-27 RX ADMIN — ATORVASTATIN CALCIUM 10 MILLIGRAM(S): 80 TABLET, FILM COATED ORAL at 22:14

## 2022-04-27 RX ADMIN — DULOXETINE HYDROCHLORIDE 90 MILLIGRAM(S): 30 CAPSULE, DELAYED RELEASE ORAL at 13:02

## 2022-04-27 RX ADMIN — LORATADINE 10 MILLIGRAM(S): 10 TABLET ORAL at 13:02

## 2022-04-27 RX ADMIN — Medication 1 APPLICATION(S): at 05:41

## 2022-04-27 RX ADMIN — MIRTAZAPINE 15 MILLIGRAM(S): 45 TABLET, ORALLY DISINTEGRATING ORAL at 22:14

## 2022-04-27 NOTE — PROGRESS NOTE ADULT - SUBJECTIVE AND OBJECTIVE BOX
[facility] Medicine  Pager [phone]    Patient is a 21y old  Female who presents with a chief complaint of COVID (26 Apr 2022 12:19)      INTERVAL HPI/OVERNIGHT EVENTS:    MEDICATIONS  (STANDING):  atorvastatin 10 milliGRAM(s) Oral at bedtime  BACItracin   Ointment 1 Application(s) Topical two times a day  DULoxetine 90 milliGRAM(s) Oral daily  enoxaparin Injectable 40 milliGRAM(s) SubCutaneous every 24 hours  loratadine 10 milliGRAM(s) Oral daily  mirtazapine 15 milliGRAM(s) Oral at bedtime  Vestura: Ethinyl estradiol 0.02 mg and drospirenone 3 mg 1 Tablet(s) 1 Tablet(s) Oral daily    MEDICATIONS  (PRN):  acetaminophen     Tablet .. 650 milliGRAM(s) Oral every 6 hours PRN Temp greater or equal to 38C (100.4F), Mild Pain (1 - 3)  benzonatate 200 milliGRAM(s) Oral every 8 hours PRN Cough  hydrOXYzine hydrochloride 25 milliGRAM(s) Oral every 6 hours PRN Anxiety  melatonin 6 milliGRAM(s) Oral at bedtime PRN Insomnia      Allergies    No Known Allergies    Intolerances        REVIEW OF SYSTEMS:  Please see interval HPI:    Vital Signs Last 24 Hrs  T(C): 36.6 (27 Apr 2022 05:40), Max: 36.6 (27 Apr 2022 05:40)  T(F): 97.8 (27 Apr 2022 05:40), Max: 97.8 (27 Apr 2022 05:40)  HR: 94 (27 Apr 2022 05:40) (94 - 98)  BP: 104/62 (27 Apr 2022 05:40) (102/67 - 104/62)  BP(mean): --  RR: 18 (27 Apr 2022 05:40) (18 - 18)  SpO2: 98% (27 Apr 2022 05:40) (98% - 98%)  I&O's Detail        PHYSICAL EXAM:  GENERAL:   HEAD:    EYES:   ENMT:   NECK:   NERVOUS SYSTEM:    CHEST/LUNG:   HEART:   ABDOMEN:   EXTREMITIES:    LYMPH:   SKIN:     LABS:      Ca    8.6        26 Apr 2022 06:50        CAPILLARY BLOOD GLUCOSE        BLOOD CULTURE    RADIOLOGY & ADDITIONAL TESTS:    Imaging Personally Reviewed:  [ ] YES     Consultant(s) Notes Reviewed:      Care Discussed with Consultants/Other Providers: [facility] Medicine  Pager [phone]    Patient is a 21y old  Female who presents with a chief complaint of COVID (26 Apr 2022 12:19)    INTERVAL HPI/OVERNIGHT EVENTS:  Denies cough/SOB, no acute medical complaints. Does report some increasing anxiety, is unsure if she would feel safe at home, interested in speaking to psych further...     Declines offer to update family today.     MEDICATIONS  (STANDING):  atorvastatin 10 milliGRAM(s) Oral at bedtime  BACItracin   Ointment 1 Application(s) Topical two times a day  DULoxetine 90 milliGRAM(s) Oral daily  enoxaparin Injectable 40 milliGRAM(s) SubCutaneous every 24 hours  loratadine 10 milliGRAM(s) Oral daily  mirtazapine 15 milliGRAM(s) Oral at bedtime  Vestura: Ethinyl estradiol 0.02 mg and drospirenone 3 mg 1 Tablet(s) 1 Tablet(s) Oral daily    MEDICATIONS  (PRN):  acetaminophen     Tablet .. 650 milliGRAM(s) Oral every 6 hours PRN Temp greater or equal to 38C (100.4F), Mild Pain (1 - 3)  benzonatate 200 milliGRAM(s) Oral every 8 hours PRN Cough  hydrOXYzine hydrochloride 25 milliGRAM(s) Oral every 6 hours PRN Anxiety  melatonin 6 milliGRAM(s) Oral at bedtime PRN Insomnia    Allergies  No Known Allergies    Intolerances    REVIEW OF SYSTEMS:  Please see interval HPI:    Vital Signs Last 24 Hrs  T(C): 36.6 (27 Apr 2022 05:40), Max: 36.6 (27 Apr 2022 05:40)  T(F): 97.8 (27 Apr 2022 05:40), Max: 97.8 (27 Apr 2022 05:40)  HR: 94 (27 Apr 2022 05:40) (94 - 98)  BP: 104/62 (27 Apr 2022 05:40) (102/67 - 104/62)  RR: 18 (27 Apr 2022 05:40) (18 - 18)  SpO2: 98% (27 Apr 2022 05:40) (98% - 98%)  I&O's Detail    PHYSICAL EXAM:  GENERAL: NAD, lying in bed, arousable to voice  HEAD:  NC/AT  EYES: EOMI, clear sclera/conjunctiva  ENMT: MMM  NECK: supple  NERVOUS SYSTEM: non-focal, pleasant    CHEST/LUNG: Comfortable on RA, speaking in full sentences  EXTREMITIES:  no c/c/e      LABS:                        13.1   4.57  )-----------( 137      ( 26 Apr 2022 06:50 )             38.1     04-26    139  |  104  |  11  ----------------------------<  85  3.5   |  23  |  0.58    Ca    8.6      26 Apr 2022 06:50  Mg     2.10     04-26    CAPILLARY BLOOD GLUCOSE    BLOOD CULTURE    RADIOLOGY & ADDITIONAL TESTS:    Imaging Personally Reviewed:  [ ] YES     Consultant(s) Notes Reviewed:      Care Discussed with Consultants/Other Providers:  Dr. Lara re: ongoing discussions re: dispo, patient anxious about home, to determine need for [facility] or if can consider home ?with partial program? TERENCE Montana re: pending psych safety reassessment, dispo TBD

## 2022-04-27 NOTE — BH CONSULTATION LIAISON PROGRESS NOTE - NSBHCHARTREVIEWLAB_PSY_A_CORE FT
LABS:                        13.2   4.52  )-----------( 142      ( 25 Apr 2022 07:26 )             38.1     04-25    138  |  102  |  10  ----------------------------<  84  3.4<L>   |  24  |  0.68    Ca    8.9      25 Apr 2022 07:26  Phos  4.0     04-25  Mg     2.10     04-25              
LABS:                        13.1   4.57  )-----------( 137      ( 26 Apr 2022 06:50 )             38.1     04-26    139  |  104  |  11  ----------------------------<  85  3.5   |  23  |  0.58    Ca    8.6      26 Apr 2022 06:50  Mg     2.10     04-26

## 2022-04-27 NOTE — BH CONSULTATION LIAISON PROGRESS NOTE - NSBHASSESSMENTFT_PSY_ALL_CORE
"Patient is a 21-year-old female, single, non-caregiver residing with her parents, currently taking a leave of absence from [employer], with PPhx anxiety, panic and depression with long hx of outpatient treatment (currently in treatment with NP Jose Mccann), no history of violence, no legal involvement, no self-injurious behaviors, no suicide attempts, 2 prior psychiatric hospitalization recently at [facility] from 2/28-3/9/22 and 3/31-4/12 no access to guns/weapons, who presented to partial appointment on 4/18 after SA via alcohol and pills on 4/16. Patient brought over for voluntary admission." Sent to [facility] for COVID + test result.    4/25 - Pt is calm, future oriented, no SI/SIB/HI or AVH, reports passive SI over weekend. Denies feeling depressed or anxious. Sleeping well. Currently with mild URI Sx.    4/27 - Pt remains calm, anxious with tx planning. Endorses fleeting passive SI, no active SIIP, SIB urges, or HIIP.     PLAN  - no psych indication for CO, Pt in appropriate behavioral control, fleeting passive SI, no SIB urges, no HIIP.  - continue Duloxetine 90 mg daily (depression) - Home medication  - discontinue Clonazepam 0.5 mg QHS (insomnia) - Home medication - patient has been refusing this medication at [facility] and at [facility]. will monitor sleep/anxiety while medically admitted  - continue Mirtazapine 15 mg QHS (insomnia) - Home medication, started 4/20, increased to 15mg 4/22  - patient on CO for recent SA  - PRN for anxiety: vistaril 25mg q6hrs   - dispo pending as patient COVID + and requires isolation -- will collaborate with [facility] regarding safe discharge planning as Pt will likely discharge from [facility] if she remains psychiatrically stable

## 2022-04-28 PROCEDURE — 99232 SBSQ HOSP IP/OBS MODERATE 35: CPT

## 2022-04-28 RX ADMIN — Medication 1 APPLICATION(S): at 17:38

## 2022-04-28 RX ADMIN — DULOXETINE HYDROCHLORIDE 90 MILLIGRAM(S): 30 CAPSULE, DELAYED RELEASE ORAL at 12:47

## 2022-04-28 RX ADMIN — MIRTAZAPINE 15 MILLIGRAM(S): 45 TABLET, ORALLY DISINTEGRATING ORAL at 21:29

## 2022-04-28 RX ADMIN — LORATADINE 10 MILLIGRAM(S): 10 TABLET ORAL at 12:47

## 2022-04-28 RX ADMIN — Medication 1 APPLICATION(S): at 06:26

## 2022-04-28 RX ADMIN — ENOXAPARIN SODIUM 40 MILLIGRAM(S): 100 INJECTION SUBCUTANEOUS at 17:39

## 2022-04-28 RX ADMIN — ATORVASTATIN CALCIUM 10 MILLIGRAM(S): 80 TABLET, FILM COATED ORAL at 21:29

## 2022-04-28 NOTE — PROGRESS NOTE ADULT - SUBJECTIVE AND OBJECTIVE BOX
[facility] Medicine  Pager [phone]    Patient is a 21y old  Female who presents with a chief complaint of COVID (27 Apr 2022 11:26)      INTERVAL HPI/OVERNIGHT EVENTS:  Still feels a little anxious, unsure if feels safe going home, wishes to speak further to psych. Otherwise without acute complaints (no cough/SOB). Has few activities ([employer] game) to keep self occupied while here in the hospital. Requests to take a shower.     MEDICATIONS  (STANDING):  atorvastatin 10 milliGRAM(s) Oral at bedtime  BACItracin   Ointment 1 Application(s) Topical two times a day  DULoxetine 90 milliGRAM(s) Oral daily  enoxaparin Injectable 40 milliGRAM(s) SubCutaneous every 24 hours  loratadine 10 milliGRAM(s) Oral daily  mirtazapine 15 milliGRAM(s) Oral at bedtime  Vestura: Ethinyl estradiol 0.02 mg and drospirenone 3 mg 1 Tablet(s) 1 Tablet(s) Oral daily    MEDICATIONS  (PRN):  acetaminophen     Tablet .. 650 milliGRAM(s) Oral every 6 hours PRN Temp greater or equal to 38C (100.4F), Mild Pain (1 - 3)  benzonatate 200 milliGRAM(s) Oral every 8 hours PRN Cough  hydrOXYzine hydrochloride 25 milliGRAM(s) Oral every 6 hours PRN Anxiety  melatonin 6 milliGRAM(s) Oral at bedtime PRN Insomnia    Allergies  No Known Allergies    Intolerances    REVIEW OF SYSTEMS:  Please see interval HPI:    Vital Signs Last 24 Hrs  T(C): 36.9 (28 Apr 2022 12:44), Max: 36.9 (28 Apr 2022 12:44)  T(F): 98.4 (28 Apr 2022 12:44), Max: 98.4 (28 Apr 2022 12:44)  HR: 93 (28 Apr 2022 12:44) (92 - 98)  BP: 128/81 (28 Apr 2022 12:44) (106/69 - 128/81)  BP(mean): --  RR: 18 (28 Apr 2022 12:44) (15 - 18)  SpO2: 99% (28 Apr 2022 12:44) (98% - 99%)  I&O's Detail        PHYSICAL EXAM:  GENERAL: NAD, lying in bed, arousable to voice  HEAD:  NC/AT  EYES: EOMI, clear sclera/conjunctiva  ENMT: MMM  NECK: supple  NERVOUS SYSTEM: non-focal, pleasant    CHEST/LUNG: Comfortable on RA, speaking in full sentences  EXTREMITIES:  no c/c/e    LABS:    CAPILLARY BLOOD GLUCOSE    BLOOD CULTURE    RADIOLOGY & ADDITIONAL TESTS:    Imaging Personally Reviewed:  [ ] YES     Consultant(s) Notes Reviewed:      Care Discussed with Consultants/Other Providers: TERENCE Soto re: dispo pending psych reassessment

## 2022-04-29 DIAGNOSIS — Z29.9 ENCOUNTER FOR PROPHYLACTIC MEASURES, UNSPECIFIED: ICD-10-CM

## 2022-04-29 DIAGNOSIS — F32.A DEPRESSION, UNSPECIFIED: ICD-10-CM

## 2022-04-29 LAB — SARS-COV-2 RNA SPEC QL NAA+PROBE: DETECTED

## 2022-04-29 PROCEDURE — 99233 SBSQ HOSP IP/OBS HIGH 50: CPT

## 2022-04-29 PROCEDURE — 99232 SBSQ HOSP IP/OBS MODERATE 35: CPT

## 2022-04-29 RX ADMIN — MIRTAZAPINE 15 MILLIGRAM(S): 45 TABLET, ORALLY DISINTEGRATING ORAL at 21:10

## 2022-04-29 RX ADMIN — Medication 1 APPLICATION(S): at 17:30

## 2022-04-29 RX ADMIN — ATORVASTATIN CALCIUM 10 MILLIGRAM(S): 80 TABLET, FILM COATED ORAL at 21:09

## 2022-04-29 RX ADMIN — DULOXETINE HYDROCHLORIDE 90 MILLIGRAM(S): 30 CAPSULE, DELAYED RELEASE ORAL at 13:10

## 2022-04-29 RX ADMIN — Medication 1 APPLICATION(S): at 05:52

## 2022-04-29 RX ADMIN — ENOXAPARIN SODIUM 40 MILLIGRAM(S): 100 INJECTION SUBCUTANEOUS at 17:31

## 2022-04-29 RX ADMIN — LORATADINE 10 MILLIGRAM(S): 10 TABLET ORAL at 13:10

## 2022-04-29 NOTE — PROGRESS NOTE ADULT - SUBJECTIVE AND OBJECTIVE BOX
[facility] Medicine  Pager [phone]    Patient is a 21y old  Female who presents with a chief complaint of COVID (28 Apr 2022 13:55)      INTERVAL HPI/OVERNIGHT EVENTS:    MEDICATIONS  (STANDING):  atorvastatin 10 milliGRAM(s) Oral at bedtime  BACItracin   Ointment 1 Application(s) Topical two times a day  DULoxetine 90 milliGRAM(s) Oral daily  enoxaparin Injectable 40 milliGRAM(s) SubCutaneous every 24 hours  loratadine 10 milliGRAM(s) Oral daily  mirtazapine 15 milliGRAM(s) Oral at bedtime  Vestura: Ethinyl estradiol 0.02 mg and drospirenone 3 mg 1 Tablet(s) 1 Tablet(s) Oral daily    MEDICATIONS  (PRN):  acetaminophen     Tablet .. 650 milliGRAM(s) Oral every 6 hours PRN Temp greater or equal to 38C (100.4F), Mild Pain (1 - 3)  benzonatate 200 milliGRAM(s) Oral every 8 hours PRN Cough  hydrOXYzine hydrochloride 25 milliGRAM(s) Oral every 6 hours PRN Anxiety  melatonin 6 milliGRAM(s) Oral at bedtime PRN Insomnia      Allergies    No Known Allergies    Intolerances        REVIEW OF SYSTEMS:  Please see interval HPI:    Vital Signs Last 24 Hrs  T(C): 36.7 (29 Apr 2022 05:54), Max: 36.8 (28 Apr 2022 21:29)  T(F): 98.1 (29 Apr 2022 05:54), Max: 98.2 (28 Apr 2022 21:29)  HR: 86 (29 Apr 2022 05:54) (86 - 98)  BP: 103/57 (29 Apr 2022 05:54) (103/57 - 111/70)  BP(mean): --  RR: 17 (29 Apr 2022 05:54) (17 - 18)  SpO2: 97% (29 Apr 2022 05:54) (97% - 99%)  I&O's Detail        PHYSICAL EXAM:  GENERAL:   HEAD:    EYES:   ENMT:   NECK:   NERVOUS SYSTEM:    CHEST/LUNG:   HEART:   ABDOMEN:   EXTREMITIES:    LYMPH:   SKIN:     LABS:            CAPILLARY BLOOD GLUCOSE        BLOOD CULTURE    RADIOLOGY & ADDITIONAL TESTS:    Imaging Personally Reviewed:  [ ] YES     Consultant(s) Notes Reviewed:      Care Discussed with Consultants/Other Providers: [facility] Medicine  Pager [phone]    Patient is a 21y old  Female who presents with a chief complaint of COVID (28 Apr 2022 13:55)      INTERVAL HPI/OVERNIGHT EVENTS:  Had discussed w/ psych, would feel more comfortable going back to [facility], aware of need to complete isolation period prior to return to [facility], otherwise no major complaints, denies cough/SOB. Has small abrasion on L index finger near knuckle (reports caught on the faucet), denies fever/chills/drainage, no restrictions in range of motion, has been applying antibacterial ointment and covering.     MEDICATIONS  (STANDING):  atorvastatin 10 milliGRAM(s) Oral at bedtime  BACItracin   Ointment 1 Application(s) Topical two times a day  DULoxetine 90 milliGRAM(s) Oral daily  enoxaparin Injectable 40 milliGRAM(s) SubCutaneous every 24 hours  loratadine 10 milliGRAM(s) Oral daily  mirtazapine 15 milliGRAM(s) Oral at bedtime  Vestura: Ethinyl estradiol 0.02 mg and drospirenone 3 mg 1 Tablet(s) 1 Tablet(s) Oral daily    MEDICATIONS  (PRN):  acetaminophen     Tablet .. 650 milliGRAM(s) Oral every 6 hours PRN Temp greater or equal to 38C (100.4F), Mild Pain (1 - 3)  benzonatate 200 milliGRAM(s) Oral every 8 hours PRN Cough  hydrOXYzine hydrochloride 25 milliGRAM(s) Oral every 6 hours PRN Anxiety  melatonin 6 milliGRAM(s) Oral at bedtime PRN Insomnia    Allergies  No Known Allergies    Intolerances    REVIEW OF SYSTEMS:  Please see interval HPI:    Vital Signs Last 24 Hrs  T(C): 36.7 (29 Apr 2022 05:54), Max: 36.8 (28 Apr 2022 21:29)  T(F): 98.1 (29 Apr 2022 05:54), Max: 98.2 (28 Apr 2022 21:29)  HR: 86 (29 Apr 2022 05:54) (86 - 98)  BP: 103/57 (29 Apr 2022 05:54) (103/57 - 111/70)  RR: 17 (29 Apr 2022 05:54) (17 - 18)  SpO2: 97% (29 Apr 2022 05:54) (97% - 99%)  I&O's Detail      PHYSICAL EXAM:  GENERAL: NAD, sitting in bed doing a puzzle  HEAD:  NC/AT  EYES: EOMI, clear sclera/conjunctiva  ENMT: MMM  NECK: supple  NERVOUS SYSTEM: non-focal, pleasant    CHEST/LUNG: Comfortable on RA, speaking in full sentences  EXTREMITIES:  no c/c/e    LABS:    CAPILLARY BLOOD GLUCOSE    BLOOD CULTURE    RADIOLOGY & ADDITIONAL TESTS:    Imaging Personally Reviewed:  [ ] YES     Consultant(s) Notes Reviewed:      Care Discussed with Consultants/Other Providers:  Dr. Peralta re: psych recommendations for patient to return to [facility] pending completion of isolation procedures (patient not comfortable/did not feel safe with going home). ACP Brittany/Olivia re: plan for inpatient [facility] after isolation done

## 2022-04-29 NOTE — PROVIDER CONTACT NOTE (OTHER) - NAME OF MD/NP/PA/DO NOTIFIED:
Melanie Clements
Nela Anaya
Sienna, Emi
Tele ELIU España
Nela Anaya
TERENCE Alvarado
Aracelis Gustafson
Olivia Bal

## 2022-04-29 NOTE — PROVIDER CONTACT NOTE (OTHER) - ACTION/TREATMENT ORDERED:
provider notified.
provider notified.
provider notified. no new orders.
provider notified
provider notified.
provider notified.
No new orders made.
provider notified. no new orders.

## 2022-04-29 NOTE — BH CONSULTATION LIAISON PROGRESS NOTE - CASE SUMMARY
Chart reviewed. Pt seen with trainee. AA O x 3, in NAD. Denies SI, safety concerns. Forward-thinking about psych disposition planning, though highly selective about which day program to attend. No focal neuro deficits. Will continue to see and determine appropriate dispo planning. Agree with above recs and discussed with primary medical team. 
Chart reviewed. Agree with above assessment/recs. Pt not comfortable with outpatient [facility] virtual-based day program and wants to return to inpatient psych when COVID isolation expires (next week). She does not feel safe going home. See above for plan. We will not round on pt over weekend (low psych acuity); please call x [phone] if needed, otherwise will revisit on 5/2.
Chart reviewed. Pt seen with trainee. AA o x 3, calm, in no distress. Depressed but wants to work on this in inpatient psych when able to return. Agree with above assessment. Will continue to evaluate if inpatient psych level of care is needed for this patient.

## 2022-04-29 NOTE — DIETITIAN INITIAL EVALUATION ADULT - PERTINENT MEDS FT
MEDICATIONS  (STANDING):  atorvastatin 10 milliGRAM(s) Oral at bedtime  BACItracin   Ointment 1 Application(s) Topical two times a day  DULoxetine 90 milliGRAM(s) Oral daily  enoxaparin Injectable 40 milliGRAM(s) SubCutaneous every 24 hours  loratadine 10 milliGRAM(s) Oral daily  mirtazapine 15 milliGRAM(s) Oral at bedtime  Vestura: Ethinyl estradiol 0.02 mg and drospirenone 3 mg 1 Tablet(s) 1 Tablet(s) Oral daily    MEDICATIONS  (PRN):  acetaminophen     Tablet .. 650 milliGRAM(s) Oral every 6 hours PRN Temp greater or equal to 38C (100.4F), Mild Pain (1 - 3)  benzonatate 200 milliGRAM(s) Oral every 8 hours PRN Cough  hydrOXYzine hydrochloride 25 milliGRAM(s) Oral every 6 hours PRN Anxiety  melatonin 6 milliGRAM(s) Oral at bedtime PRN Insomnia

## 2022-04-29 NOTE — PROVIDER CONTACT NOTE (OTHER) - BACKGROUND
Admitted to maintain isolation for COVID infection
patient admitted for suicide attempt.
patient admitted for suicide attempt.
pt came in from [facility] and was found to be covid positive
patient admitted to maintain isolation for COVID infection
patient admitted for suicide attempt.
pt came in from [facility] and was found to be covid positive
patient admitted for suicide attempt.

## 2022-04-29 NOTE — PROVIDER CONTACT NOTE (OTHER) - RECOMMENDATIONS
notify provider.
provider made aware.
Re-enforced patient education regarding the importance of obtaining ordered blood work. Provider notified.
notify provider.
provider made aware.
notify provider.
notify provider
notify provider.

## 2022-04-29 NOTE — PROVIDER CONTACT NOTE (OTHER) - REASON
pt refusing iv
pt refusing klonopin
Refusing lab work
pt refusing iv
patient refusing IV access
patient refused am bloodwork
patient refusing IV access
pt refuses klonopin

## 2022-04-29 NOTE — BH CONSULTATION LIAISON PROGRESS NOTE - NSBHASSESSMENTFT_PSY_ALL_CORE
"Patient is a 21-year-old female, single, non-caregiver residing with her parents, currently taking a leave of absence from [employer], with PPhx anxiety, panic and depression with long hx of outpatient treatment (currently in treatment with ELIDA Mccann), no history of violence, no legal involvement, no self-injurious behaviors, no suicide attempts, 2 prior psychiatric hospitalization recently at [facility] 1S from 2/28-3/9/22 and 3/31-4/12 no access to guns/weapons, who presented to partial appointment on 4/18 after SA via alcohol and pills on 4/16. Patient brought over for voluntary admission." Sent to [facility] for COVID + test result. During this admission, Pt has remained calm, future oriented, in appropriate behavioral control. Reports feeling anxious at times with fleeting passive suicidal ideation. Denies active suicidal ideation, intent, or plan, urges for self-injurious behaviors, HIIP, and AVH.    PLAN  - no psych indication for CO, Pt in appropriate behavioral control, fleeting passive SI (denies today), no SIB urges, no HIIP.  - continue Duloxetine 90 mg daily (depression) - Home medication  - discontinued Clonazepam (home medication)  - continue Mirtazapine 15 mg QHS (insomnia) - Home medication, started 4/20, increased to 15mg 4/22  - PRN for anxiety: Atarax 25mg q6hrs   - dispo pending as patient COVID + and requires isolation -- Pt and family prefers inpatient [facility] hospitalization for further stabilization given multiple psych admissions and recent suicide attempt

## 2022-04-29 NOTE — PROVIDER CONTACT NOTE (OTHER) - ASSESSMENT
Patient in bed, A&O 4. Denies chest pain or SOB. No s/s of acute distress noted.
patient AOx4, in no acute distress
patient AOx4, in no acute distress.
pt refuses klonopin.
pt refuses klonopin.

## 2022-04-29 NOTE — DIETITIAN INITIAL EVALUATION ADULT - PERTINENT LABORATORY DATA
A1C with Estimated Average Glucose Result: 4.6 % (04-19-22 @ 11:13)  A1C with Estimated Average Glucose Result: 4.7 % (03-01-22 @ 10:31)

## 2022-04-29 NOTE — PROVIDER CONTACT NOTE (OTHER) - DATE AND TIME:
23-Apr-2022 22:39
25-Apr-2022 04:02
25-Apr-2022 04:01
28-Apr-2022 09:51
24-Apr-2022 14:58
26-Apr-2022 19:45
26-Apr-2022 06:30
29-Apr-2022 09:15

## 2022-04-29 NOTE — DIETITIAN INITIAL EVALUATION ADULT - OTHER INFO
22 y/o female admitted with dx Covid infection. Pt transferred from [facility] where she was initially assessed by RDN on 4/19/22. Pt was nutritionally diagnosed with Moderate Malnutrition based on significant weight loss of 8 lbs over 1 month PTA with persistently fair/poor oral intake. Moderate Malnutrition diagnosis continues as pt has fair oral intake to present. No food preferences verbalized. She declines oral supplements. No GI distress with last BM 4/26. Encourage oral intake as needed. RDN services to remain available as needed.

## 2022-04-29 NOTE — PROVIDER CONTACT NOTE (OTHER) - SITUATION
patient refused am bloodwork
patient IV was leaking, IV was removed and patient is refusing new IV.
patient IV was leaking, IV was removed and patient is refusing new IV.
Patient refused ordered AM blood work
pt refuses klonopin
pt refuses klonopin
patient IV was leaking, IV was removed and patient is refusing new IV.
pt refusing IV change

## 2022-04-30 PROCEDURE — 99232 SBSQ HOSP IP/OBS MODERATE 35: CPT

## 2022-04-30 RX ADMIN — LORATADINE 10 MILLIGRAM(S): 10 TABLET ORAL at 12:31

## 2022-04-30 RX ADMIN — MIRTAZAPINE 15 MILLIGRAM(S): 45 TABLET, ORALLY DISINTEGRATING ORAL at 21:28

## 2022-04-30 RX ADMIN — ENOXAPARIN SODIUM 40 MILLIGRAM(S): 100 INJECTION SUBCUTANEOUS at 17:31

## 2022-04-30 RX ADMIN — ATORVASTATIN CALCIUM 10 MILLIGRAM(S): 80 TABLET, FILM COATED ORAL at 21:28

## 2022-04-30 RX ADMIN — DULOXETINE HYDROCHLORIDE 90 MILLIGRAM(S): 30 CAPSULE, DELAYED RELEASE ORAL at 12:31

## 2022-04-30 RX ADMIN — Medication 1 APPLICATION(S): at 05:49

## 2022-04-30 NOTE — PROGRESS NOTE ADULT - SUBJECTIVE AND OBJECTIVE BOX
[facility] Division of Hospital Medicine  José Luis Call) MD Yaya  Pager [phone]    SUBJECTIVE:  Chief complaint: SI.    Pt seen and evaluated at bedside. No o/n events. Still w/ periods of anxiety. States she has not taken PRNs. Otherwise, tolerating PO. No bowel/urinary complaints.      ROS: All systems negative except as noted.      Vital Signs Last 24 Hrs  T(C): 36.7 (30 Apr 2022 12:31), Max: 36.7 (30 Apr 2022 05:51)  T(F): 98.1 (30 Apr 2022 12:31), Max: 98.1 (30 Apr 2022 05:51)  HR: 82 (30 Apr 2022 12:31) (81 - 92)  BP: 112/70 (30 Apr 2022 12:31) (112/70 - 118/70)  BP(mean): --  RR: 16 (30 Apr 2022 12:31) (16 - 16)  SpO2: 97% (30 Apr 2022 12:31) (97% - 99%)      PHYSICAL EXAM:  Gen- In bed, comfortable, NAD  GI- Soft abd, NT, ND, +BSx4. No HSM.  MSK- No C/C. ROM intact. No crepitus.  Neuro- CN II-XII intact. Speech fluent/face symmetric. Sensation intact.  Skin- No rashes/ulcers. Warm/moist.  Psych- AAOx3. Appropriate mood/affect.      MEDICATION:  MEDICATIONS  (STANDING):  atorvastatin 10 milliGRAM(s) Oral at bedtime  DULoxetine 90 milliGRAM(s) Oral daily  enoxaparin Injectable 40 milliGRAM(s) SubCutaneous every 24 hours  loratadine 10 milliGRAM(s) Oral daily  mirtazapine 15 milliGRAM(s) Oral at bedtime  Vestura: Ethinyl estradiol 0.02 mg and drospirenone 3 mg 1 Tablet(s) 1 Tablet(s) Oral daily    MEDICATIONS  (PRN):  acetaminophen     Tablet .. 650 milliGRAM(s) Oral every 6 hours PRN Temp greater or equal to 38C (100.4F), Mild Pain (1 - 3)  benzonatate 200 milliGRAM(s) Oral every 8 hours PRN Cough  hydrOXYzine hydrochloride 25 milliGRAM(s) Oral every 6 hours PRN Anxiety  melatonin 6 milliGRAM(s) Oral at bedtime PRN Insomnia            LABORATORY:    COVID-19 PCR: Detected (29 Apr 2022 18:47)  SARS-CoV-2: Detected (22 Apr 2022 14:41)  COVID-19 PCR: NotDetec (03 Nov 2021 22:37)

## 2022-05-01 PROCEDURE — 99232 SBSQ HOSP IP/OBS MODERATE 35: CPT

## 2022-05-01 RX ADMIN — DULOXETINE HYDROCHLORIDE 90 MILLIGRAM(S): 30 CAPSULE, DELAYED RELEASE ORAL at 11:19

## 2022-05-01 RX ADMIN — ATORVASTATIN CALCIUM 10 MILLIGRAM(S): 80 TABLET, FILM COATED ORAL at 21:37

## 2022-05-01 RX ADMIN — LORATADINE 10 MILLIGRAM(S): 10 TABLET ORAL at 11:20

## 2022-05-01 RX ADMIN — Medication 650 MILLIGRAM(S): at 10:31

## 2022-05-01 RX ADMIN — MIRTAZAPINE 15 MILLIGRAM(S): 45 TABLET, ORALLY DISINTEGRATING ORAL at 21:37

## 2022-05-01 RX ADMIN — Medication 650 MILLIGRAM(S): at 11:31

## 2022-05-01 RX ADMIN — ENOXAPARIN SODIUM 40 MILLIGRAM(S): 100 INJECTION SUBCUTANEOUS at 17:46

## 2022-05-01 NOTE — PROGRESS NOTE ADULT - SUBJECTIVE AND OBJECTIVE BOX
[facility] Division of Hospital Medicine  José Luis Call) MD Yaya  Pager [phone]    SUBJECTIVE:  Chief complaint: SI.    Pt seen and evaluated at bedside. No o/n events. Had a visitor present. No new complaints. Tolerating PO. no bowel/urinary complaints. States she still wants to go to [facility] for optimization.      ROS: All systems negative except as noted.      Vital Signs Last 24 Hrs  T(C): 37.2 (01 May 2022 11:19), Max: 37.2 (01 May 2022 11:19)  T(F): 98.9 (01 May 2022 11:19), Max: 98.9 (01 May 2022 11:19)  HR: 92 (01 May 2022 11:19) (75 - 100)  BP: 125/70 (01 May 2022 11:19) (104/60 - 125/70)  BP(mean): --  RR: 18 (01 May 2022 11:19) (17 - 18)  SpO2: 97% (01 May 2022 11:19) (97% - 98%)      PHYSICAL EXAM:  Gen- In bed, comfortable, NAD  GI- Soft abd, NT, ND, +BSx4. No HSM.  MSK- No C/C. ROM intact. No crepitus.  Neuro- CN II-XII intact. Speech fluent/face symmetric. Sensation intact.  Skin- No rashes/ulcers. Warm/moist.  Psych- AAOx3. Appropriate mood/affect.      MEDICATION:  MEDICATIONS  (STANDING):  atorvastatin 10 milliGRAM(s) Oral at bedtime  DULoxetine 90 milliGRAM(s) Oral daily  enoxaparin Injectable 40 milliGRAM(s) SubCutaneous every 24 hours  loratadine 10 milliGRAM(s) Oral daily  mirtazapine 15 milliGRAM(s) Oral at bedtime  Vestura: Ethinyl estradiol 0.02 mg and drospirenone 3 mg 1 Tablet(s) 1 Tablet(s) Oral daily    MEDICATIONS  (PRN):  acetaminophen     Tablet .. 650 milliGRAM(s) Oral every 6 hours PRN Temp greater or equal to 38C (100.4F), Mild Pain (1 - 3)  benzonatate 200 milliGRAM(s) Oral every 8 hours PRN Cough  hydrOXYzine hydrochloride 25 milliGRAM(s) Oral every 6 hours PRN Anxiety  melatonin 6 milliGRAM(s) Oral at bedtime PRN Insomnia      LABORATORY:    COVID-19 PCR: Detected (29 Apr 2022 18:47)  SARS-CoV-2: Detected (22 Apr 2022 14:41)  COVID-19 PCR: NotDetec (03 Nov 2021 22:37)

## 2022-05-01 NOTE — PROGRESS NOTE ADULT - PROBLEM SELECTOR PROBLEM 2
Depression with suicidal ideation
Major depression
Depression with suicidal ideation
Major depression
Depression with suicidal ideation

## 2022-05-01 NOTE — PROGRESS NOTE ADULT - PROBLEM SELECTOR PLAN 2
# Depression w/ suicidal ideation  - had suicide attempt 4/16 (wine, klonopin, trazodone, melatonin)  - was on 1:1, c/w duloxetine, remeron with atarax prn anxiety  - appreciate psych input, plan is for patient to return to [facility] for further psychiatric management (patient did not feel safe going home w/ day program, patient/family prefers return to [facility] d/t multiple psych hospitalizations and recent suicide attempt), await completion of isolation period
# Depression w/ suicidal ideation  - had suicide attempt 4/16 (wine, klonopin, trazodone, melatonin)  - was on 1:1, c/w duloxetine, remeron with atarax prn anxiety  - appreciate psych input, plan is for patient to return to [facility] for further psychiatric management (patient did not feel safe going home w/ day program, patient/family prefers return to [facility] d/t multiple psych hospitalizations and recent suicide attempt), await completion of isolation period
# Depression w/ suicidal ideation  - had suicide attempt 4/16 (wine, klonopin, trazodone, melatonin)  - was on 1:1, c/w duloxetine, remeron with atarax prn anxiety  - appreciate psych input, plan is for patient to return to [facility] for further psychiatric management (patient did not feel safe going home w/ day program, patient/family prefers return to [facility] d/t multiple psych hospitalizations and recent suicide attempt), await completion of isolation period
- Admitted to [facility] after suicide attempt on 4/18.   - S/p drank 6-8oz of wine and took .5mg clonopin, 25mg trazodone and 5mg melatonin on 4/16 before going to sleep. She is upset the SA was unsuccessful and told her parents on 4/17  - denies SI/HI at this time  - maintain patient safety w/1:1 per psych recs  - Will continue to monitor and support patient  - continue with Cymbalta 90mg, Remeron  -DC clonazepam per psych recs
- Admitted to [facility] after suicide attempt on 4/18.   - S/p drank 6-8oz of wine and took .5mg clonopin, 25mg trazodone and 5mg melatonin on 4/16 before going to sleep. She is upset the SA was unsuccessful and told her parents on 4/17  - denies SI/HI at this time  - maintain patient safety w/1:1 per psych recs  - Will continue to monitor and support patient  - continue with Cymbalta 90mg, Remeron  -DC clonazepam per psych recs  -Patient stated she will discuss her remeron dosing with the Psychiatry team. Current dose with feelings of grogginess if patient unable to get a full nights sleep

## 2022-05-01 NOTE — PROGRESS NOTE ADULT - PROBLEM SELECTOR PROBLEM 4
DVT prophylaxis
Need for prophylactic measure
Need for prophylactic measure
DVT prophylaxis
Need for prophylactic measure

## 2022-05-01 NOTE — PROGRESS NOTE ADULT - PROBLEM SELECTOR PLAN 1
- COVID-19 PCR: Positive, unable to stay at [facility] due to (+)COVID, transferred to [facility]  - Isolation precautions per hospital protocol   - Patient with upper respiratory symptoms including sore throat, rhinorrhea, nasal congestion, mild HA, malaise and body aches.   - CXR with clear lungs   - Pt currently on RA sating > 95%   - Inflammatory markers unremarkable; d-dimer, procal, and Ferritin within normal limits. CRP elevated to 20.8  - monitor SP02 q 8 hours   - Incentive spirometry  - tessalon pearls prn for cough  - Tylenol prn
# COVID19  - unable to stay at [facility] due to COVID+ status  - with minimal upper respiratory symptoms, denies SOB, is not hypoxic  - does not require steroids/remdesivir at this time  - c/w symptomatic management of cough/supportive care  - ddimer <150
- COVID-19 PCR: Positive, unable to stay at [facility] due to (+)COVID, transferred to [facility]  - Isolation precautions per hospital protocol   - Patient with upper respiratory symptoms including sore throat, rhinorrhea, nasal congestion, mild HA, malaise and body aches.   - CXR with clear lungs   - Pt currently on RA sating > 95%   - Inflammatory markers unremarkable; d-dimer, procal, and Ferritin within normal limits. CRP elevated to 20.8  - monitor SP02 q 8 hours   - Incentive spirometry  - tessalon pearls prn for cough  - Tylenol prn
# COVID19  - unable to stay at [facility] due to COVID+ status  - with minimal upper respiratory symptoms, denies SOB, is not hypoxic  - does not require steroids/remdesivir at this time  - c/w symptomatic management of cough/supportive care  - ddimer <150  - Asymptomatic- no changes to plan.
# COVID19  - unable to stay at [facility] due to COVID+ status  - with minimal upper respiratory symptoms, denies SOB, is not hypoxic  - does not require steroids/remdesivir at this time  - c/w symptomatic management of cough/supportive care  - ddimer <150

## 2022-05-01 NOTE — PROGRESS NOTE ADULT - PROBLEM SELECTOR PLAN 4
Need for prophylactic measure   VTE ppx: lovenox        Dispo: [facility] pending completion of isolation period for COVID--- Per Kyrie SW need 10 day quarantine or 5 day isolation if has neg PCR x2 24 hrs apart prior to DC to [facility]. COVID last positive 4/29
DVT ppx: Lovenox 40 qd  PT c/s    will need to obtain patient's birth control from [facility] for continued dosing, please call [facility] pharmacy in AM to coordinate and place order in AM when available
Need for prophylactic measure   VTE ppx: lovenox        Dispo: [facility] pending completion of isolation period for COVID--- Per Kyrie SW need 10 day quarantine or 5 day isolation if has neg PCR x2 24 hrs apart prior to DC to [facility]. COVID last positive 4/29
Need for prophylactic measure   VTE ppx: lovenox        Dispo: [facility] pending completion of isolation period for COVID
DVT ppx: Lovenox 40 qd  PT c/s    will need to obtain patient's birth control from [facility] for continued dosing, please call [facility] pharmacy in AM to coordinate and place order in AM when available

## 2022-05-01 NOTE — PROGRESS NOTE ADULT - PROBLEM SELECTOR PLAN 3
# TOBI  - c/w statin
- Continue statin  - lipid profile   - Low fat/ low cholesterol diet
# TOBI  - c/w statin
- Continue statin  - lipid profile   - Low fat/ low cholesterol diet
# TOBI  - c/w statin

## 2022-05-02 ENCOUNTER — TRANSCRIPTION ENCOUNTER (OUTPATIENT)
Age: 22
End: 2022-05-02

## 2022-05-02 DIAGNOSIS — R45.851 SUICIDAL IDEATIONS: ICD-10-CM

## 2022-05-02 LAB
ANION GAP SERPL CALC-SCNC: 13 MMOL/L — SIGNIFICANT CHANGE UP (ref 7–14)
BUN SERPL-MCNC: 10 MG/DL — SIGNIFICANT CHANGE UP (ref 7–23)
CALCIUM SERPL-MCNC: 9.3 MG/DL — SIGNIFICANT CHANGE UP (ref 8.4–10.5)
CHLORIDE SERPL-SCNC: 100 MMOL/L — SIGNIFICANT CHANGE UP (ref 98–107)
CO2 SERPL-SCNC: 22 MMOL/L — SIGNIFICANT CHANGE UP (ref 22–31)
CREAT SERPL-MCNC: 0.6 MG/DL — SIGNIFICANT CHANGE UP (ref 0.5–1.3)
EGFR: 130 ML/MIN/1.73M2 — SIGNIFICANT CHANGE UP
GLUCOSE SERPL-MCNC: 81 MG/DL — SIGNIFICANT CHANGE UP (ref 70–99)
HCT VFR BLD CALC: 37.5 % — SIGNIFICANT CHANGE UP (ref 34.5–45)
HGB BLD-MCNC: 13.2 G/DL — SIGNIFICANT CHANGE UP (ref 11.5–15.5)
MAGNESIUM SERPL-MCNC: 2.1 MG/DL — SIGNIFICANT CHANGE UP (ref 1.6–2.6)
MCHC RBC-ENTMCNC: 30.1 PG — SIGNIFICANT CHANGE UP (ref 27–34)
MCHC RBC-ENTMCNC: 35.2 GM/DL — SIGNIFICANT CHANGE UP (ref 32–36)
MCV RBC AUTO: 85.4 FL — SIGNIFICANT CHANGE UP (ref 80–100)
NRBC # BLD: 0 /100 WBCS — SIGNIFICANT CHANGE UP
NRBC # FLD: 0 K/UL — SIGNIFICANT CHANGE UP
PHOSPHATE SERPL-MCNC: 4.2 MG/DL — SIGNIFICANT CHANGE UP (ref 2.5–4.5)
PLATELET # BLD AUTO: 194 K/UL — SIGNIFICANT CHANGE UP (ref 150–400)
POTASSIUM SERPL-MCNC: 3.6 MMOL/L — SIGNIFICANT CHANGE UP (ref 3.5–5.3)
POTASSIUM SERPL-SCNC: 3.6 MMOL/L — SIGNIFICANT CHANGE UP (ref 3.5–5.3)
RBC # BLD: 4.39 M/UL — SIGNIFICANT CHANGE UP (ref 3.8–5.2)
RBC # FLD: 12.2 % — SIGNIFICANT CHANGE UP (ref 10.3–14.5)
SARS-COV-2 RNA SPEC QL NAA+PROBE: DETECTED
SODIUM SERPL-SCNC: 135 MMOL/L — SIGNIFICANT CHANGE UP (ref 135–145)
WBC # BLD: 6.84 K/UL — SIGNIFICANT CHANGE UP (ref 3.8–10.5)
WBC # FLD AUTO: 6.84 K/UL — SIGNIFICANT CHANGE UP (ref 3.8–10.5)

## 2022-05-02 PROCEDURE — 99232 SBSQ HOSP IP/OBS MODERATE 35: CPT

## 2022-05-02 RX ADMIN — DULOXETINE HYDROCHLORIDE 90 MILLIGRAM(S): 30 CAPSULE, DELAYED RELEASE ORAL at 11:59

## 2022-05-02 RX ADMIN — MIRTAZAPINE 15 MILLIGRAM(S): 45 TABLET, ORALLY DISINTEGRATING ORAL at 21:54

## 2022-05-02 RX ADMIN — ENOXAPARIN SODIUM 40 MILLIGRAM(S): 100 INJECTION SUBCUTANEOUS at 17:32

## 2022-05-02 RX ADMIN — LORATADINE 10 MILLIGRAM(S): 10 TABLET ORAL at 12:00

## 2022-05-02 RX ADMIN — ATORVASTATIN CALCIUM 10 MILLIGRAM(S): 80 TABLET, FILM COATED ORAL at 21:54

## 2022-05-02 NOTE — DISCHARGE NOTE PROVIDER - HOSPITAL COURSE
21y F PMHx HLD, IBS, anxiety/depression sent from [facility] (voluntary admission d/t suicidal ideation) due to COVID+ status and need for isolation. Currently undergoing psychiatric reassessment.    + covid - on RA , no tx needed  + suicide attempt - now improved and off constant obs per psych    Hospital course:    Pt admitted with SI and COVID. Pt remains on room air during hospitalization, supportive care for COVID. Psy following for SI, pt had remained on constant observation now with mood improved off constant observation. Family/pt requested [facility] inpatient given concern for safety.     Case discussed with Dr Stephen *****      INCOMPERNA    21y F PMHx HLD, IBS, anxiety/depression sent from [facility] (voluntary admission d/t suicidal ideation) due to COVID+ status and need for isolation. Currently undergoing psychiatric reassessment.    + covid - on RA , no tx needed  + suicide attempt - now improved and off constant obs per psych    Hospital course:    Pt admitted with SI and COVID. Pt remains on room air during hospitalization, supportive care for COVID. Psy following for SI, pt had remained on constant observation now with mood improved off constant observation. Family/pt requested [facility] inpatient given concern for safety.     Case discussed with Dr Stephen on 5/3/2022. Patient is medically cleared for discharge to [facility].    21y F PMHx HLD, IBS, anxiety/depression sent from [facility] (was voluntary admission there d/t suicidal ideation) due to COVID+ status and need for isolation. Currently undergoing psychiatric reassessment.    + covid - on RA , no tx needed  + suicide attempt - now improved and off constant obs per psych    Hospital course:    Pt admitted with SI and COVID. Pt remains on room air during hospitalization, received supportive care for COVID. Psy following for SI, pt had remained on constant observation now with mood improved off constant observation. Family/pt requested [facility] inpatient given concern for safety. Patient completed isolation period in hospital and voluntarily readmitted to [facility] for further psychiatric care.     Case discussed with Dr Stephen on 5/3/2022. Patient is medically cleared for discharge to [facility].

## 2022-05-02 NOTE — PROGRESS NOTE ADULT - SUBJECTIVE AND OBJECTIVE BOX
[facility] Medicine  Pager [phone]    Patient is a 21y old  Female who presents with a chief complaint of COVID (02 May 2022 11:22)      INTERVAL HPI/OVERNIGHT EVENTS:    MEDICATIONS  (STANDING):  atorvastatin 10 milliGRAM(s) Oral at bedtime  DULoxetine 90 milliGRAM(s) Oral daily  enoxaparin Injectable 40 milliGRAM(s) SubCutaneous every 24 hours  loratadine 10 milliGRAM(s) Oral daily  mirtazapine 15 milliGRAM(s) Oral at bedtime  Vestura: Ethinyl estradiol 0.02 mg and drospirenone 3 mg 1 Tablet(s) 1 Tablet(s) Oral daily    MEDICATIONS  (PRN):  acetaminophen     Tablet .. 650 milliGRAM(s) Oral every 6 hours PRN Temp greater or equal to 38C (100.4F), Mild Pain (1 - 3)  benzonatate 200 milliGRAM(s) Oral every 8 hours PRN Cough  hydrOXYzine hydrochloride 25 milliGRAM(s) Oral every 6 hours PRN Anxiety  melatonin 6 milliGRAM(s) Oral at bedtime PRN Insomnia      Allergies    No Known Allergies    Intolerances        REVIEW OF SYSTEMS:  Please see interval HPI:    Vital Signs Last 24 Hrs  T(C): 36.9 (02 May 2022 12:00), Max: 37 (01 May 2022 17:45)  T(F): 98.4 (02 May 2022 12:00), Max: 98.6 (01 May 2022 17:45)  HR: 70 (02 May 2022 12:00) (70 - 117)  BP: 123/82 (02 May 2022 12:00) (104/72 - 123/82)  BP(mean): 85 (02 May 2022 05:15) (85 - 85)  RR: 17 (02 May 2022 12:00) (17 - 19)  SpO2: 98% (02 May 2022 12:00) (98% - 98%)  I&O's Detail        PHYSICAL EXAM:  GENERAL:   HEAD:    EYES:   ENMT:   NECK:   NERVOUS SYSTEM:    CHEST/LUNG:   HEART:   ABDOMEN:   EXTREMITIES:    LYMPH:   SKIN:     LABS:                        13.2   6.84  )-----------( 194      ( 02 May 2022 07:00 )             37.5     02 May 2022 07:00    135    |  100    |  10     ----------------------------<  81     3.6     |  22     |  0.60     Ca    9.3        02 May 2022 07:00  Phos  4.2       02 May 2022 07:00  Mg     2.10      02 May 2022 07:00        CAPILLARY BLOOD GLUCOSE        BLOOD CULTURE    RADIOLOGY & ADDITIONAL TESTS:    Imaging Personally Reviewed:  [ ] YES     Consultant(s) Notes Reviewed:      Care Discussed with Consultants/Other Providers: [facility] Medicine  Pager [phone]    Patient is a 21y old  Female who presents with a chief complaint of COVID (02 May 2022 11:22)      INTERVAL HPI/OVERNIGHT EVENTS:  No complaints, denies cough/SOB. Wants to know when can go to [facility]. Has adult coloring book and markers at bedside, no other concerns at this time.     MEDICATIONS  (STANDING):  atorvastatin 10 milliGRAM(s) Oral at bedtime  DULoxetine 90 milliGRAM(s) Oral daily  enoxaparin Injectable 40 milliGRAM(s) SubCutaneous every 24 hours  loratadine 10 milliGRAM(s) Oral daily  mirtazapine 15 milliGRAM(s) Oral at bedtime  Vestura: Ethinyl estradiol 0.02 mg and drospirenone 3 mg 1 Tablet(s) 1 Tablet(s) Oral daily    MEDICATIONS  (PRN):  acetaminophen     Tablet .. 650 milliGRAM(s) Oral every 6 hours PRN Temp greater or equal to 38C (100.4F), Mild Pain (1 - 3)  benzonatate 200 milliGRAM(s) Oral every 8 hours PRN Cough  hydrOXYzine hydrochloride 25 milliGRAM(s) Oral every 6 hours PRN Anxiety  melatonin 6 milliGRAM(s) Oral at bedtime PRN Insomnia      Allergies    No Known Allergies    Intolerances        REVIEW OF SYSTEMS:  Please see interval HPI:    Vital Signs Last 24 Hrs  T(C): 36.9 (02 May 2022 12:00), Max: 37 (01 May 2022 17:45)  T(F): 98.4 (02 May 2022 12:00), Max: 98.6 (01 May 2022 17:45)  HR: 70 (02 May 2022 12:00) (70 - 117)  BP: 123/82 (02 May 2022 12:00) (104/72 - 123/82)  BP(mean): 85 (02 May 2022 05:15) (85 - 85)  RR: 17 (02 May 2022 12:00) (17 - 19)  SpO2: 98% (02 May 2022 12:00) (98% - 98%)  I&O's Detail        PHYSICAL EXAM:  GENERAL: NAD, sitting in bed, has a visitor (gave OK to speak about care in front of visitor)  HEAD:  NC/AT  EYES: EOMI, clear sclera/conjunctiva  ENMT: MMM  NECK: supple  NERVOUS SYSTEM: non-focal, pleasant    CHEST/LUNG: Comfortable on RA, speaking in full sentences  EXTREMITIES:  no c/c/e      LABS:                        13.2   6.84  )-----------( 194      ( 02 May 2022 07:00 )             37.5     02 May 2022 07:00    135    |  100    |  10     ----------------------------<  81     3.6     |  22     |  0.60     Ca    9.3        02 May 2022 07:00  Phos  4.2       02 May 2022 07:00  Mg     2.10      02 May 2022 07:00    CAPILLARY BLOOD GLUCOSE    BLOOD CULTURE    RADIOLOGY & ADDITIONAL TESTS:    Imaging Personally Reviewed:  [ ] YES     Consultant(s) Notes Reviewed:      Care Discussed with Consultants/Other Providers:  Dr. Reyez re: pending [facility] placement, they will have patient sign legals once bed available TERENCE Couch/Rubén re: pending [facility] placement

## 2022-05-02 NOTE — PROGRESS NOTE ADULT - NSPROGADDITIONALINFOA_GEN_ALL_CORE
previously declined offer to update parents re: hospital course
Updated father Tod at bedside 4/26, in agreement w/ plan
Patient previously declined offer to update family

## 2022-05-02 NOTE — DISCHARGE NOTE PROVIDER - DETAILS OF MALNUTRITION DIAGNOSIS/DIAGNOSES
This patient has been assessed with a concern for Malnutrition and was treated during this hospitalization for the following Nutrition diagnosis/diagnoses:     -  04/29/2022: Severe protein-calorie malnutrition

## 2022-05-02 NOTE — DISCHARGE NOTE PROVIDER - NSDCMRMEDTOKEN_GEN_ALL_CORE_FT
clonazePAM 0.5 mg oral tablet: 1 dose(s) orally once a day (at bedtime)  loratadine 10 mg oral tablet: 1 tab(s) orally once a day  mirtazapine 15 mg oral tablet: 1 tab(s) orally once a day (at bedtime)  Vestura 3 mg-0.02 mg oral tablet: 1 tab(s) orally once a day   atorvastatin 10 mg oral tablet: 1 tab(s) orally once a day (at bedtime)  DULoxetine 30 mg oral delayed release capsule: 3 cap(s) orally once a day  hydrOXYzine hydrochloride 25 mg oral tablet: 1 tab(s) orally every 6 hours, As needed, Anxiety  loratadine 10 mg oral tablet: 1 tab(s) orally once a day  melatonin 3 mg oral tablet: 2 tab(s) orally once a day (at bedtime), As needed, Insomnia  mirtazapine 15 mg oral tablet: 1 tab(s) orally once a day (at bedtime)  Vestura 3 mg-0.02 mg oral tablet: 1 tab(s) orally once a day   atorvastatin 10 mg oral tablet: 1 tab(s) orally once a day (at bedtime)  Cymbalta 60 mg oral delayed release capsule: 2 cap(s) orally once a day  loratadine 10 mg oral tablet: 1 tab(s) orally once a day  prazosin 2 mg oral capsule: 2 cap(s) orally once a day (at bedtime)  Vestura 3 mg-0.02 mg oral tablet: 1 tab(s) orally once a day

## 2022-05-02 NOTE — DISCHARGE NOTE PROVIDER - PROVIDER TOKENS
FREE:[LAST:[Kyrie],PHONE:[(   )    -],FAX:[(   )    -]],FREE:[LAST:[Kendra Pereira],PHONE:[phone]],FAX:[(   )    -],ADDRESS:[PMD]]

## 2022-05-02 NOTE — DISCHARGE NOTE PROVIDER - NSDCCPCAREPLAN_GEN_ALL_CORE_FT
PRINCIPAL DISCHARGE DIAGNOSIS  Diagnosis: COVID-19  Assessment and Plan of Treatment: You have been diagnosed with the COVID-19 virus during your hospital stay. You have completed 10 days of isoaltion. Monitor for fevers, shortness of breath and cough primarily.     Wear a facemask.   Cover your coughs and sneezes.   Clean your hands often.   Avoid sharing personal household items.   Clean all “high-touch” surfaces everyday.   Monitor your symptoms.  If you have a medical emergency and need to call 911, notify the dispatch personnel that you have COVID-19 If possible, put on a facemask before emergency medical services arrive.        SECONDARY DISCHARGE DIAGNOSES  Diagnosis: Suicidal ideation  Assessment and Plan of Treatment: you were seen by psychiatry, continue care at [facility]    Diagnosis: HLD (hyperlipidemia)  Assessment and Plan of Treatment: Low fat diet, exercise daily and continue current medications. Follow up with primary care physician and cardiologist for management.

## 2022-05-02 NOTE — DISCHARGE NOTE PROVIDER - CARE PROVIDER_API CALL
Kyrie,   Phone: (   )    -  Fax: (   )    -  Follow Up Time:     Kendra Pereira,   PMD  Phone: [phone]  Fax: (   )    -  Follow Up Time:

## 2022-05-02 NOTE — BH CONSULTATION LIAISON PROGRESS NOTE - NSBHASSESSMENTFT_PSY_ALL_CORE
Patient is a 21-year-old female, single, non-caregiver residing with her parents, currently taking a leave of absence from [employer], with PPhx anxiety, panic and depression with long hx of outpatient treatment (currently in treatment with ELIDA Mccann), no history of violence, no legal involvement, no self-injurious behaviors, no suicide attempts, 2 prior psychiatric hospitalization recently at [facility] 1S from 2/28-3/9/22 and 3/31-4/12 no access to guns/weapons, who presented to partial appointment on 4/18 after SA via alcohol and pills on 4/16. Patient brought over for voluntary admission. Sent to [facility] for COVID + test result. During this admission, Pt has remained calm, future oriented, in appropriate behavioral control. Reports feeling anxious at times with fleeting passive suicidal ideation. Denies active suicidal ideation, intent, or plan, urges for self-injurious behaviors, HIIP, and AVH.    Patient continues to want to go back to [facility] to start her inpatient psychiatric treatment. Awaiting final recs from infection control.    PLAN  - no psych indication for CO, Pt in appropriate behavioral control, fleeting passive SI (present this AM with no plan or intent, but states that she can call for help if needed), no SIB urges, no HIIP.  - continue Duloxetine 90 mg daily (depression) - Home medication  - discontinued Clonazepam (home medication)  - continue Mirtazapine 15 mg QHS (insomnia) - Home medication, started 4/20, increased to 15mg 4/22  - PRN for anxiety: Atarax 25mg q6hrs   - dispo pending as patient COVID + and requires isolation -- Pt and family prefers inpatient [facility] hospitalization for further stabilization given multiple psych admissions and recent suicide attempt

## 2022-05-03 ENCOUNTER — TRANSCRIPTION ENCOUNTER (OUTPATIENT)
Age: 22
End: 2022-05-03

## 2022-05-03 ENCOUNTER — INPATIENT (INPATIENT)
Facility: HOSPITAL | Age: 22
LOS: 12 days | Discharge: HOME | End: 2022-05-16
Attending: STUDENT IN AN ORGANIZED HEALTH CARE EDUCATION/TRAINING PROGRAM | Admitting: STUDENT IN AN ORGANIZED HEALTH CARE EDUCATION/TRAINING PROGRAM
Payer: COMMERCIAL

## 2022-05-03 VITALS — HEIGHT: 64 IN | TEMPERATURE: 98 F | RESPIRATION RATE: 18 BRPM | OXYGEN SATURATION: 100 % | WEIGHT: 121.25 LBS

## 2022-05-03 VITALS
DIASTOLIC BLOOD PRESSURE: 71 MMHG | SYSTOLIC BLOOD PRESSURE: 112 MMHG | RESPIRATION RATE: 18 BRPM | HEART RATE: 66 BPM | TEMPERATURE: 98 F | OXYGEN SATURATION: 97 %

## 2022-05-03 DIAGNOSIS — S82.892A OTHER FRACTURE OF LEFT LOWER LEG, INITIAL ENCOUNTER FOR CLOSED FRACTURE: Chronic | ICD-10-CM

## 2022-05-03 DIAGNOSIS — F33.2 MAJOR DEPRESSIVE DISORDER, RECURRENT SEVERE WITHOUT PSYCHOTIC FEATURES: ICD-10-CM

## 2022-05-03 PROCEDURE — 93010 ELECTROCARDIOGRAM REPORT: CPT

## 2022-05-03 PROCEDURE — 90853 GROUP PSYCHOTHERAPY: CPT

## 2022-05-03 PROCEDURE — 99239 HOSP IP/OBS DSCHRG MGMT >30: CPT

## 2022-05-03 PROCEDURE — 99231 SBSQ HOSP IP/OBS SF/LOW 25: CPT

## 2022-05-03 RX ORDER — LORATADINE 10 MG/1
1 TABLET ORAL
Qty: 0 | Refills: 0 | DISCHARGE

## 2022-05-03 RX ORDER — ATORVASTATIN CALCIUM 80 MG/1
1 TABLET, FILM COATED ORAL
Qty: 0 | Refills: 0 | DISCHARGE
Start: 2022-05-03

## 2022-05-03 RX ORDER — HYDROXYZINE HCL 10 MG
25 TABLET ORAL EVERY 6 HOURS
Refills: 0 | Status: DISCONTINUED | OUTPATIENT
Start: 2022-05-03 | End: 2022-05-16

## 2022-05-03 RX ORDER — DULOXETINE HYDROCHLORIDE 30 MG/1
90 CAPSULE, DELAYED RELEASE ORAL DAILY
Refills: 0 | Status: DISCONTINUED | OUTPATIENT
Start: 2022-05-03 | End: 2022-05-06

## 2022-05-03 RX ORDER — DULOXETINE HYDROCHLORIDE 30 MG/1
3 CAPSULE, DELAYED RELEASE ORAL
Qty: 0 | Refills: 0 | DISCHARGE
Start: 2022-05-03

## 2022-05-03 RX ORDER — LORATADINE 10 MG/1
10 TABLET ORAL DAILY
Refills: 0 | Status: DISCONTINUED | OUTPATIENT
Start: 2022-05-03 | End: 2022-05-16

## 2022-05-03 RX ORDER — ATORVASTATIN CALCIUM 80 MG/1
10 TABLET, FILM COATED ORAL AT BEDTIME
Refills: 0 | Status: DISCONTINUED | OUTPATIENT
Start: 2022-05-03 | End: 2022-05-16

## 2022-05-03 RX ORDER — HYDROXYZINE HCL 10 MG
1 TABLET ORAL
Qty: 0 | Refills: 0 | DISCHARGE
Start: 2022-05-03

## 2022-05-03 RX ORDER — MIRTAZAPINE 45 MG/1
15 TABLET, ORALLY DISINTEGRATING ORAL AT BEDTIME
Refills: 0 | Status: DISCONTINUED | OUTPATIENT
Start: 2022-05-03 | End: 2022-05-04

## 2022-05-03 RX ORDER — LORATADINE 10 MG/1
1 TABLET ORAL
Qty: 0 | Refills: 0 | DISCHARGE
Start: 2022-05-03

## 2022-05-03 RX ORDER — CLONAZEPAM 1 MG
1 TABLET ORAL
Qty: 0 | Refills: 0 | DISCHARGE

## 2022-05-03 RX ORDER — LANOLIN ALCOHOL/MO/W.PET/CERES
2 CREAM (GRAM) TOPICAL
Qty: 0 | Refills: 0 | DISCHARGE
Start: 2022-05-03

## 2022-05-03 RX ADMIN — DULOXETINE HYDROCHLORIDE 90 MILLIGRAM(S): 30 CAPSULE, DELAYED RELEASE ORAL at 12:06

## 2022-05-03 RX ADMIN — LORATADINE 10 MILLIGRAM(S): 10 TABLET ORAL at 12:06

## 2022-05-03 RX ADMIN — ATORVASTATIN CALCIUM 10 MILLIGRAM(S): 80 TABLET, FILM COATED ORAL at 22:05

## 2022-05-03 RX ADMIN — MIRTAZAPINE 15 MILLIGRAM(S): 45 TABLET, ORALLY DISINTEGRATING ORAL at 22:04

## 2022-05-03 NOTE — BH PATIENT PROFILE - HOME MEDICATIONS
atorvastatin 10 mg oral tablet , 1 tab(s) orally once a day (at bedtime)  hydrOXYzine hydrochloride 25 mg oral tablet , 1 tab(s) orally every 6 hours, As needed, Anxiety  melatonin 3 mg oral tablet , 2 tab(s) orally once a day (at bedtime), As needed, Insomnia  loratadine 10 mg oral tablet , 1 tab(s) orally once a day  DULoxetine 30 mg oral delayed release capsule , 3 cap(s) orally once a day  mirtazapine 15 mg oral tablet , 1 tab(s) orally once a day (at bedtime)  Vestura 3 mg-0.02 mg oral tablet , 1 tab(s) orally once a day

## 2022-05-03 NOTE — BH PATIENT PROFILE - NSDASAANGER_PSY_ALL_CORE
Detail Level: Zone Note Text (......Xxx Chief Complaint.): This diagnosis correlates with the Other (Free Text): Offered patient full exam at next visit to discuss treatment options. No

## 2022-05-03 NOTE — PROGRESS NOTE ADULT - SUBJECTIVE AND OBJECTIVE BOX
[facility] Medicine  Pager [phone]    Patient is a 21y old  Female who presents with a chief complaint of COVID (02 May 2022 12:23)      INTERVAL HPI/OVERNIGHT EVENTS:    MEDICATIONS  (STANDING):  atorvastatin 10 milliGRAM(s) Oral at bedtime  DULoxetine 90 milliGRAM(s) Oral daily  enoxaparin Injectable 40 milliGRAM(s) SubCutaneous every 24 hours  loratadine 10 milliGRAM(s) Oral daily  mirtazapine 15 milliGRAM(s) Oral at bedtime  Vestura: Ethinyl estradiol 0.02 mg and drospirenone 3 mg 1 Tablet(s) 1 Tablet(s) Oral daily    MEDICATIONS  (PRN):  acetaminophen     Tablet .. 650 milliGRAM(s) Oral every 6 hours PRN Temp greater or equal to 38C (100.4F), Mild Pain (1 - 3)  benzonatate 200 milliGRAM(s) Oral every 8 hours PRN Cough  hydrOXYzine hydrochloride 25 milliGRAM(s) Oral every 6 hours PRN Anxiety  melatonin 6 milliGRAM(s) Oral at bedtime PRN Insomnia      Allergies    No Known Allergies    Intolerances        REVIEW OF SYSTEMS:  Please see interval HPI:    Vital Signs Last 24 Hrs  T(C): 36.7 (03 May 2022 11:30), Max: 36.7 (02 May 2022 22:30)  T(F): 98.1 (03 May 2022 11:30), Max: 98.1 (02 May 2022 22:30)  HR: 66 (03 May 2022 11:30) (66 - 90)  BP: 112/71 (03 May 2022 11:30) (104/62 - 112/71)  BP(mean): --  RR: 18 (03 May 2022 11:30) (18 - 18)  SpO2: 97% (03 May 2022 11:30) (97% - 98%)  I&O's Detail        PHYSICAL EXAM:  GENERAL:   HEAD:    EYES:   ENMT:   NECK:   NERVOUS SYSTEM:    CHEST/LUNG:   HEART:   ABDOMEN:   EXTREMITIES:    LYMPH:   SKIN:     LABS:      Ca    9.3        02 May 2022 07:00        CAPILLARY BLOOD GLUCOSE        BLOOD CULTURE    RADIOLOGY & ADDITIONAL TESTS:    Imaging Personally Reviewed:  [ ] YES     Consultant(s) Notes Reviewed:      Care Discussed with Consultants/Other Providers: [facility] Medicine  Pager [phone]    Patient is a 21y old  Female who presents with a chief complaint of COVID (02 May 2022 12:23)      INTERVAL HPI/OVERNIGHT EVENTS:  No acute complaints, looking forward to going to [facility] for further psychiatric care, no other concerns at this time. Father at bedside, both in agreement w/ d/c plan.     MEDICATIONS  (STANDING):  atorvastatin 10 milliGRAM(s) Oral at bedtime  DULoxetine 90 milliGRAM(s) Oral daily  enoxaparin Injectable 40 milliGRAM(s) SubCutaneous every 24 hours  loratadine 10 milliGRAM(s) Oral daily  mirtazapine 15 milliGRAM(s) Oral at bedtime  Vestura: Ethinyl estradiol 0.02 mg and drospirenone 3 mg 1 Tablet(s) 1 Tablet(s) Oral daily    MEDICATIONS  (PRN):  acetaminophen     Tablet .. 650 milliGRAM(s) Oral every 6 hours PRN Temp greater or equal to 38C (100.4F), Mild Pain (1 - 3)  benzonatate 200 milliGRAM(s) Oral every 8 hours PRN Cough  hydrOXYzine hydrochloride 25 milliGRAM(s) Oral every 6 hours PRN Anxiety  melatonin 6 milliGRAM(s) Oral at bedtime PRN Insomnia    Allergies  No Known Allergies    Intolerances    REVIEW OF SYSTEMS:  Please see interval HPI:    Vital Signs Last 24 Hrs  T(C): 36.7 (03 May 2022 11:30), Max: 36.7 (02 May 2022 22:30)  T(F): 98.1 (03 May 2022 11:30), Max: 98.1 (02 May 2022 22:30)  HR: 66 (03 May 2022 11:30) (66 - 90)  BP: 112/71 (03 May 2022 11:30) (104/62 - 112/71)  BP(mean): --  RR: 18 (03 May 2022 11:30) (18 - 18)  SpO2: 97% (03 May 2022 11:30) (97% - 98%)  I&O's Detail      PHYSICAL EXAM:  GENERAL: NAD, sitting in bed, father at bedside  HEAD:  NC/AT  EYES: EOMI, clear sclera/conjunctiva  ENMT: MMM  NECK: supple  NERVOUS SYSTEM: non-focal, moving all extremities   CHEST/LUNG: Comfortable on RA, speaking in full sentences  EXTREMITIES:  no c/c/e    LABS:                        13.2   6.84  )-----------( 194      ( 02 May 2022 07:00 )             37.5     05-02    135  |  100  |  10  ----------------------------<  81  3.6   |  22  |  0.60    Ca    9.3      02 May 2022 07:00  Phos  4.2     05-02  Mg     2.10     05-02      CAPILLARY BLOOD GLUCOSE    BLOOD CULTURE    RADIOLOGY & ADDITIONAL TESTS:    Imaging Personally Reviewed:  [ ] YES     Consultant(s) Notes Reviewed:      Care Discussed with Consultants/Other Providers: TERENCE Couch/Rubén re: d/c plan to [facility], patient is medically stable for discharge to inpatient [facility] facility

## 2022-05-03 NOTE — BH INPATIENT PSYCHIATRY ASSESSMENT NOTE - NSBHASSESSSUMMFT_PSY_ALL_CORE
21 year-old with depression, s/p suicide attempt, admitted to [facility], contracted COVID, transferred to medicine, cleared, transferred back on 9.13  Continue duloxetine and mirtazapine  Routine checks

## 2022-05-03 NOTE — BH INPATIENT PSYCHIATRY ASSESSMENT NOTE - OTHER PAST PSYCHIATRIC HISTORY (INCLUDE DETAILS REGARDING ONSET, COURSE OF ILLNESS, INPATIENT/OUTPATIENT TREATMENT)
1 prior hospitalization at  at [facility] 2/28-3/9, outpatient care under Dr. Morataya for past 3 years, therapist Aida Martines

## 2022-05-03 NOTE — BH CONSULTATION LIAISON PROGRESS NOTE - NSBHFUPINTERVALCCFT_PSY_A_CORE
March 19, 2021     Patient: Milan Land  YOB: 1981   Date of Visit: 3/19/2021       To Whom it May Concern:    Mary Ann Trotter is under my professional care  He was seen in my office on 3/19/2021  If you have any questions or concerns, please don't hesitate to call           Sincerely,          [employer], DO        CC: No Recipients
March 19, 2021     Patient: Rodger Jones  YOB: 1981   Date of Visit: 3/19/2021       To Whom it May Concern:    Don Arthur is under my professional care  She was seen in my office on 3/19/2021  If you have any questions or concerns, please don't hesitate to call           Sincerely,          [employer], DO        CC: No Recipients
"I feel very anxious"
"I prefer in person programs" 
Anxious
Anxious
"I feel calm"

## 2022-05-03 NOTE — BH INPATIENT PSYCHIATRY ASSESSMENT NOTE - NSBHMETABOLIC_PSY_ALL_CORE_FT
BMI: BMI (kg/m2): 27.4 (05-03-22 @ 17:30)  HbA1c: A1C with Estimated Average Glucose Result: 4.6 % (04-19-22 @ 11:13)    Glucose: POCT Blood Glucose.: 122 mg/dL (04-17-22 @ 16:05)    BP: --  Lipid Panel: Date/Time: 04-23-22 @ 07:02  Cholesterol, Serum: 140  Direct LDL: --  HDL Cholesterol, Serum: 63  Total Cholesterol/HDL Ration Measurement: --  Triglycerides, Serum: 147

## 2022-05-03 NOTE — BH CONSULTATION LIAISON PROGRESS NOTE - NSBHASSESSMENTFT_PSY_ALL_CORE
Patient is a 21-year-old female, single, non-caregiver residing with her parents, currently taking a leave of absence from [employer], with PPhx anxiety, panic and depression with long hx of outpatient treatment (currently in treatment with ELIDA Mccann), no history of violence, no legal involvement, no self-injurious behaviors, no suicide attempts, 2 prior psychiatric hospitalization recently at [facility] 1S from 2/28-3/9/22 and 3/31-4/12 no access to guns/weapons, who presented to partial appointment on 4/18 after SA via alcohol and pills on 4/16. Patient brought over for voluntary admission. Sent to [facility] for COVID + test result. During this admission, Pt has remained calm, future oriented, in appropriate behavioral control. Reports feeling anxious at times with fleeting passive suicidal ideation. Denies active suicidal ideation, intent, or plan, urges for self-injurious behaviors, HIIP, and AVH.    Patient continues to want to go back to [facility] to start her inpatient psychiatric treatment, legals signed, pending transfer.     PLAN  - no psych indication for CO, Pt in appropriate behavioral control, denies current active SIIP or self-harming.   - continue Duloxetine 90 mg daily (depression) - Home medication  - d/zechariah Clonazepam (home medication)  - continue Mirtazapine 15 mg QHS (insomnia) - Home medication, started 4/20, increased to 15mg 4/22  - PRN for anxiety: Atarax 25mg q6hrs   - dispo pending transfer to [facility] on voluntary status. legals signed and in chart.  Patient is a 21-year-old female, single, non-caregiver residing with her parents, currently taking a leave of absence from [employer], with PPhx anxiety, panic and depression with long hx of outpatient treatment (currently in treatment with NP Jose Mccann), no history of violence, no legal involvement, no self-injurious behaviors, no suicide attempts, 2 prior psychiatric hospitalization recently at [facility] 1S from 2/28-3/9/22 and 3/31-4/12 no access to guns/weapons. Pt had presented to [facility] ED BIB father on recommendation from therapist in partial hospitalization program due to progressive depressive and anxious symptoms with SI and reported SA via alcohol and pills on 4/16 and was re-admitted 1 week later to [facility] on 4/18 for med management and stabilization. Shortly after, pt tested + for COVID and was transferred to [facility] for medical admission on 4/22 and followed by [facility] for suicidality and depression.     During this admission, pt has continued to endorse depressed mood and continues to report chronic, intermittent suicidal ideation. Pt has been in good behavioral control. Pt also reports feeling anxious. Pt reports some improvement in mood which she attributes to feeling hopeful toward her inpatient treatment. Currently pt denies active SIIP or thoughts of self-harm. Patient continues to report she wants to go back to [facility] to start her inpatient psychiatric treatment and feels she needs a higher level of care. Given her 3 recent admissions in short time frame, pt would likely benefit from inpatient setting for medication optimization and acquisition of skills.     Legals signed, and is pending transfer.     PLAN  - DISPO: Pending transfer to [facility] 1S on voluntary status. Legals signed and in chart. Verbal Handoff provided to Dr. Paz.   - Safety: Routine obs; Pt in appropriate behavioral control, denies current active SIIP or NSSIB.   - Psych, standing:   --Continue Duloxetine 90 mg daily for depression (home medication). Defer changes to inpatient primary team.   --Continue Mirtazapine 15 mg QHS (insomnia) (home medication)  	Started 4/20, increased to 15mg 4/22  --D/zechariah Clonazepam (home medication)  	Stopped 5/02  - PRN for anxiety: Atarax 25mg q6hrs   - PRN for agitation: Ativan 1mg po/IM q6hr

## 2022-05-03 NOTE — BH INPATIENT PSYCHIATRY ASSESSMENT NOTE - CURRENT MEDICATION
MEDICATIONS  (STANDING):  atorvastatin 10 milliGRAM(s) Oral at bedtime  DULoxetine 90 milliGRAM(s) Oral daily  loratadine 10 milliGRAM(s) Oral daily  mirtazapine 15 milliGRAM(s) Oral at bedtime    MEDICATIONS  (PRN):  hydrOXYzine hydrochloride 25 milliGRAM(s) Oral every 6 hours PRN Anxiety  LORazepam     Tablet 1 milliGRAM(s) Oral every 6 hours PRN Agitation  LORazepam   Injectable 1 milliGRAM(s) IntraMuscular once PRN Severe Agitation   MEDICATIONS  (STANDING):  atorvastatin 10 milliGRAM(s) Oral at bedtime  DULoxetine 90 milliGRAM(s) Oral daily  loratadine 10 milliGRAM(s) Oral daily  mirtazapine 15 milliGRAM(s) Oral at bedtime  Vestura 3-0.02mg 1 Tablet(s) 1 Tablet(s) Oral at bedtime    MEDICATIONS  (PRN):  hydrOXYzine hydrochloride 25 milliGRAM(s) Oral every 6 hours PRN Anxiety  LORazepam     Tablet 1 milliGRAM(s) Oral every 6 hours PRN Agitation  LORazepam   Injectable 1 milliGRAM(s) IntraMuscular once PRN Severe Agitation

## 2022-05-03 NOTE — BH CONSULTATION LIAISON PROGRESS NOTE - CURRENT MEDICATION
MEDICATIONS  (STANDING):  atorvastatin 10 milliGRAM(s) Oral at bedtime  DULoxetine 90 milliGRAM(s) Oral daily  enoxaparin Injectable 40 milliGRAM(s) SubCutaneous every 24 hours  loratadine 10 milliGRAM(s) Oral daily  mirtazapine 15 milliGRAM(s) Oral at bedtime  Vestura: Ethinyl estradiol 0.02 mg and drospirenone 3 mg 1 Tablet(s) 1 Tablet(s) Oral daily    MEDICATIONS  (PRN):  acetaminophen     Tablet .. 650 milliGRAM(s) Oral every 6 hours PRN Temp greater or equal to 38C (100.4F), Mild Pain (1 - 3)  benzonatate 200 milliGRAM(s) Oral every 8 hours PRN Cough  hydrOXYzine hydrochloride 25 milliGRAM(s) Oral every 6 hours PRN Anxiety  melatonin 6 milliGRAM(s) Oral at bedtime PRN Insomnia  
MEDICATIONS  (STANDING):  atorvastatin 10 milliGRAM(s) Oral at bedtime  DULoxetine 90 milliGRAM(s) Oral daily  enoxaparin Injectable 40 milliGRAM(s) SubCutaneous every 24 hours  loratadine 10 milliGRAM(s) Oral daily  mirtazapine 15 milliGRAM(s) Oral at bedtime  Vestura: Ethinyl estradiol 0.02 mg and drospirenone 3 mg 1 Tablet(s) 1 Tablet(s) Oral daily    MEDICATIONS  (PRN):  acetaminophen     Tablet .. 650 milliGRAM(s) Oral every 6 hours PRN Temp greater or equal to 38C (100.4F), Mild Pain (1 - 3)  benzonatate 200 milliGRAM(s) Oral every 8 hours PRN Cough  hydrOXYzine hydrochloride 25 milliGRAM(s) Oral every 6 hours PRN Anxiety  melatonin 6 milliGRAM(s) Oral at bedtime PRN Insomnia  
MEDICATIONS  (STANDING):  atorvastatin 10 milliGRAM(s) Oral at bedtime  BACItracin   Ointment 1 Application(s) Topical two times a day  DULoxetine 90 milliGRAM(s) Oral daily  enoxaparin Injectable 40 milliGRAM(s) SubCutaneous every 24 hours  loratadine 10 milliGRAM(s) Oral daily  mirtazapine 15 milliGRAM(s) Oral at bedtime  Vestura: Ethinyl estradiol 0.02 mg and drospirenone 3 mg 1 Tablet(s) 1 Tablet(s) Oral daily    MEDICATIONS  (PRN):  acetaminophen     Tablet .. 650 milliGRAM(s) Oral every 6 hours PRN Temp greater or equal to 38C (100.4F), Mild Pain (1 - 3)  benzonatate 200 milliGRAM(s) Oral every 8 hours PRN Cough  hydrOXYzine hydrochloride 25 milliGRAM(s) Oral every 6 hours PRN Anxiety  melatonin 6 milliGRAM(s) Oral at bedtime PRN Insomnia  
MEDICATIONS  (STANDING):  atorvastatin 10 milliGRAM(s) Oral at bedtime  BACItracin   Ointment 1 Application(s) Topical two times a day  DULoxetine 90 milliGRAM(s) Oral daily  enoxaparin Injectable 40 milliGRAM(s) SubCutaneous every 24 hours  loratadine 10 milliGRAM(s) Oral daily  mirtazapine 15 milliGRAM(s) Oral at bedtime  Vestura: Ethinyl estradiol 0.02 mg and drospirenone 3 mg 1 Tablet(s) 1 Tablet(s) Oral daily    MEDICATIONS  (PRN):  acetaminophen     Tablet .. 650 milliGRAM(s) Oral every 6 hours PRN Temp greater or equal to 38C (100.4F), Mild Pain (1 - 3)  benzonatate 200 milliGRAM(s) Oral every 8 hours PRN Cough  hydrOXYzine hydrochloride 25 milliGRAM(s) Oral every 6 hours PRN Anxiety  melatonin 6 milliGRAM(s) Oral at bedtime PRN Insomnia  
MEDICATIONS  (STANDING):  atorvastatin 10 milliGRAM(s) Oral at bedtime  DULoxetine 90 milliGRAM(s) Oral daily  enoxaparin Injectable 40 milliGRAM(s) SubCutaneous every 24 hours  loratadine 10 milliGRAM(s) Oral daily  mirtazapine 15 milliGRAM(s) Oral at bedtime  Vestura: Ethinyl estradiol 0.02 mg and drospirenone 3 mg 1 Tablet(s) 1 Tablet(s) Oral daily    MEDICATIONS  (PRN):  acetaminophen     Tablet .. 650 milliGRAM(s) Oral every 6 hours PRN Temp greater or equal to 38C (100.4F), Mild Pain (1 - 3)  benzonatate 200 milliGRAM(s) Oral every 8 hours PRN Cough  hydrOXYzine hydrochloride 25 milliGRAM(s) Oral every 6 hours PRN Anxiety  melatonin 6 milliGRAM(s) Oral at bedtime PRN Insomnia

## 2022-05-03 NOTE — BH CONSULTATION LIAISON PROGRESS NOTE - NSICDXBHSECONDARYDX_PSY_ALL_CORE
Borderline personality disorder   F60.3  

## 2022-05-03 NOTE — BH INPATIENT PSYCHIATRY ASSESSMENT NOTE - HPI (INCLUDE ILLNESS QUALITY, SEVERITY, DURATION, TIMING, CONTEXT, MODIFYING FACTORS, ASSOCIATED SIGNS AND SYMPTOMS)
21-year-old female, single, non-caregiver residing with her parents, currently taking a leave of absence from [employer], with PPhx anxiety, panic and depression with long hx of outpatient treatment (currently in treatment with NP Jose Mccann), no history of violence, no legal involvement, no self-injurious behaviors, no suicide attempts, 2 prior psychiatric hospitalization recently at [facility] 1S from 2/28-3/9/22 and 3/31-4/12 no access to guns/weapons. Pt had presented to [facility] ED BIB father on recommendation from therapist in partial hospitalization program due to progressive depressive and anxious symptoms with SI and reported SA via alcohol and pills on 4/16 and was re-admitted 1 week later to [facility] on 4/18 for med management and stabilization. Shortly after, pt tested + for COVID and was transferred to [facility] for medical admission on 4/22 and followed by Psych [facility] for suicidality and depression, continued on meds, medically cleared, transferred to   [facility] on 9.13

## 2022-05-03 NOTE — BH CONSULTATION LIAISON PROGRESS NOTE - NSBHFUPINTERVALHXFT_PSY_A_CORE
Chart reviewed. Patient continues to endorse wanting to return to [facility] for the treatment that she sought out originally. She denies SI currently though states that she had passive SI this AM with no intent or plan. No AH/VH elicited. Understands that she is awaiting a bed and for infection control to clear her.
Chart reviewed. Pt refused standing Klonopin overnight. No PRNs for agitated received. On interview, Pt is calm, somewhat bright and future oriented as she shares that she recently received a diagnosis of borderline personality disorder while at [facility], feels motivated to engage in treatment. She currently describes mood as "okay", denies distress from COVID infection, reports mild congestion. Denies feeling depressed or anxious. Admits to passive SI yesterday, denies any SI or HI today. No AVH or paranoia. No urges for self-harm. Pt reports medication side effects of sedation and "feeling groggy" until 9-10am. Reports sleeping well without any interruptions. Discussed possibility of discharging from [facility] with outpatient psychiatric follow-up care if Pt remains psychiatrically stable, Pt understands she may not transfer back to [facility] as this requires a waiting period of 11 days from positive COVID result.
Chart reviewed. No PRNs for agitation received. VSS. On interview, Pt is calm, smiles appropriately, although she states she feels very anxious given uncertainty with treatment planning. Denies feeling depressed. Denies any passive SI (reports last SI was earlier in week), denies any active suicidal ideation, intent, or plan.  No urges for self-harm. No HIIP. Pt is able to engage in safety planning, looks forward to having family visit her. Reports fair sleep and appetite. Pt states she prefers to transfer back to [facility] inpatient psychiatry after COVID quarantine period, early next week.   Spoke with father by phone (Tod Medina [phone]), provided updates, and confirmed that family also prefers Pt to return to [facility] inpatient unit on 1S to gain DBT skills and further psychiatric stabilization. Father states they are not pursuing entry to [facility] in-person partial hospitalization program at this time. 
Chart reviewed. No prns required. Patient continues to endorse wanting to return to [facility] for the treatment that she sought out originally. Reports feeling anxious, endorses continued depressed mood. She denies SI currently though endorses intermittent SI and would like inpatient admission for safety and stabilization. No AH/VH elicited. Bed found at [facility], pt awaiting transfer. Pt signed 9.13 legals and placed in chart. Handoff provided via voicemail to Dr. Paz.
Chart reviewed. No PRNs for agitation received. VSS. On interview, Pt is calm, admits to feeling anxious with treatment planning. Admits to fleeting passive SI, denies any active suicidal ideation, intent, or plan.  No urges for self-harm. No HIIP. Reports fair sleep. Pt shares that she spoke with [facility] team and understands that she would benefit from transitioning to outpatient psychiatric follow-up care rather than transfer back to [facility]. Discussed her preferences for aftercare.

## 2022-05-03 NOTE — DISCHARGE NOTE NURSING/CASE MANAGEMENT/SOCIAL WORK - NSDCPEFALRISK_GEN_ALL_CORE
For information on Fall & Injury Prevention, visit: [email or web address] OR  [email or web address] OR  https://www.cdc.gov/steadi/patient.html

## 2022-05-03 NOTE — BH CONSULTATION LIAISON PROGRESS NOTE - MSE UNSTRUCTURED FT
Mental Status Exam:  Aisha: well groomed, fair hygiene     Behavior: calm, cooperative, no psychomotor retardation/agitation  Motor: no tremors, EPS, or rigidity  Gait: normal, balanced  Speech: normal rate, rhythm, prosody and volume   Mood: "okay"  Affect: euthymic, congruent  Thought process: linear, goal directed, logical  Thought Content: denies paranoia, delusions   Perception: denies AH/VH  SI: denies currently, passive SI over weekend  HI: denies  Insight: fair  Judgment: fair  Impulse control: appropriate    Cognitive Exam:  Orientation: AOx3  Recall: intact  Attention: intact  Abstraction: intact
Mental Status Exam:  Aisha: well groomed, fair hygiene     Behavior: calm, cooperative, no psychomotor retardation/agitation  Motor: no tremors, EPS, or rigidity  Gait: not assessed, pt in bed  Speech: normal rate, rhythm, prosody and volume   Mood: anxious  Affect: euthymic  Thought process: linear, goal directed  Thought Content: denies paranoia, delusions, wants to go back to [facility]  Perception: denies AH/VH  SI: denies currently, passive this AM (no intent or plan, fleeting)   HI: denies  Insight: fair  Judgment: fair  Impulse control: appropriate    Cognitive Exam:  Orientation: AOx3  Recall: intact  Attention: intact  Abstraction: intact
Mental Status Exam:  Aisha: well groomed, fair hygiene     Behavior: calm, cooperative, no psychomotor retardation/agitation  Motor: no tremors, EPS, or rigidity  Gait: not assessed, pt in bed  Speech: normal rate, rhythm, prosody and volume   Mood: anxious  Affect: euthymic  Thought process: linear, goal directed  Thought Content: denies paranoia, delusions, wants to go back to [facility]  Perception: denies AH/VH  SI: denies currently, passive this AM (no intent or plan, fleeting)   HI: denies  Insight: fair  Judgment: fair  Impulse control: appropriate    Cognitive Exam:  Orientation: AOx3  Recall: intact  Attention: intact  Abstraction: intact
Mental Status Exam:  Aisha: well groomed, fair hygiene     Behavior: calm, cooperative, no psychomotor retardation/agitation  Motor: no tremors, EPS, or rigidity  Gait: not assessed, pt in bed  Speech: normal rate, rhythm, prosody and volume   Mood: "very anxious"  Affect: euthymic, incongruent to stated mood  Thought process: linear, goal directed  Thought Content: denies paranoia, delusions   Perception: denies AH/VH  SI: denies. No urges for SIB.   HI: denies  Insight: fair  Judgment: fair  Impulse control: appropriate    Cognitive Exam:  Orientation: AOx3  Recall: intact  Attention: intact  Abstraction: intact
Mental Status Exam:  Aisha: well groomed, fair hygiene     Behavior: calm, cooperative, no psychomotor retardation/agitation  Motor: no tremors, EPS, or rigidity  Gait: not assessed, pt in bed  Speech: normal rate, rhythm, prosody and volume   Mood: "anxious"  Affect: euthymic, congruent  Thought process: linear, goal directed, logical  Thought Content: denies paranoia, delusions   Perception: denies AH/VH  SI: +fleeting passive SI. No urges for SIB.   HI: denies  Insight: fair  Judgment: fair  Impulse control: appropriate    Cognitive Exam:  Orientation: AOx3  Recall: intact  Attention: intact  Abstraction: intact

## 2022-05-03 NOTE — BH CONSULTATION LIAISON PROGRESS NOTE - NSBHATTESTSEENBY_PSY_A_CORE
attending Psychiatrist without NP/Trainee
Attending Psychiatrist supervising NP/Trainee, meeting pt...
Attending Psychiatrist supervising NP/Trainee, meeting pt...
Trainee with telephonic supervision from Attending Psychiatrist
Attending Psychiatrist supervising NP/Trainee, meeting pt...

## 2022-05-03 NOTE — BH CONSULTATION LIAISON PROGRESS NOTE - NSCDBILL_PSY_A_CORE
09242 - Inpatient, high complexity
[id number] - Inpatient, high complexity
[id number] - Inpatient, high complexity
[id number] - Inpatient, high complexity

## 2022-05-03 NOTE — BH PATIENT PROFILE - FALL HARM RISK - UNIVERSAL INTERVENTIONS
Bed in lowest position, wheels locked, appropriate side rails in place/Call bell, personal items and telephone in reach/Instruct patient to call for assistance before getting out of bed or chair/Non-slip footwear when patient is out of bed/Tiro to call system/Physically safe environment - no spills, clutter or unnecessary equipment/Purposeful Proactive Rounding/Room/bathroom lighting operational, light cord in reach

## 2022-05-03 NOTE — BH INPATIENT PSYCHIATRY ASSESSMENT NOTE - NSBHCHARTREVIEWVS_PSY_A_CORE FT
Vital Signs Last 24 Hrs  T(C): 36.5 (05-03-22 @ 17:30), Max: 36.7 (05-02-22 @ 22:30)  T(F): 97.7 (05-03-22 @ 17:30), Max: 98.1 (05-02-22 @ 22:30)  HR: 66 (05-03-22 @ 11:30) (66 - 90)  BP: 112/71 (05-03-22 @ 11:30) (104/62 - 112/71)  BP(mean): --  RR: 18 (05-03-22 @ 17:30) (18 - 18)  SpO2: 100% (05-03-22 @ 17:30) (97% - 100%)    Orthostatic VS  05-03-22 @ 17:30  Lying BP: --/-- HR: --  Sitting BP: 109/74 HR: 108  Standing BP: 122/64 HR: 110  Site: --  Mode: --

## 2022-05-03 NOTE — BH CONSULTATION LIAISON PROGRESS NOTE - NSBHADMITIPOBS_PSY_A_CORE
Routine observation
Routine observation
Constant observation
Routine observation
Routine observation

## 2022-05-03 NOTE — BH CONSULTATION LIAISON PROGRESS NOTE - NSBHCHARTREVIEWVS_PSY_A_CORE FT
Vital Signs Last 24 Hrs  T(C): 36.9 (02 May 2022 12:00), Max: 37 (01 May 2022 17:45)  T(F): 98.4 (02 May 2022 12:00), Max: 98.6 (01 May 2022 17:45)  HR: 70 (02 May 2022 12:00) (70 - 117)  BP: 123/82 (02 May 2022 12:00) (104/72 - 123/82)  BP(mean): 85 (02 May 2022 05:15) (85 - 85)  RR: 17 (02 May 2022 12:00) (17 - 19)  SpO2: 98% (02 May 2022 12:00) (98% - 98%)
Vital Signs Last 24 Hrs  T(C): 36.7 (03 May 2022 11:30), Max: 36.7 (02 May 2022 22:30)  T(F): 98.1 (03 May 2022 11:30), Max: 98.1 (02 May 2022 22:30)  HR: 66 (03 May 2022 11:30) (66 - 90)  BP: 112/71 (03 May 2022 11:30) (104/62 - 112/71)  BP(mean): --  RR: 18 (03 May 2022 11:30) (18 - 18)  SpO2: 97% (03 May 2022 11:30) (97% - 98%)
Vital Signs Last 24 Hrs  T(C): 36.7 (29 Apr 2022 05:54), Max: 36.9 (28 Apr 2022 12:44)  T(F): 98.1 (29 Apr 2022 05:54), Max: 98.4 (28 Apr 2022 12:44)  HR: 86 (29 Apr 2022 05:54) (86 - 98)  BP: 103/57 (29 Apr 2022 05:54) (103/57 - 128/81)  BP(mean): --  RR: 17 (29 Apr 2022 05:54) (17 - 18)  SpO2: 97% (29 Apr 2022 05:54) (97% - 99%)
Vital Signs Last 24 Hrs  T(C): 36.6 (27 Apr 2022 05:40), Max: 36.6 (27 Apr 2022 05:40)  T(F): 97.8 (27 Apr 2022 05:40), Max: 97.8 (27 Apr 2022 05:40)  HR: 94 (27 Apr 2022 05:40) (94 - 98)  BP: 104/62 (27 Apr 2022 05:40) (102/67 - 104/62)  BP(mean): --  RR: 18 (27 Apr 2022 05:40) (18 - 18)  SpO2: 98% (27 Apr 2022 05:40) (98% - 98%)
Vital Signs Last 24 Hrs  T(C): 36.4 (25 Apr 2022 11:31), Max: 36.7 (25 Apr 2022 03:00)  T(F): 97.5 (25 Apr 2022 11:31), Max: 98.1 (25 Apr 2022 03:00)  HR: 99 (25 Apr 2022 11:31) (82 - 99)  BP: 117/71 (25 Apr 2022 11:31) (115/80 - 126/85)  BP(mean): --  RR: 18 (25 Apr 2022 11:31) (18 - 18)  SpO2: 99% (25 Apr 2022 11:31) (98% - 99%)

## 2022-05-03 NOTE — DISCHARGE NOTE NURSING/CASE MANAGEMENT/SOCIAL WORK - PATIENT PORTAL LINK FT
You can access the FollowMyHealth Patient Portal offered by [facility] by registering at the following website: [email or web address]. By joining [employer]’s FollowMyHealth portal, you will also be able to view your health information using other applications (apps) compatible with our system.

## 2022-05-03 NOTE — BH INPATIENT PSYCHIATRY ASSESSMENT NOTE - MSE UNSTRUCTURED FT
Patient is awake and alert. Affect is neutral, calm. Cooperative with exam. Fluent speech. Coherent thought process. "I feel ok" No delusions. No movement disorder. No perceptual disturbance. Fair insight. No suicidal ideations.

## 2022-05-03 NOTE — PROGRESS NOTE ADULT - ASSESSMENT
22 yo F w/ HLD, IBS, anxiety/depression presenting from [facility] (voluntary admission d/t suicidal ideation) due to COVID+ status and need for isolation. Currently planned for return to [facility] once done w/ isolation. 
22 y/o Female PMH HLD, IBS, anxiety and depression sent from [facility] (d/t suicidal ideations on Monday) presents in ED COVID positive. Patient states she has been experiencing upper respiratory symptoms including cough, rhinorrhea, sore throat and body aches since Wednesday. Admitted to hospital to maintain isolation for COVID infection. 
20 yo F w/ HLD, IBS, anxiety/depression presenting from [facility] (voluntary admission d/t suicidal ideation) due to COVID+ status and need for isolation. Currently planned for return to [facility] once done w/ isolation. 
22 yo F w/ HLD, IBS, anxiety/depression presenting from [facility] (voluntary admission d/t suicidal ideation) due to COVID+ status and need for isolation. Currently undergoing psychiatric reassessment.     # COVID19  - unable to stay at [facility] due to COVID+ status  - with minimal upper respiratory symptoms, denies SOB, is not hypoxic  - does not require steroids/remdesivir at this time  - c/w symptomatic management of cough/supportive care  - ddimer <150    # Depression w/ suicidal ideation  - had suicide attempt 4/16 (wine, klonopin, trazodone, melatonin)  - was on 1:1, c/w duloxetine, remeron with atarax prn anxiety  - f/u further psych recs and safety re-assessment, felt to be clinically improving, to determine if still needs inpatient psychiatric hospitalization (or if can be safely discharged home with perhaps enrollment in partial program)    # HLD  - c/w statin    Need for prophylactic measure   VTE ppx: lovenox        Dispo: pending psychiatric safety reassessment    
22 yo F w/ HLD, IBS, anxiety/depression presenting from [facility] (voluntary admission d/t suicidal ideation) due to COVID+ status and need for isolation. Currently undergoing psychiatric reassessment.     # COVID19  - unable to stay at [facility] due to COVID+ status  - with minimal upper respiratory symptoms, denies SOB, is not hypoxic  - does not require steroids/remdesivir at this time  - c/w symptomatic management of cough/supportive care  - ddimer <150    # Depression w/ suicidal ideation  - had suicide attempt 4/16 (wine, klonopin, trazodone, melatonin)  - was on 1:1, c/w duloxetine, remeron with atarax prn anxiety  - f/u further psych recs and safety re-assessment, felt to be clinically improving, to determine if still needs inpatient psychiatric hospitalization (or if can be safely discharged home with perhaps enrollment in partial program)    # HLD  - c/w statin    Need for prophylactic measure   VTE ppx: lovenox        Dispo: pending psychiatric safety reassessment
20 yo F w/ HLD, IBS, anxiety/depression presenting from [facility] (voluntary admission d/t suicidal ideation) due to COVID+ status and need for isolation. Currently planned for return to [facility] as now done w/ isolation.       # COVID19  - was unable to stay at [facility] due to COVID+ status  - with minimal upper respiratory symptoms, denies SOB, is not hypoxic  - does not require steroids/remdesivir at this time  - ddimer <150  - completed 10 day course of isolation (initial PCR positive 4/22), medically stable for return to [facility]    # Depression w/ suicidal ideation  - had suicide attempt 4/16 (wine, klonopin, trazodone, melatonin)  - was on 1:1 (no current need for constant observation), c/w duloxetine, remeron with atarax prn anxiety  - appreciate psych input, plan is for patient to return to [facility] for further psychiatric management (patient did not feel safe going home w/ day program, patient/family prefers return to [facility] d/t multiple psych hospitalizations and recent suicide attempt), voluntary paperwork signed    # HLD  - c/w statin.    # Need for prophylactic measure  - VTE ppx: lovenox  - Dispo: return to [facility]    Patient and father in agreement with discharge plan to [facility]. Care coordinated. Discharge time 31 minutes. 
20 yo F w/ HLD, IBS, anxiety/depression presenting from [facility] (voluntary admission d/t suicidal ideation) due to COVID+ status and need for isolation.     # COVID19  - unable to stay at [facility] due to COVID+ status  - with minimal upper respiratory symptoms, denies SOB, is not hypoxic  - does not require steroids/remdesivir at this time  - c/w symptomatic management of cough/supportive care  - ddimer <150    # Depression w/ suicidal ideation  - had suicide attempt 4/16 (wine, klonopin, trazodone, melatonin)  - currently on 1:1, c/w duloxetine, remeron with atarax prn anxiety  - f/u further psych recs and safety assessment, felt to be clinically improving, to determine if still needs inpatient psychiatric hospitalization     # HLD  - c/w statin    # Hypokalemia   - replete K    Need for prophylactic measure   VTE ppx: lovenox  
20 yo F w/ HLD, IBS, anxiety/depression presenting from [facility] (voluntary admission d/t suicidal ideation) due to COVID+ status and need for isolation. Currently planned for return to [facility] once done w/ isolation.       # COVID19  - unable to stay at [facility] due to COVID+ status  - with minimal upper respiratory symptoms, denies SOB, is not hypoxic  - does not require steroids/remdesivir at this time  - c/w symptomatic management of cough/supportive care  - ddimer <150.    # Depression w/ suicidal ideation  - had suicide attempt 4/16 (wine, klonopin, trazodone, melatonin)  - was on 1:1 (no current need for constant observation), c/w duloxetine, remeron with atarax prn anxiety  - appreciate psych input, plan is for patient to return to [facility] for further psychiatric management (patient did not feel safe going home w/ day program, patient/family prefers return to [facility] d/t multiple psych hospitalizations and recent suicide attempt), await completion of isolation period.    # HLD  - c/w statin.    # Need for prophylactic measure  - VTE ppx: lovenox  - Dispo: [facility] pending completion of isolation period for COVID.ie 10 day quarantine, can return on day 11, (initial PCR positive 4/22, is still positive 5/2)  
20 yo F w/ HLD, IBS, anxiety/depression presenting from [facility] (voluntary admission d/t suicidal ideation) due to COVID+ status and need for isolation.     # COVID19  - unable to stay at [facility] due to COVID+ status  - with minimal upper respiratory symptoms, denies SOB, is not hypoxic  - does not require steroids/remdesivir at this time  - c/w symptomatic management of cough/supportive care  - ddimer <150    # Depression w/ suicidal ideation  - had suicide attempt 4/16 (wine, klonopin, trazodone, melatonin)  - currently on 1:1, c/w duloxetine, remeron with atarax prn anxiety  - f/u further psych recs and safety re-assessment, felt to be clinically improving, to determine if still needs inpatient psychiatric hospitalization (or if can be safely discharged home)    # HLD  - c/w statin    # Hypokalemia   - improved s/p repletion, continue to monitor and replete prn    Need for prophylactic measure   VTE ppx: lovenox        
22 y/o Female PMH HLD, IBS, anxiety and depression sent from [facility] (d/t suicidal ideations on Monday) presents in ED COVID positive. Patient states she has been experiencing upper respiratory symptoms including cough, rhinorrhea, sore throat and body aches since Wednesday. Admitted to hospital to maintain isolation for COVID infection. 
22 yo F w/ HLD, IBS, anxiety/depression presenting from [facility] (voluntary admission d/t suicidal ideation) due to COVID+ status and need for isolation. Currently planned for return to [facility] once done w/ isolation.

## 2022-05-04 PROCEDURE — 99222 1ST HOSP IP/OBS MODERATE 55: CPT

## 2022-05-04 PROCEDURE — 90853 GROUP PSYCHOTHERAPY: CPT

## 2022-05-04 PROCEDURE — 99232 SBSQ HOSP IP/OBS MODERATE 35: CPT | Mod: 25

## 2022-05-04 RX ORDER — PRAZOSIN HCL 2 MG
2 CAPSULE ORAL AT BEDTIME
Refills: 0 | Status: DISCONTINUED | OUTPATIENT
Start: 2022-05-04 | End: 2022-05-05

## 2022-05-04 RX ADMIN — LORATADINE 10 MILLIGRAM(S): 10 TABLET ORAL at 08:37

## 2022-05-04 RX ADMIN — DULOXETINE HYDROCHLORIDE 90 MILLIGRAM(S): 30 CAPSULE, DELAYED RELEASE ORAL at 08:37

## 2022-05-04 RX ADMIN — ATORVASTATIN CALCIUM 10 MILLIGRAM(S): 80 TABLET, FILM COATED ORAL at 21:21

## 2022-05-04 RX ADMIN — Medication 2 MILLIGRAM(S): at 21:21

## 2022-05-04 NOTE — BH SOCIAL WORK INITIAL PSYCHOSOCIAL EVALUATION - NSCMSPTSTRENGTHS_PSY_ALL_CORE
Able to adapt/Assertive/Compliance to treatment/Courageous/Expressive of emotions/Anamaria/spirituality/Flexibility/Future/goal oriented/Highly motivated for treatment/Intact family/Intelligence/Positive attitude/Resourceful/Supportive family

## 2022-05-04 NOTE — CONSULT NOTE ADULT - ASSESSMENT
21F with PMH of HLD, IBS, anxiety/depression admitted to [facility] for management of SI, decompensated depression.    #HLD  -Stable. Cont statin.    #Depression/Anxiety  -Management as per psychiatry.    #IBS  -Supportive measures.    #Recent COVID-19  -Asymptomatic. NO respiratory issues. Stable. Supportive/routine management.        Feel free to reach out if any other questions/concerns.

## 2022-05-04 NOTE — BH SOCIAL WORK INITIAL PSYCHOSOCIAL EVALUATION - OTHER PAST PSYCHIATRIC HISTORY (INCLUDE DETAILS REGARDING ONSET, COURSE OF ILLNESS, INPATIENT/OUTPATIENT TREATMENT)
1 prior hospitalization at  at [facility] 2/28-3/9, outpatient care under Dr. Morataya for past 3 years, therapist Aida Martines

## 2022-05-04 NOTE — PSYCHIATRIC REHAB INITIAL EVALUATION - NSBHPRRECOMMEND_PSY_ALL_CORE
Writer met with patient in order to orient patient to unit and introduce patient to psychiatric staff and department functions. Patient was verbal, polite, and forthcoming according to ability with personal information on interview.     Pt was admitted for depression, anxiety and suicidal gesture. Pt stated she felt invalidated from parents at home. Pt was admitted to [facility] for Covid last week and returned to out unit. Pt stated her mood is good and is future oriented. Pt discussed BPD diagnosis with high sensitivity and reactivity. Pt was receptive. Pt denied substance use. Pt denied any active SI/HI/brielle/psychosis.  Pt wants to live a normal life and is motivated.    Writer collaborated with patient to select an appropriate psychiatric rehabilitation goal. Pt was receptive. Psychiatric rehabilitation staff will continue to engage patient daily in order to develop therapeutic rapport. In response to COVID19, unit programming will be re-evaluated on a consistent basis in an effort to maintain safety guidelines. Pt was given a programming schedule. Writer encouraged pt to attend group therapy in the unit.

## 2022-05-04 NOTE — CONSULT NOTE ADULT - SUBJECTIVE AND OBJECTIVE BOX
HPI:   21F with PMH of HLD, IBS, anxiety/depression who originally presented with SI. She was found to be COVID positive so admitted to [facility] for isolation. Pt denied any COVID sx. Isolation period was uneventful. She denied any recent illnesses, sick contacts. Denies any bowel/urinary complaints. She is tolerating PO intake. And her current mood is stable.  Her other chronic conditions are otherwise stable - she takes statin for HLD. IBS sx are stable.    PAST MEDICAL & SURGICAL HISTORY:  Anxiety disorder    Hyperlipidemia    Major depression    Ankle fracture, left        Review of Systems: All other systems negative except as noted above.     Allergies    No Known Allergies    Intolerances        Social History:  No tobacco, drug, ETOH use.     FAMILY HISTORY:  Family history of early CAD (Father)        MEDICATIONS  (STANDING):  atorvastatin 10 milliGRAM(s) Oral at bedtime  DULoxetine 90 milliGRAM(s) Oral daily  loratadine 10 milliGRAM(s) Oral daily  prazosin. 2 milliGRAM(s) Oral at bedtime  Vestura 3-0.02mg 1 Tablet(s) 1 Tablet(s) Oral at bedtime    MEDICATIONS  (PRN):  hydrOXYzine hydrochloride 25 milliGRAM(s) Oral every 6 hours PRN Anxiety  LORazepam     Tablet 1 milliGRAM(s) Oral every 6 hours PRN Agitation  LORazepam   Injectable 1 milliGRAM(s) IntraMuscular once PRN Severe Agitation      PHYSICAL EXAM:  VS- 113/67, 115, 97.9F  GENERAL: NAD, well-developed  HEAD:  Atraumatic, Normocephalic  EYES: EOMI, conjunctiva and sclera clear  NECK: Supple, No JVD  CHEST/LUNG:  Normal inspiratory effort. No accessory muscle use.  EXTREMITIES:  No clubbing, cyanosis, or edema  NEUROLOGY: Normal gait. Speech normal. Moves all extremities.  SKIN: No visible rashes or lesions    LABS:  No labs today.

## 2022-05-05 PROCEDURE — 99232 SBSQ HOSP IP/OBS MODERATE 35: CPT

## 2022-05-05 RX ORDER — PRAZOSIN HCL 2 MG
4 CAPSULE ORAL AT BEDTIME
Refills: 0 | Status: DISCONTINUED | OUTPATIENT
Start: 2022-05-05 | End: 2022-05-16

## 2022-05-05 RX ADMIN — LORATADINE 10 MILLIGRAM(S): 10 TABLET ORAL at 09:15

## 2022-05-05 RX ADMIN — ATORVASTATIN CALCIUM 10 MILLIGRAM(S): 80 TABLET, FILM COATED ORAL at 21:59

## 2022-05-05 RX ADMIN — DULOXETINE HYDROCHLORIDE 90 MILLIGRAM(S): 30 CAPSULE, DELAYED RELEASE ORAL at 09:15

## 2022-05-05 RX ADMIN — Medication 4 MILLIGRAM(S): at 21:59

## 2022-05-06 PROCEDURE — 99232 SBSQ HOSP IP/OBS MODERATE 35: CPT

## 2022-05-06 RX ORDER — DULOXETINE HYDROCHLORIDE 30 MG/1
120 CAPSULE, DELAYED RELEASE ORAL DAILY
Refills: 0 | Status: DISCONTINUED | OUTPATIENT
Start: 2022-05-07 | End: 2022-05-16

## 2022-05-06 RX ADMIN — ATORVASTATIN CALCIUM 10 MILLIGRAM(S): 80 TABLET, FILM COATED ORAL at 20:46

## 2022-05-06 RX ADMIN — Medication 4 MILLIGRAM(S): at 20:45

## 2022-05-06 RX ADMIN — LORATADINE 10 MILLIGRAM(S): 10 TABLET ORAL at 09:12

## 2022-05-06 RX ADMIN — DULOXETINE HYDROCHLORIDE 90 MILLIGRAM(S): 30 CAPSULE, DELAYED RELEASE ORAL at 09:11

## 2022-05-06 NOTE — CHART NOTE - NSCHARTNOTEFT_GEN_A_CORE
Nutrition note:  Consult ordered for assessment, patient request.  Source: Patient [ x]    Family [ ]     other [ ]    Diet : Diet, Regular:   Kosher (05-04-22 @ 16:01)    Patient admitted to [facility] with COVID and transferred back to [facility] for continued psychiatric treatment. Patient endorses good po intake. States had increased appetite when on Remeron, was d/c'd; patient states "My appetite is back to normal". Follows a Kosher diet. Dislikes some of the entrees (tuna fish wrap, BBQ chicken, meatloaf).     Patient reports [ ] nausea  [ ] vomiting [ ] diarrhea [ ] constipation  [ ]chewing problems [ ] swallowing issues  [ ] other:     PO intake:  < 50% [ ] 50-75% [ ]   % [x ]  other :    Source for PO intake [x ] Patient [ ] family [ ] chart [ ] staff [ ] other      Current Weight: Weight (kg): 72.5  (159.5#) (04-30 @ 09:42)      Pertinent Medications: MEDICATIONS  (STANDING):  atorvastatin 10 milliGRAM(s) Oral at bedtime  DULoxetine 90 milliGRAM(s) Oral daily  loratadine 10 milliGRAM(s) Oral daily  prazosin. 4 milliGRAM(s) Oral at bedtime  Vestura 3-0.02mg 1 Tablet(s) 1 Tablet(s) Oral at bedtime    MEDICATIONS  (PRN):  hydrOXYzine hydrochloride 25 milliGRAM(s) Oral every 6 hours PRN Anxiety  LORazepam     Tablet 1 milliGRAM(s) Oral every 6 hours PRN Agitation  LORazepam   Injectable 1 milliGRAM(s) IntraMuscular once PRN Severe Agitation    Pertinent Labs:   05-02 Phos 4.2 mg/dL 04-23 Chol 140 mg/dL LDL --    HDL 63 mg/dL Trig 147 mg/dL    Skin: intact    Estimated Needs:   [x ] no change since previous assessment  [ ] recalculated:     Previous Nutrition Diagnosis:     [ ] Inadequate Energy Intake [ ]Inadequate Oral Intake [ ] Excessive Energy Intake     [ ] Underweight [ ] Increased Nutrient Needs [ ] Overweight/Obesity     [ ] Altered GI Function [ ] Unintended Weight Loss [ ] Food & Nutrition Related Knowledge Deficit [ x] Malnutrition        Nutrition Diagnosis is [ ] ongoing  [ x] resolved [ ] not applicable        New Nutrition Diagnosis: [ x] not applicable    [ ] Inadequate Protein Energy Intake [ ]Inadequate Oral Intake [ ] Excessive Energy Intake     [ ] Underweight [ ] Increased Nutrient Needs [ ] Overweight/Obesity     [ ] Altered GI Function [ ] Unintended Weight Loss [ ] Food & Nutrition Related Knowledge Deficit[ ] Limited Adherence to nutrition related recommendations [ ] Malnutrition  [ ] other: Free text       Recommend/Plan    [x] Maintain prescribed diet    [x] Provide food preferences      Monitoring and Evaluation:     [ x] PO intake [ ] Tolerance to diet prescription [ x] weights [ x] follow up per protocol    [ x] other: Lynn Zaidi MS RDN  Pager #[phone]

## 2022-05-07 PROCEDURE — 99231 SBSQ HOSP IP/OBS SF/LOW 25: CPT

## 2022-05-07 RX ADMIN — ATORVASTATIN CALCIUM 10 MILLIGRAM(S): 80 TABLET, FILM COATED ORAL at 21:28

## 2022-05-07 RX ADMIN — DULOXETINE HYDROCHLORIDE 120 MILLIGRAM(S): 30 CAPSULE, DELAYED RELEASE ORAL at 09:49

## 2022-05-07 RX ADMIN — LORATADINE 10 MILLIGRAM(S): 10 TABLET ORAL at 09:49

## 2022-05-07 RX ADMIN — Medication 4 MILLIGRAM(S): at 21:28

## 2022-05-07 NOTE — BH INPATIENT PSYCHIATRY PROGRESS NOTE - MSE UNSTRUCTURED FT
On exam today the patient is generally cooperative with fair eye contact.    Speech is clear and of normal rate.    Thought process: with no evidence of a disorder of thought process.    Thought content: with no evidence of psychotic beliefs.  Perception: Denies hallucinations.    Mood: Describes as "less depressed and less anxious today".  Affect: constricted.    Patient denies active suicidal ideation, intent and plan.    Patient denies active aggressive/homicidal ideation, intent or plan.   Patient is Alert and oriented in all spheres. Patient is cognitively grossly intact.   Insight and judgment are limited. Impulse control is intact at this time.

## 2022-05-08 RX ADMIN — DULOXETINE HYDROCHLORIDE 120 MILLIGRAM(S): 30 CAPSULE, DELAYED RELEASE ORAL at 09:15

## 2022-05-08 RX ADMIN — Medication 4 MILLIGRAM(S): at 21:14

## 2022-05-08 RX ADMIN — LORATADINE 10 MILLIGRAM(S): 10 TABLET ORAL at 09:16

## 2022-05-08 RX ADMIN — ATORVASTATIN CALCIUM 10 MILLIGRAM(S): 80 TABLET, FILM COATED ORAL at 21:14

## 2022-05-09 PROCEDURE — 90853 GROUP PSYCHOTHERAPY: CPT

## 2022-05-09 PROCEDURE — 99231 SBSQ HOSP IP/OBS SF/LOW 25: CPT | Mod: 25

## 2022-05-09 RX ORDER — CALCIUM CARBONATE 500(1250)
1 TABLET ORAL THREE TIMES A DAY
Refills: 0 | Status: DISCONTINUED | OUTPATIENT
Start: 2022-05-09 | End: 2022-05-16

## 2022-05-09 RX ADMIN — DULOXETINE HYDROCHLORIDE 120 MILLIGRAM(S): 30 CAPSULE, DELAYED RELEASE ORAL at 09:24

## 2022-05-09 RX ADMIN — LORATADINE 10 MILLIGRAM(S): 10 TABLET ORAL at 09:25

## 2022-05-09 RX ADMIN — Medication 4 MILLIGRAM(S): at 20:52

## 2022-05-09 RX ADMIN — ATORVASTATIN CALCIUM 10 MILLIGRAM(S): 80 TABLET, FILM COATED ORAL at 20:52

## 2022-05-10 PROCEDURE — 99231 SBSQ HOSP IP/OBS SF/LOW 25: CPT

## 2022-05-10 PROCEDURE — 90853 GROUP PSYCHOTHERAPY: CPT

## 2022-05-10 RX ADMIN — DULOXETINE HYDROCHLORIDE 120 MILLIGRAM(S): 30 CAPSULE, DELAYED RELEASE ORAL at 09:23

## 2022-05-10 RX ADMIN — LORATADINE 10 MILLIGRAM(S): 10 TABLET ORAL at 09:23

## 2022-05-10 RX ADMIN — ATORVASTATIN CALCIUM 10 MILLIGRAM(S): 80 TABLET, FILM COATED ORAL at 20:38

## 2022-05-10 RX ADMIN — Medication 4 MILLIGRAM(S): at 20:38

## 2022-05-11 PROCEDURE — 99231 SBSQ HOSP IP/OBS SF/LOW 25: CPT | Mod: 25

## 2022-05-11 PROCEDURE — 90853 GROUP PSYCHOTHERAPY: CPT

## 2022-05-11 RX ORDER — POLYETHYLENE GLYCOL 3350 17 G/17G
17 POWDER, FOR SOLUTION ORAL DAILY
Refills: 0 | Status: DISCONTINUED | OUTPATIENT
Start: 2022-05-11 | End: 2022-05-16

## 2022-05-11 RX ADMIN — Medication 4 MILLIGRAM(S): at 22:09

## 2022-05-11 RX ADMIN — ATORVASTATIN CALCIUM 10 MILLIGRAM(S): 80 TABLET, FILM COATED ORAL at 22:08

## 2022-05-11 RX ADMIN — DULOXETINE HYDROCHLORIDE 120 MILLIGRAM(S): 30 CAPSULE, DELAYED RELEASE ORAL at 08:58

## 2022-05-11 RX ADMIN — POLYETHYLENE GLYCOL 3350 17 GRAM(S): 17 POWDER, FOR SOLUTION ORAL at 12:54

## 2022-05-11 RX ADMIN — LORATADINE 10 MILLIGRAM(S): 10 TABLET ORAL at 08:57

## 2022-05-11 RX ADMIN — Medication 1 TABLET(S): at 12:55

## 2022-05-11 NOTE — BH DISCHARGE NOTE NURSING/SOCIAL WORK/PSYCH REHAB - DISCHARGE INSTRUCTIONS AFTERCARE APPOINTMENTS
In order to check the location, date, or time of your aftercare appointment, please refer to your Discharge Instructions Document given to you upon leaving the hospital.  If you have lost the instructions please call [phone]

## 2022-05-11 NOTE — BH DISCHARGE NOTE NURSING/SOCIAL WORK/PSYCH REHAB - NSDCADDINFO1FT_PSY_ALL_CORE
Please be aware that your first appointment will be from 8am-1pm, you will be meeting with a therapist and psychiatrist to complete your initial evaluation. The following days will be from 9a-1pm, lunch will be provided towards the end of your session. Please bring your insurance and identification card.

## 2022-05-11 NOTE — BH DISCHARGE NOTE NURSING/SOCIAL WORK/PSYCH REHAB - NSDCPRGOAL_PSY_ALL_CORE
Writer met with patient to discuss patient’s discharge and progress towards rehabilitation goals. Patient was pleasant and cooperative to meet with writer. Patient was able to complete in safety plan with psychology and was given a survey prior to discharge. Patient was admitted to [facility] on 5/3/2022 due to worsening depression and anxiety. During current hospitalization, patient reported decrease in anxiety. She expressed some nervousness about discharge though she stated that engaging in exposure therapy during her hospitalization has been helpful. Patient presented with bright affect and demonstrated future-oriented thought. Patient has been adherent to medication and reported no side effects. Patient denied suicidal ideation. Patient has met her goal of identifying and utilizing coping skills for improved symptom management. Patient identified mindfulness, getting fresh air, watching TV, and TIPP skill as ways to self-soothe and manage symptoms. Due to the COVID-19 pandemic, unit structure and activities are re-evaluated on a consistent basis in effort to maintain the safety of patients and staff. Patient attended some psychiatric rehabilitation groups and was engaged appropriately during sessions. Patient attended coping skill, creative arts therapy, DBT, leisure, and peer advocate groups. Patient was visible on the unit and maintained good behavioral control. Patient was social with select peers and able to make needs known to staff.

## 2022-05-11 NOTE — BH DISCHARGE NOTE NURSING/SOCIAL WORK/PSYCH REHAB - NSDCPRRECOMMEND_PSY_ALL_CORE
Psychiatric Rehabilitation staff recommends that patient begins treatment with [facility] for ongoing medication management, support, and psychotherapy. In addition, psych rehab recommends patient continue to explore and utilize coping skills for improved symptom management and sustained recovery.

## 2022-05-11 NOTE — BH DISCHARGE NOTE NURSING/SOCIAL WORK/PSYCH REHAB - NSCDUDCCRISIS_PSY_A_CORE
[facility]  1 (030) [facility]LL ([phone])  Text "WELL" to 86770  Website: www.[employer]/.[employer] [facility] 1 (500) 691-IAYG (7209) Website: www.safehorizon.org/.National Suicide Prevention Lifeline 8 (365) 077-5984/.  [employer]  1 (609) LIFENET (127-1173)/.  [employer]  [address] [place] [address] [phone]   Hours:  Monday through Friday from 9 AM to 3 PM/.  [employer] - Veterans Crisis Line  6 ([phone], Option 1

## 2022-05-11 NOTE — BH DISCHARGE NOTE NURSING/SOCIAL WORK/PSYCH REHAB - NSBHDCADDR1FT_A_CORE
In Person: [address] [facility], [place], [place]  Back Entrance of Building

## 2022-05-11 NOTE — BH DISCHARGE NOTE NURSING/SOCIAL WORK/PSYCH REHAB - PATIENT PORTAL LINK FT
You can access the FollowMyHealth Patient Portal offered by [facility] by registering at the following website: [email or web address]. By joining [facility]’s FollowMyHealth portal, you will also be able to view your health information using other applications (apps) compatible with our system.

## 2022-05-12 PROCEDURE — 99231 SBSQ HOSP IP/OBS SF/LOW 25: CPT

## 2022-05-12 PROCEDURE — 90853 GROUP PSYCHOTHERAPY: CPT

## 2022-05-12 RX ORDER — ACETAMINOPHEN 500 MG
650 TABLET ORAL EVERY 6 HOURS
Refills: 0 | Status: DISCONTINUED | OUTPATIENT
Start: 2022-05-12 | End: 2022-05-16

## 2022-05-12 RX ADMIN — POLYETHYLENE GLYCOL 3350 17 GRAM(S): 17 POWDER, FOR SOLUTION ORAL at 10:09

## 2022-05-12 RX ADMIN — LORATADINE 10 MILLIGRAM(S): 10 TABLET ORAL at 10:09

## 2022-05-12 RX ADMIN — ATORVASTATIN CALCIUM 10 MILLIGRAM(S): 80 TABLET, FILM COATED ORAL at 21:04

## 2022-05-12 RX ADMIN — Medication 4 MILLIGRAM(S): at 21:04

## 2022-05-12 RX ADMIN — DULOXETINE HYDROCHLORIDE 120 MILLIGRAM(S): 30 CAPSULE, DELAYED RELEASE ORAL at 10:07

## 2022-05-12 NOTE — BH PSYCHOLOGY - GROUP THERAPY NOTE - NSPSYCHOLGRPGENINT_PSY_A_CORE
cognitive/behavioral therapy

## 2022-05-12 NOTE — BH PSYCHOLOGY - GROUP THERAPY NOTE - NSPSYCHOLGRPGENPT_PSY_A_CORE FT
[employer] is an ACT-based group in which patients learn skills and explore themes of identity, compassion, resilience, and connection through the lens of marginalized identity. Group begins with setting group expectations and introductions that focus on identity exploration. Following introductions, the daily theme is presented through both didactics and experiential activities. Group today focused on the theme “compassion.” Patients were provided psychoeducation about compassion and engaged in discussion about the usefulness of self-compassion in their own lives.  led patients in discussions surrounding feelings of shame and marginalized identity, highlighting the role self-compassion can play in alleviating suffering.   taught two new coping strategies to assist in building self-compassion and defusing shame.  
[employer] is an ACT-based group in which patients learn skills and explore themes of identity, compassion, resilience, and connection through the lens of marginalized identity. Group begins with setting group expectations and introductions that focus on identity exploration. Following introductions, the daily theme is presented through both didactics and experiential activities. Group today focused on the theme “identity.” Patients were provided psychoeducation about identity and engaged in discussion about types of identity and the importance of values on identity formation.  provided experiential activities that allowed patients to engage in their own values exploration, determining the unique values that help to form their identities.  
[employer] is an ACT-based group in which patients learn skills and explore themes of identity, compassion, resilience, and connection through the lens of marginalized identity. Group begins with setting group expectations and introductions that focus on identity exploration. Following introductions, the daily theme is presented through both didactics and experiential activities. Group today focused on the theme “resilience.” Patients were provided psychoeducation about resilience and engaged in discussion about the importance of developing resilience to cope with stressful times.  led patients in an experiential exercise that focused on positive inner self-talk and growing resilience.  
[employer] is an ACT-based group in which patients learn skills and explore themes of identity, compassion, resilience, and connection through the lens of marginalized identity. Group begins with setting group expectations and introductions that focus on identity exploration. Following introductions, the daily theme is presented through both didactics and experiential activities. Group today focused on the theme “connection.” Patients were provided psychoeducation about connection and engaged in discussion about the importance of developing healthy relationships.  led patients in an experiential exercise titled “Taking a Relationship Inventory” and helped patients to connect their values to their relationships.

## 2022-05-12 NOTE — BH PSYCHOLOGY - GROUP THERAPY NOTE - NSPSYCHOLGRPBILLING_PSY_A_CORE
[id number] - Group Psychotherapy
[id number] - Group Psychotherapy
[id number] - Group Psychotherapy
[id number] - Group Psychotherapy
[id number] - Group Psychotherapy
[id number] - Group Psychotherapy
[id number] - Group Psychotherapy
[id number] - Group Psychotherapy
[id number] - Group Psychotherapy
[id number] - Group Psychotherapy

## 2022-05-12 NOTE — ED BEHAVIORAL HEALTH ASSESSMENT NOTE - NS ED BHA ED COURSE PSYCHIATRIC MEDICATION GIVEN
For the ;ast 4-6 weeks his taste and smell have been getting better 
Oral Medication (indication, name, dose, time)

## 2022-05-12 NOTE — BH PSYCHOLOGY - GROUP THERAPY NOTE - NSPSYCHOLGRPGENGOAL_PSY_A_CORE
improve social/vocational/coping skills/psychoeducation

## 2022-05-13 PROCEDURE — 90832 PSYTX W PT 30 MINUTES: CPT

## 2022-05-13 PROCEDURE — 99238 HOSP IP/OBS DSCHRG MGMT 30/<: CPT | Mod: 1L

## 2022-05-13 PROCEDURE — 99231 SBSQ HOSP IP/OBS SF/LOW 25: CPT | Mod: 1L

## 2022-05-13 RX ORDER — DULOXETINE HYDROCHLORIDE 30 MG/1
2 CAPSULE, DELAYED RELEASE ORAL
Qty: 60 | Refills: 0
Start: 2022-05-13 | End: 2022-06-11

## 2022-05-13 RX ORDER — MIRTAZAPINE 45 MG/1
1 TABLET, ORALLY DISINTEGRATING ORAL
Qty: 0 | Refills: 0 | DISCHARGE

## 2022-05-13 RX ORDER — PRAZOSIN HCL 2 MG
2 CAPSULE ORAL
Qty: 60 | Refills: 0
Start: 2022-05-13 | End: 2022-06-11

## 2022-05-13 RX ADMIN — ATORVASTATIN CALCIUM 10 MILLIGRAM(S): 80 TABLET, FILM COATED ORAL at 21:12

## 2022-05-13 RX ADMIN — Medication 1 TABLET(S): at 12:33

## 2022-05-13 RX ADMIN — POLYETHYLENE GLYCOL 3350 17 GRAM(S): 17 POWDER, FOR SOLUTION ORAL at 09:25

## 2022-05-13 RX ADMIN — DULOXETINE HYDROCHLORIDE 120 MILLIGRAM(S): 30 CAPSULE, DELAYED RELEASE ORAL at 09:25

## 2022-05-13 RX ADMIN — LORATADINE 10 MILLIGRAM(S): 10 TABLET ORAL at 09:25

## 2022-05-13 RX ADMIN — Medication 4 MILLIGRAM(S): at 21:12

## 2022-05-13 NOTE — BH INPATIENT PSYCHIATRY DISCHARGE NOTE - NSDCMRMEDTOKEN_GEN_ALL_CORE_FT
atorvastatin 10 mg oral tablet: 1 tab(s) orally once a day (at bedtime)  Cymbalta 60 mg oral delayed release capsule: 2 cap(s) orally once a day  loratadine 10 mg oral tablet: 1 tab(s) orally once a day  prazosin 2 mg oral capsule: 2 cap(s) orally once a day (at bedtime)  Vestura 3 mg-0.02 mg oral tablet: 1 tab(s) orally once a day

## 2022-05-13 NOTE — BH PSYCHOLOGY - GROUP THERAPY NOTE - NSBHPTASSESSDT_PSY_A_CORE
10-May-2022 11:00
13-May-2022 11:15
05-May-2022 11:15
05-May-2022 15:15
04-May-2022 09:15
12-May-2022 11:00
12-May-2022 15:15
03-May-2022 17:15
10-May-2022 17:15
09-May-2022 11:15
11-May-2022 09:15

## 2022-05-13 NOTE — BH PSYCHOLOGY - GROUP THERAPY NOTE - NSBHPSYCHOLGRPDUR_PSY_A_CORE
45 minutes

## 2022-05-13 NOTE — BH TREATMENT PLAN - NSTXDCOPLKINTERSW_PSY_ALL_CORE
Treatment team would like to refer pt Sunil Alarcon (4/30/1996). He has  d/c Friday is there anything available for early next week.
SW will provide pt with support, discuss aftercare plans, make appropriate referrals for mental health and discuss discharge readiness with team.

## 2022-05-13 NOTE — BH INPATIENT PSYCHIATRY DISCHARGE NOTE - NSBHMETABOLIC_PSY_ALL_CORE_FT
BMI: BMI (kg/m2): 27.4 (05-03-22 @ 17:30)  HbA1c: A1C with Estimated Average Glucose Result: 4.6 % (04-19-22 @ 11:13)    Glucose: POCT Blood Glucose.: 122 mg/dL (04-17-22 @ 16:05)    BP: 127/70 (05-13-22 @ 07:58) (109/67 - 127/70)  Lipid Panel: Date/Time: 04-23-22 @ 07:02  Cholesterol, Serum: 140  Direct LDL: --  HDL Cholesterol, Serum: 63  Total Cholesterol/HDL Ration Measurement: --  Triglycerides, Serum: 147

## 2022-05-13 NOTE — BH PSYCHOLOGY - GROUP THERAPY NOTE - NSBHPSYCHOLASSESSPROV_PSY_A_CORE
Licensed Psychologist
Licensed Psychologist and Psychology Trainee
Licensed Psychologist
Licensed Psychologist
Psychology Trainee only

## 2022-05-13 NOTE — BH PSYCHOLOGY - GROUP THERAPY NOTE - NSPSYCHOLGRPDBTINT_PSY_A_CORE
other...
review emotion regulation homework
other...
reviewed distress tolerance, skills homework
other...

## 2022-05-13 NOTE — BH PSYCHOLOGY - GROUP THERAPY NOTE - TOKEN PULL-DIAGNOSIS
Primary Diagnosis:  MDD (major depressive disorder) [F32.9]      Depression, major [F32.9]        Problem Dx:   
Primary Diagnosis:  Depression, major [F32.9]        Problem Dx:   
Primary Diagnosis:  MDD (major depressive disorder) [F32.9]      Depression, major [F32.9]        Problem Dx:   
Primary Diagnosis:  Depression, major [F32.9]        Problem Dx:   
Primary Diagnosis:  MDD (major depressive disorder) [F32.9]      Depression, major [F32.9]        Problem Dx:   
Primary Diagnosis:  Depression, major [F32.9]        Problem Dx:   
Primary Diagnosis:  MDD (major depressive disorder) [F32.9]      Depression, major [F32.9]        Problem Dx:   
Primary Diagnosis:  Depression, major [F32.9]        Problem Dx:   
Primary Diagnosis:  Depression, major [F32.9]        Problem Dx:

## 2022-05-13 NOTE — BH PSYCHOLOGY - GROUP THERAPY NOTE - NSBHPSYCHOLRESPONSE_PSY_A_CORE
Coping skills acquired/Insight displayed/Accepted support
Coping skills acquired/Accepted support
Coping skills acquired/Insight displayed/Accepted support
Coping skills acquired/Accepted support
Coping skills acquired/Accepted support
Coping skills acquired/Insight displayed/Accepted support

## 2022-05-13 NOTE — BH INPATIENT PSYCHIATRY DISCHARGE NOTE - NSDCCPCAREPLAN_GEN_ALL_CORE_FT
PRINCIPAL DISCHARGE DIAGNOSIS  Diagnosis: MDD (major depressive disorder)  Assessment and Plan of Treatment:       SECONDARY DISCHARGE DIAGNOSES  Diagnosis: Borderline personality disorder  Assessment and Plan of Treatment:

## 2022-05-13 NOTE — BH TREATMENT PLAN - NSTXCAREGIVERPARTICIPATE_PSY_P_CORE
Family/Caregiver participated in identification of needs/problems/goals for treatment
Family/Caregiver participated in identification of needs/problems/goals for treatment
minimum assist (75% patients effort)

## 2022-05-13 NOTE — BH TREATMENT PLAN - NSCMSPTSTRENGTHS_PSY_ALL_CORE
Able to adapt/Assertive/Compliance to treatment/Courageous/Expressive of emotions/Anamaria/spirituality/Flexibility/Future/goal oriented/Highly motivated for treatment/Intact family/Intelligence/Positive attitude/Resourceful/Supportive family
Able to adapt/Assertive/Compliance to treatment/Courageous/Expressive of emotions/Anamaria/spirituality/Flexibility/Future/goal oriented/Highly motivated for treatment/Intact family/Intelligence/Positive attitude/Resourceful/Supportive family

## 2022-05-13 NOTE — BH INPATIENT PSYCHIATRY DISCHARGE NOTE - HPI (INCLUDE ILLNESS QUALITY, SEVERITY, DURATION, TIMING, CONTEXT, MODIFYING FACTORS, ASSOCIATED SIGNS AND SYMPTOMS)
21-year-old female, single, non-caregiver residing with her parents, currently taking a leave of absence from [employer], with PPhx anxiety, panic and depression with long hx of outpatient treatment (currently in treatment with NP Jose Mccann), no history of violence, no legal involvement, no self-injurious behaviors, no suicide attempts, 2 prior psychiatric hospitalization recently at [facility] 1S from 2/28-3/9/22 and 3/31-4/12 no access to guns/weapons. Pt had presented to [facility] ED BIB father on recommendation from therapist in partial hospitalization program due to progressive depressive and anxious symptoms with SI and reported SA via alcohol and pills on 4/16 and was re-admitted 1 week later to [facility] on 4/18 for med management and stabilization. Shortly after, pt tested + for COVID and was transferred to [facility] for medical admission on 4/22 and followed by Psych [facility] for suicidality and depression, continued on meds, medically cleared, transferred to   [facility] on 9.13

## 2022-05-13 NOTE — BH PSYCHOLOGY - GROUP THERAPY NOTE - NSBHPSYCHOLGRPNAME_PSY_A_CORE
Developing Social Supports
Developing Social Supports
DBT Homework
DBT Skills
DBT Skills
DBT Homework
Developing Social Supports
DBT Skills
Developing Social Supports
DBT Skills
DBT Skills

## 2022-05-13 NOTE — BH INPATIENT PSYCHIATRY DISCHARGE NOTE - OTHER PAST PSYCHIATRIC HISTORY (INCLUDE DETAILS REGARDING ONSET, COURSE OF ILLNESS, INPATIENT/OUTPATIENT TREATMENT)
1 prior hospitalization at  at [facility] 2/28-3/9, outpatient care under Dr. Morataya for past 3 years, therapist Aida Martines

## 2022-05-13 NOTE — BH TREATMENT PLAN - NSTXCOPEINTERRN_PSY_ALL_CORE
Monitor mood and behavior. Assist in developing more effective coping skills.
Pt visible on the unit, socializing with peers and watching movie. Pt compliant with HS medication. No complaints of pain or discomfort. Denies AVH. No SiB noted. Pt slept well after 11pm. Safety maintained.

## 2022-05-13 NOTE — BH TREATMENT PLAN - NSTXDEPRESINTERRN_PSY_ALL_CORE
Monitor mood and behavior. Observe for increased symptoms of depression. Provide safe and supportive environment. Encourage patient to verbalize feelings and concerns.
Monitor mood and behavior. Observe for increased symptoms of depression. Provide safe and supportive environment. Encourage patient to verbalize feelings and concerns.

## 2022-05-13 NOTE — BH PSYCHOLOGY - GROUP THERAPY NOTE - NSBHPSYCHOLGRPTYPE_PSY_A_CORE
DBT Life Skills
General Group Therapy
DBT Life Skills
DBT Life Skills
General Group Therapy
DBT Life Skills
General Group Therapy
General Group Therapy
DBT Life Skills

## 2022-05-13 NOTE — BH PSYCHOLOGY - GROUP THERAPY NOTE - NSPSYCHOLGRPDBTEMOT_PSY_A_CORE FT
accumulating positive emotions in the long term
na
taught Emotion Regulation skill of Opposite Action
na
PLEASE 
na
Build Mastery / [place] Ahead

## 2022-05-13 NOTE — BH PSYCHOLOGY - GROUP THERAPY NOTE - NSBHPSYCHOLPARTICIP_PSY_A_CORE
Fully engaged
Partially engaged
Fully engaged

## 2022-05-13 NOTE — BH PSYCHOLOGY - GROUP THERAPY NOTE - NSPSYCHOLGRPDBTPT_PSY_A_CORE FT
DBT Group is a group in which patients learn skills to better manage their emotions and behaviors. Group begins with a mindfulness practice so that patients have an opportunity to practice observing their internal and external experiences. Following the mindfulness exercise the group learns new skills in a didactic format. Group today focused on the “distress tolerance” module, which are skills to help patients manage their crisis urges. Specifically, patients learned “TIPP” skills, which are skills to use when patients are highly distressed (at least 70/100) and are unable to think effectively to use other skills. These skills involve “Tipping” body chemistry through using temperature, intense exercise, paced breathing and progressive muscle relaxation. Each skill was reviewed and patients practiced progressive muscle relaxation and paced breathing in the session. Patients were encouraged to practice these skills while on the unit.
DBT skills generalization group is a group in which patients review the skill taught the day before, and patients have the opportunity to troubleshoot the skill, engage in more practice, and share their experience using the skill. Today’s skills review group focused on the skills of “radical acceptance” and "willingness" which include accepting painful situations in their lives they cannot change through turning the mind and practicing engaging in reality effectively. Patients were asked to determine difficult situations in their lives they needed to work on accepting, and provided examples of ways to practice this while in the hospital.  
DBT Group is a group in which patients learn skills to better manage their emotions and behaviors. Group begins with a mindfulness practice so that patients have an opportunity to practice observing their internal and external experiences.  Following the mindfulness exercise the group learns new skills in a didactic format. Group today focused on the “distress tolerance” module, which are skills to help patients manage their crisis urges.  Specifically, pts learned the skill of “radical acceptance,” which includes accepting painful situations in their lives they cannot change through turning the mind and practicing willingness. Patients were asked to determine difficult situations in their lives they needed to work on accepting, and provided examples of ways to practice this while in the hospital. 
DBT Group is a group in which patients learn skills to better manage their emotions and behaviors. Group begins with a mindfulness practice so that patients have an opportunity to practice observing their internal and external experiences.  Following the mindfulness exercise the group learns new skills in a didactic format. Group today focused on the “emotion regulation” module.  Specifically, patients learned Build Mastery and [place] Ahead and [place] Ahead. Patients were asked to identify an activity to engage in every day that would help increase their sense of competence and accomplishment (Build Mastery). They were also asked to identify potential difficult situations, identify skills to help manage these difficulties, and imagine coping effectively ([place] Ahead).
DBT Group is a group in which patients learn skills to better manage their emotions and behaviors. Group begins with a mindfulness practice so that patients have an opportunity to practice observing their internal and external experiences.  Following the mindfulness exercise the group learns new skills in a didactic format. Group today focused on the “emotion regulation” module.  Specifically, patients learned the skills of “check the facts,” and “opposite action.” “Check the facts” is about being more realistic about interpretations and assumptions and challenging them appropriately to think more rationally, and “opposite action” involves acting opposite to problematic urges that come with emotions.  provided an example of checking the facts, and patients were encouraged to think about how these skills, and were assigned homework to practice.  
DBT skills generalization group is a group in which patients review the skill taught the day before, and patients have the opportunity to troubleshoot the skill, engage in more practice, and share their experience using the skill. Today’s skills review group focused on the skill of Accumulating Positive Emotions in the Long Term by identifying values and translating those into goals and manageable action steps. Patients shared values that are important to them and how these translate into goals, as well as how they’ve begun to implement these.  
DBT Group is a group in which patients learn skills to better manage their emotions and behaviors. Group begins with a mindfulness practice so that patients have an opportunity to practice observing their internal and external experiences.  Following the mindfulness exercise the group learns new skills in a didactic format. Group today focused on the “emotion regulation” module.  Specifically, patients learned the skills of “PLEASE,” or taking care of the mind by taking care of the body. This skill involves maintaining consistent treatment for physical illness, balanced eating, avoidance of mood-altering substances, balanced sleep, and exercise.  provided examples and suggestions of ways to improve these areas, and patients were encouraged to engage in discussion of ways they can prioritize and implement this skill.

## 2022-05-13 NOTE — BH INPATIENT PSYCHIATRY DISCHARGE NOTE - HOSPITAL COURSE
Patient is a 21-year-old female with a history of major depressive disorder, FRANSISCO, and cluster B traits who initially presented for a suicide attempt by overdose on 4-6 ounces of alcohol and half a milligram of Klonopin while attending a partial hospitalization program. The patient has been hospitalized twice this year. The patient has had numerous failed medication trials. The normal course of her illness can be characterized by rapid decompensation, but also rapid improvement after hospitalization. Due to experiencing upper respiratory symptoms, she was tested for COVID-19, and has been quarantined at [facility] for the past nine days. Since her return, her mood has improved, though she expresses continued difficulty with sleep and increased appetite. Given her recent suicidality, she is a danger to herself and meets criteria for inpatient psychiatric hospitalization.  PsyHx: Sees Dr. Morataya x 3 years. Therapist Aida Martines 1x hospitalization 1 month ago at [facility].  SH: [employer] [facility]. Lives with parents.    Patient was started on home dose of duloxetine and increased to 120 mg daily. She was also started on prazosin to address chronic nightmares, which she found to be very effective. Risks, benefits, and side effects of all medication prescribed were discussed in detail, including the FDA-issued black box warning relaying the increased risk of suicidality for antidepressants in patients under the age of 24.    Over the course of hospitalization, the patient's mood was stable and euthymic after returning from her quarantine period at [facility]. She reported experiencing poor sleep 2/2 nightmares, which improved significantly after starting prazosin. She identified with the features of borderline personality disorder as well as features of OCD, though she did not meet criteria for this diagnosis. She identified struggling with interpersonal effectiveness, due to having difficulty setting boundaries due to fears of disappointing others. We conducted a family meeting with her parents on 5/13, where we discussed ways parents could support the patient. On the day of discharge, the patient again identified feeling anxious about being suicidal again, but ultimately she agreed with the plan for discharge and participation in PHP.     No medical issues, restraints, or self-injurious behavior noted during the hospitalization.    At the time of discharge, the patient reported improved mood, exhibited a euthymic affect, and denied SI/HI/AVH or desire to self-harm. The patient was future-oriented with respect to her future plans. The patient denied any intolerable side effects and agreed with the plan for discharge due to the patient’s confidence that treatment could be successfully continued as an outpatient.    Risk assessment:  On admission, acute risk factors included: Suicidal behavior, suicidal ideation, major depressive episode  Chronic risk factors included: Diagnosis of MDD, past psychiatric hospitalizations, history of suicide attempt, history of suicidal ideation    Acute risk factors were mitigated during hospitalization and overall risk had returned to baseline levels by the time of discharge. However, the patient remains at elevated risk of suicide given chronic risk factors, which would not be reduced further by continued hospitalization and are best managed on an outpatient basis. In addition, the patient has numerous protective factors, including treatment adherence, close involvement of family throughout hospitalization, limited access to lethal means (reported by family and patient), social support. The patient was able to engage in safety planning, and neither the patient nor family identified any barriers to discharge.   Therefore, the patient no longer met criteria for inpatient psychiatric hospitalization and was discharged from the 1S unit.     DSM-5 diagnosis:  Major depressive disorder  R/o borderline personality disorder    Discharge medication:  - Duloxetine 120 mg daily (depression)  - Prazosin 4 mg QHS (nightmares)

## 2022-05-13 NOTE — BH TREATMENT PLAN - NSTXPLANTHERAPYSESSIONSFT_PSY_ALL_CORE
05-11-22  Type of therapy: Cognitive behavior therapy,Dialectical behavior therapy,Coping skills,Creative arts therapy,Mindfulness,Music therapy,Pet therapy  Type of session: Individual  Level of patient participation: Engaged  Duration of participation: 30 minutes  Therapy conducted by: Psych rehab  Therapy Summary: Patient has demonstrated some progress towards psychiatric rehabilitation goal of identifying and utilizing coping skills to manage symptoms. Patient was able to identify some coping skills with encouragement from writer. Psychiatric rehabilitation recommends patient continue to attend groups, engage in individual sessions, and participate in activities in the milieu to continue exploring coping skills to manage symptoms.  Writer met with patient for an individual session in order to review progress towards psychiatric rehabilitation goals. Pt stated she is getting better and her mood is better. Pt is still anxious but is abating; pt attributed this to her obsessive thoughts about hurting others interpersonally due to her hospitalizations and not maintaining her gains. Pt is using EXRP to help with exposure, congivtive restructuring, reframing, cognitve defusion, radical accpetance and opposite action. Pt is attending group therapy and is jaded after attending these groups repeatedly. Mood is good and affect is brighter. Pt denied SI/HI.    Pt is using resting, solitude, word search Sudoku,  journaling, model magic, socialization, mindfulness, positive distractions skills, music, Art therapy, pet therapy as coping skills. Pt denied SI/HI/AH/VH. Pt is adherent with medication and is attending group therapy and is engaged with her homework/generalization skills.  Due to the COVID-19 pandemic, unit structure and activities are re-evaluated on a consistent basis in effort to maintain the safety of patients and staff. Patient is visible on the unit an interacts selectively with peers.

## 2022-05-13 NOTE — BH TREATMENT PLAN - NSPTSTATEDGOAL_PSY_ALL_CORE
I want to continue to work on my anxiety/depression as well as establishing better sleep.
I want to continue to work on my anxiety/depression as well as establishing better sleep.

## 2022-05-13 NOTE — BH PSYCHOLOGY - GROUP THERAPY NOTE - NSPSYCHOLGRPDBTINT_PSY_A_CORE FT
taught emotion regulation skill 
taught distress tolerance skill 
taught emotion regulation skill 
taught Emotion Regulation skill of Opposite Action
taught distress tolerance skill

## 2022-05-14 RX ADMIN — LORATADINE 10 MILLIGRAM(S): 10 TABLET ORAL at 08:41

## 2022-05-14 RX ADMIN — ATORVASTATIN CALCIUM 10 MILLIGRAM(S): 80 TABLET, FILM COATED ORAL at 20:38

## 2022-05-14 RX ADMIN — Medication 4 MILLIGRAM(S): at 20:38

## 2022-05-14 RX ADMIN — Medication 1 TABLET(S): at 11:22

## 2022-05-14 RX ADMIN — DULOXETINE HYDROCHLORIDE 120 MILLIGRAM(S): 30 CAPSULE, DELAYED RELEASE ORAL at 08:41

## 2022-05-14 RX ADMIN — POLYETHYLENE GLYCOL 3350 17 GRAM(S): 17 POWDER, FOR SOLUTION ORAL at 08:43

## 2022-05-15 RX ADMIN — LORATADINE 10 MILLIGRAM(S): 10 TABLET ORAL at 08:27

## 2022-05-15 RX ADMIN — POLYETHYLENE GLYCOL 3350 17 GRAM(S): 17 POWDER, FOR SOLUTION ORAL at 08:27

## 2022-05-15 RX ADMIN — DULOXETINE HYDROCHLORIDE 120 MILLIGRAM(S): 30 CAPSULE, DELAYED RELEASE ORAL at 08:27

## 2022-05-15 RX ADMIN — Medication 4 MILLIGRAM(S): at 20:49

## 2022-05-15 RX ADMIN — ATORVASTATIN CALCIUM 10 MILLIGRAM(S): 80 TABLET, FILM COATED ORAL at 20:49

## 2022-05-16 VITALS — TEMPERATURE: 98 F | HEART RATE: 136 BPM | DIASTOLIC BLOOD PRESSURE: 76 MMHG | SYSTOLIC BLOOD PRESSURE: 110 MMHG

## 2022-05-16 PROCEDURE — 90834 PSYTX W PT 45 MINUTES: CPT

## 2022-05-16 PROCEDURE — 99231 SBSQ HOSP IP/OBS SF/LOW 25: CPT

## 2022-05-16 RX ADMIN — LORATADINE 10 MILLIGRAM(S): 10 TABLET ORAL at 08:38

## 2022-05-16 RX ADMIN — DULOXETINE HYDROCHLORIDE 120 MILLIGRAM(S): 30 CAPSULE, DELAYED RELEASE ORAL at 08:38

## 2022-05-16 NOTE — BH INPATIENT PSYCHIATRY PROGRESS NOTE - NSTXDCFAMGOAL_PSY_ALL_CORE
Will agree to involve supports/family in treatment and discharge planning

## 2022-05-16 NOTE — BH INPATIENT PSYCHIATRY PROGRESS NOTE - NSTXDEPRESINTERMD_PSY_ALL_CORE
Antidepressant  DBT Skills  Psychoeducation

## 2022-05-16 NOTE — BH INPATIENT PSYCHIATRY PROGRESS NOTE - NSTXSUICIDDATEEST_PSY_ALL_CORE
18-Apr-2022

## 2022-05-16 NOTE — BH PSYCHOLOGY - CLINICIAN PSYCHOTHERAPY NOTE - NSBHPSYCHOLNARRATIVE_PSY_A_CORE FT
Patient was alert, cooperative, and in control. Oriented x3. Casually dressed, fairly groomed. Maintained good eye contact. Speech normal in production, rate, volume, and tone. No abnormal psychomotor behavior. Mood "anxious" with full range of affect. Thought process logical, goal-directed. Thought content relevant to discussion. Denied auditory/visual hallucinations and suicidal/homicidal ideation, intent, plan, and urges to self-harm. Committed to remaining safe on the unit and informing staff if unable to manage behaviors. Impulse control intact. Insight and judgment fair.     Patient reported continued anxiety regarding her experience of anxiety (thoughts, physical sensations, situations, people, locations), denied suicidal ideation. Patient was future oriented, discussed goals and values, reasons for living, and what suicide attempt has cost her in moving forward in life. Session focused on psychoeducation regarding anxiety, avoidance, escape, and reinforcement. Also discussed how overuse of skills can also be avoidance of the experience of anxiety. Patient expressed willingness to engage in treatment, stated that she is no longer willful as she spent time in the medical unit and has reflected on her life. Patient agreed to engage in activities on the unit that would cause a reasonable level of anxiety. Encouraged patient to seek support from staff if anxiety becomes too intense.
Patient was alert, cooperative, and in control. Oriented x3. Casually dressed, fairly groomed. Maintained good eye contact. Speech normal in production, rate, volume, and tone. No abnormal psychomotor behavior. Mood "anxious" with tearful affect. Thought process logical, goal-directed. Thought content relevant to discussion. Expressed suicidal ideation ("I don't want to do this anymore if I feel this way"), denied intent, plan, and self-harm urges. Denied auditory/visual hallucinations and homicidal ideation, intent, and plan. Committed to remaining safe on the unit and informing staff if unable to manage behaviors. Impulse control intact. Insight and judgment fair.    Patient reported anxiety about discharge, not believing she will be able to tolerate her emotions, seeking reassurance that she will be "better," expressed suicidal ideation without plan or intent if anxiety was to return at high intensity. However, patient was able to identify reasons for living, wanting to avoid pain and hurting others, and potential long term physical difficulties. She expressed wanting relief from her anxiety and not necessarily wanting to die. Patient expressed commitment to safety, not attempting to kill herself, following her safety plan, and seeking support as needed. Discussed with treatment team.
Patient was alert, cooperative, and in control. Oriented x3. Casually dressed, fairly groomed. Maintained good eye contact. Speech normal in production, rate, volume, and tone. No abnormal psychomotor behavior. Mood "anxious" with full range of affect. Thought process logical, goal-directed. Thought content relevant to discussion. Denied auditory/visual hallucinations and suicidal/homicidal ideation, intent, plan, and urges to self-harm. Committed to remaining safe on the unit and informing staff if unable to manage behaviors. Impulse control intact. Insight and judgment fair.    Patient overall believes that she has benefited from hospitalization and incorporating exposure therapy in addition to reinforcing DBT skills. Patient expressed appropriate discharge related anxiety about returning home, symptoms returning, and having to return to the hospital. Patient was able to discuss potential consequences of avoiding emotional experience, reassurance seeking, and also compulsive reassuring herself. She was also able to identify moments to use opposite action and other DBT skills vs when to tolerate her emotions. Session also focused on safety planning. Patient was able to identify appropriate items for each section. Please see Patient Safety Plan for further details.

## 2022-05-16 NOTE — BH INPATIENT PSYCHIATRY PROGRESS NOTE - NSTXCOPEDATEEST_PSY_ALL_CORE
04-May-2022

## 2022-05-16 NOTE — BH INPATIENT PSYCHIATRY PROGRESS NOTE - NSBHCHARTREVIEWVS_PSY_A_CORE FT
Vital Signs Last 24 Hrs  T(C): 36.9 (05-12-22 @ 07:35), Max: 37.1 (05-11-22 @ 20:47)  T(F): 98.4 (05-12-22 @ 07:35), Max: 98.7 (05-11-22 @ 20:47)  HR: 110 (05-12-22 @ 07:35) (110 - 110)  BP: 110/67 (05-12-22 @ 07:35) (110/67 - 110/67)  BP(mean): --  RR: --  SpO2: --    Orthostatic VS  05-11-22 @ 20:47  Lying BP: --/-- HR: --  Sitting BP: 112/66 HR: 100  Standing BP: --/-- HR: --  Site: --  Mode: --  
Vital Signs Last 24 Hrs  T(C): 36.5 (05-13-22 @ 07:58), Max: 36.7 (05-12-22 @ 20:44)  T(F): 97.7 (05-13-22 @ 07:58), Max: 98.1 (05-12-22 @ 20:44)  HR: 112 (05-13-22 @ 07:58) (112 - 112)  BP: 127/70 (05-13-22 @ 07:58) (113/79 - 127/70)  BP(mean): 101 (05-12-22 @ 20:44) (101 - 101)  RR: --  SpO2: --    Orthostatic VS  05-11-22 @ 20:47  Lying BP: --/-- HR: --  Sitting BP: 112/66 HR: 100  Standing BP: --/-- HR: --  Site: --  Mode: --  
Vital Signs Last 24 Hrs  T(C): 30 (05-07-22 @ 06:53), Max: 36.4 (05-06-22 @ 21:11)  T(F): 86 (05-07-22 @ 06:53), Max: 97.5 (05-06-22 @ 21:11)  HR: 86 (05-07-22 @ 06:53) (86 - 86)  BP: 125/77 (05-07-22 @ 06:53) (125/77 - 125/77)  BP(mean): --  RR: --  SpO2: --    Orthostatic VS  05-05-22 @ 21:07  Lying BP: --/-- HR: --  Sitting BP: 120/78 HR: 100  Standing BP: --/-- HR: --  Site: --  Mode: --  
Vital Signs Last 24 Hrs  T(C): 36.7 (05-09-22 @ 21:03), Max: 36.7 (05-09-22 @ 21:03)  T(F): 98 (05-09-22 @ 21:03), Max: 98 (05-09-22 @ 21:03)  HR: --  BP: --  BP(mean): --  RR: --  SpO2: --    
Vital Signs Last 24 Hrs  T(C): 36.9 (05-16-22 @ 08:49), Max: 36.9 (05-16-22 @ 08:49)  T(F): 98.4 (05-16-22 @ 08:49), Max: 98.4 (05-16-22 @ 08:49)  HR: 136 (05-16-22 @ 08:49) (136 - 136)  BP: 110/76 (05-16-22 @ 08:49) (110/76 - 110/76)  BP(mean): --  RR: --  SpO2: --    Orthostatic VS  05-15-22 @ 20:55  Lying BP: --/-- HR: --  Sitting BP: 106/63 HR: 86  Standing BP: --/-- HR: --  Site: --  Mode: --  
Vital Signs Last 24 Hrs  T(C): 36.6 (05-04-22 @ 08:32), Max: 36.6 (05-04-22 @ 08:32)  T(F): 97.9 (05-04-22 @ 08:32), Max: 97.9 (05-04-22 @ 08:32)  HR: --  BP: --  BP(mean): --  RR: 18 (05-03-22 @ 17:30) (18 - 18)  SpO2: 100% (05-03-22 @ 17:30) (100% - 100%)    Orthostatic VS  05-04-22 @ 08:32  Lying BP: --/-- HR: --  Sitting BP: 113/67 HR: 115  Standing BP: 108/80 HR: 100  Site: --  Mode: --  Orthostatic VS  05-03-22 @ 17:30  Lying BP: --/-- HR: --  Sitting BP: 109/74 HR: 108  Standing BP: 122/64 HR: 110  Site: --  Mode: --  
Vital Signs Last 24 Hrs  T(C): 36.8 (05-06-22 @ 08:11), Max: 37.2 (05-05-22 @ 21:07)  T(F): 98.3 (05-06-22 @ 08:11), Max: 99 (05-05-22 @ 21:07)  HR: 116 (05-06-22 @ 08:18) (116 - 124)  BP: 106/62 (05-06-22 @ 08:11) (106/62 - 106/62)  BP(mean): --  RR: --  SpO2: --    Orthostatic VS  05-05-22 @ 21:07  Lying BP: --/-- HR: --  Sitting BP: 120/78 HR: 100  Standing BP: --/-- HR: --  Site: --  Mode: --  
Vital Signs Last 24 Hrs  T(C): 36.8 (05-11-22 @ 07:46), Max: 36.8 (05-11-22 @ 07:46)  T(F): 98.2 (05-11-22 @ 07:46), Max: 98.2 (05-11-22 @ 07:46)  HR: --  BP: 109/67 (05-11-22 @ 07:46) (109/67 - 109/67)  BP(mean): 111 (05-11-22 @ 07:46) (111 - 111)  RR: --  SpO2: --    
Vital Signs Last 24 Hrs  T(C): 36.1 (05-05-22 @ 06:25), Max: 36.2 (05-04-22 @ 21:00)  T(F): 97 (05-05-22 @ 06:25), Max: 97.2 (05-04-22 @ 21:00)  HR: 92 (05-05-22 @ 06:25) (92 - 92)  BP: 118/69 (05-05-22 @ 06:25) (118/69 - 118/69)  BP(mean): --  RR: --  SpO2: --    Orthostatic VS  05-04-22 @ 08:32  Lying BP: --/-- HR: --  Sitting BP: 113/67 HR: 115  Standing BP: 108/80 HR: 100  Site: --  Mode: --  Orthostatic VS  05-03-22 @ 17:30  Lying BP: --/-- HR: --  Sitting BP: 109/74 HR: 108  Standing BP: 122/64 HR: 110  Site: --  Mode: --  
Vital Signs Last 24 Hrs  T(C): 36.8 (05-09-22 @ 08:20), Max: 36.8 (05-09-22 @ 08:20)  T(F): 98.3 (05-09-22 @ 08:20), Max: 98.3 (05-09-22 @ 08:20)  HR: 112 (05-09-22 @ 08:20) (112 - 112)  BP: 122/57 (05-09-22 @ 08:20) (122/57 - 122/57)  BP(mean): --  RR: --  SpO2: --

## 2022-05-16 NOTE — BH INPATIENT PSYCHIATRY PROGRESS NOTE - NSTXSUICIDGOAL_PSY_ALL_CORE
Talk to staff and ask for assistance when having suicidal wishes instead of acting out

## 2022-05-16 NOTE — BH INPATIENT PSYCHIATRY PROGRESS NOTE - NSTXANXDATETRGT_PSY_ALL_CORE
06-Apr-2022

## 2022-05-16 NOTE — BH INPATIENT PSYCHIATRY PROGRESS NOTE - CURRENT MEDICATION
MEDICATIONS  (STANDING):  atorvastatin 10 milliGRAM(s) Oral at bedtime  DULoxetine 120 milliGRAM(s) Oral daily  loratadine 10 milliGRAM(s) Oral daily  polyethylene glycol 3350 17 Gram(s) Oral daily  prazosin. 4 milliGRAM(s) Oral at bedtime  Vestura 3-0.02mg 1 Tablet(s) 1 Tablet(s) Oral at bedtime    MEDICATIONS  (PRN):  acetaminophen     Tablet .. 650 milliGRAM(s) Oral every 6 hours PRN Mild Pain (1 - 3)  calcium carbonate    500 mG (Tums) Chewable 1 Tablet(s) Chew three times a day PRN GERD  hydrOXYzine hydrochloride 25 milliGRAM(s) Oral every 6 hours PRN Anxiety  LORazepam     Tablet 1 milliGRAM(s) Oral every 6 hours PRN Agitation  LORazepam   Injectable 1 milliGRAM(s) IntraMuscular once PRN Severe Agitation  
MEDICATIONS  (STANDING):  atorvastatin 10 milliGRAM(s) Oral at bedtime  DULoxetine 90 milliGRAM(s) Oral daily  loratadine 10 milliGRAM(s) Oral daily  prazosin. 4 milliGRAM(s) Oral at bedtime  Vestura 3-0.02mg 1 Tablet(s) 1 Tablet(s) Oral at bedtime    MEDICATIONS  (PRN):  hydrOXYzine hydrochloride 25 milliGRAM(s) Oral every 6 hours PRN Anxiety  LORazepam     Tablet 1 milliGRAM(s) Oral every 6 hours PRN Agitation  LORazepam   Injectable 1 milliGRAM(s) IntraMuscular once PRN Severe Agitation  
MEDICATIONS  (STANDING):  atorvastatin 10 milliGRAM(s) Oral at bedtime  DULoxetine 90 milliGRAM(s) Oral daily  loratadine 10 milliGRAM(s) Oral daily  prazosin. 2 milliGRAM(s) Oral at bedtime  Vestura 3-0.02mg 1 Tablet(s) 1 Tablet(s) Oral at bedtime    MEDICATIONS  (PRN):  hydrOXYzine hydrochloride 25 milliGRAM(s) Oral every 6 hours PRN Anxiety  LORazepam     Tablet 1 milliGRAM(s) Oral every 6 hours PRN Agitation  LORazepam   Injectable 1 milliGRAM(s) IntraMuscular once PRN Severe Agitation  
MEDICATIONS  (STANDING):  atorvastatin 10 milliGRAM(s) Oral at bedtime  DULoxetine 120 milliGRAM(s) Oral daily  loratadine 10 milliGRAM(s) Oral daily  prazosin. 4 milliGRAM(s) Oral at bedtime  Vestura 3-0.02mg 1 Tablet(s) 1 Tablet(s) Oral at bedtime    MEDICATIONS  (PRN):  calcium carbonate    500 mG (Tums) Chewable 1 Tablet(s) Chew three times a day PRN GERD  hydrOXYzine hydrochloride 25 milliGRAM(s) Oral every 6 hours PRN Anxiety  LORazepam     Tablet 1 milliGRAM(s) Oral every 6 hours PRN Agitation  LORazepam   Injectable 1 milliGRAM(s) IntraMuscular once PRN Severe Agitation  
MEDICATIONS  (STANDING):  atorvastatin 10 milliGRAM(s) Oral at bedtime  DULoxetine 120 milliGRAM(s) Oral daily  loratadine 10 milliGRAM(s) Oral daily  prazosin. 4 milliGRAM(s) Oral at bedtime  Vestura 3-0.02mg 1 Tablet(s) 1 Tablet(s) Oral at bedtime    MEDICATIONS  (PRN):  hydrOXYzine hydrochloride 25 milliGRAM(s) Oral every 6 hours PRN Anxiety  LORazepam     Tablet 1 milliGRAM(s) Oral every 6 hours PRN Agitation  LORazepam   Injectable 1 milliGRAM(s) IntraMuscular once PRN Severe Agitation  
MEDICATIONS  (STANDING):  atorvastatin 10 milliGRAM(s) Oral at bedtime  DULoxetine 120 milliGRAM(s) Oral daily  loratadine 10 milliGRAM(s) Oral daily  polyethylene glycol 3350 17 Gram(s) Oral daily  prazosin. 4 milliGRAM(s) Oral at bedtime  Vestura 3-0.02mg 1 Tablet(s) 1 Tablet(s) Oral at bedtime    MEDICATIONS  (PRN):  acetaminophen     Tablet .. 650 milliGRAM(s) Oral every 6 hours PRN Mild Pain (1 - 3)  calcium carbonate    500 mG (Tums) Chewable 1 Tablet(s) Chew three times a day PRN GERD  hydrOXYzine hydrochloride 25 milliGRAM(s) Oral every 6 hours PRN Anxiety  LORazepam     Tablet 1 milliGRAM(s) Oral every 6 hours PRN Agitation  LORazepam   Injectable 1 milliGRAM(s) IntraMuscular once PRN Severe Agitation  
MEDICATIONS  (STANDING):  atorvastatin 10 milliGRAM(s) Oral at bedtime  DULoxetine 120 milliGRAM(s) Oral daily  loratadine 10 milliGRAM(s) Oral daily  polyethylene glycol 3350 17 Gram(s) Oral daily  prazosin. 4 milliGRAM(s) Oral at bedtime  Vestura 3-0.02mg 1 Tablet(s) 1 Tablet(s) Oral at bedtime    MEDICATIONS  (PRN):  calcium carbonate    500 mG (Tums) Chewable 1 Tablet(s) Chew three times a day PRN GERD  hydrOXYzine hydrochloride 25 milliGRAM(s) Oral every 6 hours PRN Anxiety  LORazepam     Tablet 1 milliGRAM(s) Oral every 6 hours PRN Agitation  LORazepam   Injectable 1 milliGRAM(s) IntraMuscular once PRN Severe Agitation  
MEDICATIONS  (STANDING):  atorvastatin 10 milliGRAM(s) Oral at bedtime  DULoxetine 120 milliGRAM(s) Oral daily  loratadine 10 milliGRAM(s) Oral daily  prazosin. 4 milliGRAM(s) Oral at bedtime  Vestura 3-0.02mg 1 Tablet(s) 1 Tablet(s) Oral at bedtime    MEDICATIONS  (PRN):  calcium carbonate    500 mG (Tums) Chewable 1 Tablet(s) Chew three times a day PRN GERD  hydrOXYzine hydrochloride 25 milliGRAM(s) Oral every 6 hours PRN Anxiety  LORazepam     Tablet 1 milliGRAM(s) Oral every 6 hours PRN Agitation  LORazepam   Injectable 1 milliGRAM(s) IntraMuscular once PRN Severe Agitation  
MEDICATIONS  (STANDING):  atorvastatin 10 milliGRAM(s) Oral at bedtime  DULoxetine 120 milliGRAM(s) Oral daily  loratadine 10 milliGRAM(s) Oral daily  polyethylene glycol 3350 17 Gram(s) Oral daily  prazosin. 4 milliGRAM(s) Oral at bedtime  Vestura 3-0.02mg 1 Tablet(s) 1 Tablet(s) Oral at bedtime    MEDICATIONS  (PRN):  acetaminophen     Tablet .. 650 milliGRAM(s) Oral every 6 hours PRN Mild Pain (1 - 3)  calcium carbonate    500 mG (Tums) Chewable 1 Tablet(s) Chew three times a day PRN GERD  hydrOXYzine hydrochloride 25 milliGRAM(s) Oral every 6 hours PRN Anxiety  LORazepam     Tablet 1 milliGRAM(s) Oral every 6 hours PRN Agitation  LORazepam   Injectable 1 milliGRAM(s) IntraMuscular once PRN Severe Agitation  
MEDICATIONS  (STANDING):  atorvastatin 10 milliGRAM(s) Oral at bedtime  loratadine 10 milliGRAM(s) Oral daily  prazosin. 4 milliGRAM(s) Oral at bedtime  Vestura 3-0.02mg 1 Tablet(s) 1 Tablet(s) Oral at bedtime    MEDICATIONS  (PRN):  hydrOXYzine hydrochloride 25 milliGRAM(s) Oral every 6 hours PRN Anxiety  LORazepam     Tablet 1 milliGRAM(s) Oral every 6 hours PRN Agitation  LORazepam   Injectable 1 milliGRAM(s) IntraMuscular once PRN Severe Agitation

## 2022-05-16 NOTE — BH INPATIENT PSYCHIATRY PROGRESS NOTE - NSTXCOPEPROGRES_PSY_ALL_CORE
No Change
Improving
No Change
Met - goal discontinued
Improving
No Change

## 2022-05-16 NOTE — BH INPATIENT PSYCHIATRY PROGRESS NOTE - NSCGISEVERILLNESS_PSY_ALL_CORE
4 = Moderately ill – overt symptoms causing noticeable, but modest, functional impairment or distress; symptom level may warrant medication
4 = Moderately ill – overt symptoms causing noticeable, but modest, functional impairment or distress; symptom level may warrant medication
3 = Mildly ill – clearly established symptoms with minimal, if any, distress or difficulty in social and occupational function
4 = Moderately ill – overt symptoms causing noticeable, but modest, functional impairment or distress; symptom level may warrant medication
4 = Moderately ill – overt symptoms causing noticeable, but modest, functional impairment or distress; symptom level may warrant medication
3 = Mildly ill – clearly established symptoms with minimal, if any, distress or difficulty in social and occupational function
4 = Moderately ill – overt symptoms causing noticeable, but modest, functional impairment or distress; symptom level may warrant medication
4 = Moderately ill – overt symptoms causing noticeable, but modest, functional impairment or distress; symptom level may warrant medication
3 = Mildly ill – clearly established symptoms with minimal, if any, distress or difficulty in social and occupational function
4 = Moderately ill – overt symptoms causing noticeable, but modest, functional impairment or distress; symptom level may warrant medication

## 2022-05-16 NOTE — BH INPATIENT PSYCHIATRY PROGRESS NOTE - NSBHASSESSSUMMFT_PSY_ALL_CORE
Patient is a 21-year-old female with a history of major depressive disorder, FRANSISCO, and cluster B traits who initially presented for a suicide attempt by overdose on 4-6 ounces of alcohol and half a milligram of Klonopin while attending a partial hospitalization program. The patient has been hospitalized twice this year. The patient has had numerous failed medication trials. The normal course of her illness can be characterized by rapid decompensation, but also rapid improvement after hospitalization. Due to experiencing upper respiratory symptoms, she was tested for COVID-19, and has been quarantined at [facility] for the past nine days. Since her return, her mood has improved, though she expresses continued difficulty with sleep and increased appetite. Given her recent suicidality, she is a danger to herself and meets criteria for inpatient psychiatric hospitalization.  PsyHx: Sees Dr. Morataya x 3 years. Therapist Aida Martines 1x hospitalization 1 month ago at [facility].  [facility] [employer]. Lives with parents.    Week of 5/9: Patient reports significantly improved sleep with prazosin, as she was experiencing nightmares. Depression is improving also. Will discuss treatment plan in family meeting on Friday, with plan for patient to start PHP on Tuesday.    1. Admission status  9.13 (Voluntary)    2. Significant lab findings  - COVID-19 positive (4/22, 4/29, 5/2)    3. Psychotropic medications  - Duloxetine 120 mg daily (depression)  	- Home medication, inc 5/7  - Prazosin 4 mg QHS (nightmares)  	- Start 5/4, inc 5/5  D/c mirtazapine - poor efficacy    4. Medical conditions, other medications, consults  None    5. Psychosocial interventions  - Family contacted: Contacted father 5/11  
Patient is a 21-year-old female with a history of major depressive disorder, FRANSISCO, and cluster B traits who initially presented for a suicide attempt by overdose on 4-6 ounces of alcohol and half a milligram of Klonopin while attending a partial hospitalization program. The patient has been hospitalized twice this year. The patient has had numerous failed medication trials. The normal course of her illness can be characterized by rapid decompensation, but also rapid improvement after hospitalization. Due to experiencing upper respiratory symptoms, she was tested for COVID-19, and has been quarantined at [facility] for the past nine days. Since her return, her mood has improved, though she expresses continued difficulty with sleep and increased appetite. Given her recent suicidality, she is a danger to herself and meets criteria for inpatient psychiatric hospitalization.  PsyHx: Sees Dr. Morataya x 3 years. Therapist Aida Martines 1x hospitalization 1 month ago at [facility].  SH: Undergrad at [employer]. Lives with parents.    1. Admission status  9.13 (Voluntary)    2. Significant lab findings  - COVID-19 positive (4/22, 4/29, 5/2)    3. Psychotropic medications  - Duloxetine 90 mg daily (depression)  	- Home medication  - Prazosin 4 mg QHS (nightmares)  	- Start 5/4, inc 5/5  D/c mirtazapine - poor efficacy    4. Medical conditions, other medications, consults  None    5. Psychosocial interventions  - Family contacted: DIOGO  
Patient is a 21-year-old female with a history of major depressive disorder, FRANSISCO, and cluster B traits who initially presented for a suicide attempt by overdose on 4-6 ounces of alcohol and half a milligram of Klonopin while attending a partial hospitalization program. The patient has been hospitalized twice this year. The patient has had numerous failed medication trials. The normal course of her illness can be characterized by rapid decompensation, but also rapid improvement after hospitalization. Due to experiencing upper respiratory symptoms, she was tested for COVID-19, and has been quarantined at [facility] for the past nine days. Since her return, her mood has improved, though she expresses continued difficulty with sleep and increased appetite. Given her recent suicidality, she is a danger to herself and meets criteria for inpatient psychiatric hospitalization.  PsyHx: Sees Dr. Morataya x 3 years. Therapist Aida Martines 1x hospitalization 1 month ago at [facility].  SH: Undergrad at [employer]. Lives with parents.    Week of 5/9: Patient reports significantly improved sleep with prazosin, as she was experiencing nightmares. Depression is improving also. Patient identifies with the diagnosis of borderline personality disorder as well as OCD traits. Will discuss treatment plan in family meeting on Friday, with plan for patient to start PHP on Tuesday.    1. Admission status  9.13 (Voluntary)    2. Significant lab findings  - COVID-19 positive (4/22, 4/29, 5/2)    3. Psychotropic medications  - Duloxetine 120 mg daily (depression)  	- Home medication, inc 5/7  - Prazosin 4 mg QHS (nightmares)  	- Start 5/4, inc 5/5  D/c mirtazapine - poor efficacy    4. Medical conditions, other medications, consults  None    5. Psychosocial interventions  - Family contacted: Contacted father 5/11  
Patient is a 21-year-old female with a history of major depressive disorder, FRANSISCO, and cluster B traits who initially presented for a suicide attempt by overdose on 4-6 ounces of alcohol and half a milligram of Klonopin while attending a partial hospitalization program. The patient has been hospitalized twice this year. The patient has had numerous failed medication trials. The normal course of her illness can be characterized by rapid decompensation, but also rapid improvement after hospitalization. Due to experiencing upper respiratory symptoms, she was tested for COVID-19, and has been quarantined at [facility] for the past nine days. Since her return, her mood has improved, though she expresses continued difficulty with sleep and increased appetite. Given her recent suicidality, she is a danger to herself and meets criteria for inpatient psychiatric hospitalization.  PsyHx: Sees Dr. Morataya x 3 years. Therapist Aida Martines 1x hospitalization 1 month ago at [facility].  SH: Undergrad at [employer]. Lives with parents.    1. Admission status  9.13 (Voluntary)    2. Significant lab findings  - COVID-19 positive (4/22, 4/29, 5/2)    3. Psychotropic medications  - Duloxetine 90 mg daily (depression)  	- Home medication  - Prazosin 2 mg QHS (nightmares)  	- Start 5/4  D/c mirtazapine - poor efficacy    4. Medical conditions, other medications, consults  None    5. Psychosocial interventions  - Family contacted: DIOGO  
Patient is a 21-year-old female with a history of major depressive disorder, FRANSISCO, and cluster B traits who initially presented for a suicide attempt by overdose on 4-6 ounces of alcohol and half a milligram of Klonopin while attending a partial hospitalization program. The patient has been hospitalized twice this year. The patient has had numerous failed medication trials. The normal course of her illness can be characterized by rapid decompensation, but also rapid improvement after hospitalization. Due to experiencing upper respiratory symptoms, she was tested for COVID-19, and has been quarantined at [facility] for the past nine days. Since her return, her mood has improved, though she expresses continued difficulty with sleep and increased appetite. Given her recent suicidality, she is a danger to herself and meets criteria for inpatient psychiatric hospitalization.  PsyHx: Sees Dr. Morataya x 3 years. Therapist Aida Martines 1x hospitalization 1 month ago at [facility].  SH: Undergrad at [employer]. Lives with parents.    Week of 5/9: Patient reports significantly improved sleep with prazosin, as she was experiencing nightmares. Depression is improving also. Patient hopes to have a family meeting prior to her discharge.    1. Admission status  9.13 (Voluntary)    2. Significant lab findings  - COVID-19 positive (4/22, 4/29, 5/2)    3. Psychotropic medications  - Duloxetine 120 mg daily (depression)  	- Home medication, inc 5/7  - Prazosin 4 mg QHS (nightmares)  	- Start 5/4, inc 5/5  D/c mirtazapine - poor efficacy    4. Medical conditions, other medications, consults  None    5. Psychosocial interventions  - Family contacted: DIOGO  
Patient is a 21-year-old female with a history of major depressive disorder, FRANSISCO, and cluster B traits who initially presented for a suicide attempt by overdose on 4-6 ounces of alcohol and half a milligram of Klonopin while attending a partial hospitalization program. The patient has been hospitalized twice this year. The patient has had numerous failed medication trials. The normal course of her illness can be characterized by rapid decompensation, but also rapid improvement after hospitalization. Due to experiencing upper respiratory symptoms, she was tested for COVID-19, and has been quarantined at [facility] for the past nine days. Since her return, her mood has improved, though she expresses continued difficulty with sleep and increased appetite. Given her recent suicidality, she is a danger to herself and meets criteria for inpatient psychiatric hospitalization.  PsyHx: Sees Dr. Morataya x 3 years. Therapist Aida Martines 1x hospitalization 1 month ago at [facility].  SH: Undergrad at [employer]. Lives with parents.    5/7  Patient reporting some improvement    Denies hopelessness and S/I    1. Admission status  9.13 (Voluntary)    2. Significant lab findings  - COVID-19 positive (4/22, 4/29, 5/2)    3. Psychotropic medications  - Duloxetine 120 mg daily (depression)  	- Home medication, inc 5/7  - Prazosin 4 mg QHS (nightmares)  	- Start 5/4, inc 5/5  D/c mirtazapine - poor efficacy    4. Medical conditions, other medications, consults  None    5. Psychosocial interventions  - Family contacted: DIOGO  
Patient is a 21-year-old female with a history of major depressive disorder, FRANSISCO, and cluster B traits who initially presented for a suicide attempt by overdose on 4-6 ounces of alcohol and half a milligram of Klonopin while attending a partial hospitalization program. The patient has been hospitalized twice this year. The patient has had numerous failed medication trials. The normal course of her illness can be characterized by rapid decompensation, but also rapid improvement after hospitalization. Due to experiencing upper respiratory symptoms, she was tested for COVID-19, and has been quarantined at [facility] for the past nine days. Since her return, her mood has improved, though she expresses continued difficulty with sleep and increased appetite. Given her recent suicidality, she is a danger to herself and meets criteria for inpatient psychiatric hospitalization.  PsyHx: Sees Dr. Morataya x 3 years. Therapist Aida Martines 1x hospitalization 1 month ago at [facility].  [facility] [employer]. Lives with parents.    Week of 5/9: Patient reports significantly improved sleep with prazosin, as she was experiencing nightmares. Depression is improving also. Patient identifies with the diagnosis of borderline personality disorder as well as OCD traits. Will discuss treatment plan in family meeting on Friday, with plan for patient to start PHP on Tuesday.  Week of 5/16: Patient exhibits some anxiety and continued passive SI, but she commits to staying safe with PHP treatment after discharge.    1. Admission status  9.13 (Voluntary)    2. Significant lab findings  - COVID-19 positive (4/22, 4/29, 5/2)    3. Psychotropic medications  - Duloxetine 120 mg daily (depression)  	- Home medication, inc 5/7  - Prazosin 4 mg QHS (nightmares)  	- Start 5/4, inc 5/5  D/c mirtazapine - poor efficacy    4. Medical conditions, other medications, consults  None    5. Psychosocial interventions  - Family contacted: Contacted father 5/11  
Patient is a 21-year-old female with a history of major depressive disorder, FRANSISCO, and cluster B traits who initially presented for a suicide attempt by overdose on 4-6 ounces of alcohol and half a milligram of Klonopin while attending a partial hospitalization program. The patient has been hospitalized twice this year. The patient has had numerous failed medication trials. The normal course of her illness can be characterized by rapid decompensation, but also rapid improvement after hospitalization. Due to experiencing upper respiratory symptoms, she was tested for COVID-19, and has been quarantined at [facility] for the past nine days. Since her return, her mood has improved, though she expresses continued difficulty with sleep and increased appetite. Given her recent suicidality, she is a danger to herself and meets criteria for inpatient psychiatric hospitalization.  PsyHx: Sees Dr. Morataya x 3 years. Therapist Aida Martines 1x hospitalization 1 month ago at [facility].  [facility] [employer]. Lives with parents.    Week of 5/9: Patient reports significantly improved sleep with prazosin, as she was experiencing nightmares. Depression is improving also. Patient identifies with the diagnosis of bordeline personality disorder as well as OCD traits. Will discuss treatment plan in family meeting on Friday, with plan for patient to start PHP on Tuesday.    1. Admission status  9.13 (Voluntary)    2. Significant lab findings  - COVID-19 positive (4/22, 4/29, 5/2)    3. Psychotropic medications  - Duloxetine 120 mg daily (depression)  	- Home medication, inc 5/7  - Prazosin 4 mg QHS (nightmares)  	- Start 5/4, inc 5/5  D/c mirtazapine - poor efficacy    4. Medical conditions, other medications, consults  None    5. Psychosocial interventions  - Family contacted: Contacted father 5/11  
Patient is a 21-year-old female with a history of major depressive disorder, FRANSISCO, and cluster B traits who initially presented for a suicide attempt by overdose on 4-6 ounces of alcohol and half a milligram of Klonopin while attending a partial hospitalization program. The patient has been hospitalized twice this year. The patient has had numerous failed medication trials. The normal course of her illness can be characterized by rapid decompensation, but also rapid improvement after hospitalization. Due to experiencing upper respiratory symptoms, she was tested for COVID-19, and has been quarantined at [facility] for the past nine days. Since her return, her mood has improved, though she expresses continued difficulty with sleep and increased appetite. Given her recent suicidality, she is a danger to herself and meets criteria for inpatient psychiatric hospitalization.  PsyHx: Sees Dr. Morataya x 3 years. Therapist Aida Martines 1x hospitalization 1 month ago at [facility].  SH: Undergrad at [employer]. Lives with parents.    Week of 5/9: Patient reports significantly improved sleep with prazosin, as she was experiencing nightmares. Depression is improving also. Patient hopes to have a family meeting prior to her discharge.    1. Admission status  9.13 (Voluntary)    2. Significant lab findings  - COVID-19 positive (4/22, 4/29, 5/2)    3. Psychotropic medications  - Duloxetine 120 mg daily (depression)  	- Home medication, inc 5/7  - Prazosin 4 mg QHS (nightmares)  	- Start 5/4, inc 5/5  D/c mirtazapine - poor efficacy    4. Medical conditions, other medications, consults  None    5. Psychosocial interventions  - Family contacted: DIOGO  
Patient is a 21-year-old female with a history of major depressive disorder, FRANSISCO, and cluster B traits who initially presented for a suicide attempt by overdose on 4-6 ounces of alcohol and half a milligram of Klonopin while attending a partial hospitalization program. The patient has been hospitalized twice this year. The patient has had numerous failed medication trials. The normal course of her illness can be characterized by rapid decompensation, but also rapid improvement after hospitalization. Due to experiencing upper respiratory symptoms, she was tested for COVID-19, and has been quarantined at [facility] for the past nine days. Since her return, her mood has improved, though she expresses continued difficulty with sleep and increased appetite. Given her recent suicidality, she is a danger to herself and meets criteria for inpatient psychiatric hospitalization.  PsyHx: Sees Dr. Morataya x 3 years. Therapist Aida Martines 1x hospitalization 1 month ago at [facility].  SH: Undergrad at [employer]. Lives with parents.    Week of 5/6: Mood is anxious but improving. Reports that her mother pushing her to get better (e.g. making her take a walk when she wants to watch TV) is a trigger for her. Does not meet criteria for OCD but has some compulsive traits (e.g. needing to do another puzzle when her first puzzle was missing pieces). No SI at this time.    1. Admission status  9.13 (Voluntary)    2. Significant lab findings  - COVID-19 positive (4/22, 4/29, 5/2)    3. Psychotropic medications  - Duloxetine 120 mg daily (depression)  	- Home medication, inc 5/7  - Prazosin 4 mg QHS (nightmares)  	- Start 5/4, inc 5/5  D/c mirtazapine - poor efficacy    4. Medical conditions, other medications, consults  None    5. Psychosocial interventions  - Family contacted: DIOGO

## 2022-05-16 NOTE — BH INPATIENT PSYCHIATRY PROGRESS NOTE - MSE OPTIONS
Structured MSE
Unstructured MSE
Structured MSE

## 2022-05-16 NOTE — BH PSYCHOLOGY - CLINICIAN PSYCHOTHERAPY NOTE - NSTXDCFAMGOAL_PSY_ALL_CORE
Will agree to involve supports/family in treatment and discharge planning

## 2022-05-16 NOTE — BH INPATIENT PSYCHIATRY PROGRESS NOTE - NSTXCOPEDATETRGT_PSY_ALL_CORE
18-May-2022
11-May-2022
11-May-2022
18-May-2022
11-May-2022
16-May-2022
11-May-2022

## 2022-05-16 NOTE — BH INPATIENT PSYCHIATRY PROGRESS NOTE - NSCGIIMPROVESX_PSY_ALL_CORE
2 = Much improved - notably better with signficant reduction of symptoms; increase in the level of functioning but some symptoms remain
3 = Minimally improved - slightly better with little or no clinically meaningful reduction of symptoms.  Represents very little change in basic clinical status, level of care, or functional capacity.
2 = Much improved - notably better with signficant reduction of symptoms; increase in the level of functioning but some symptoms remain
3 = Minimally improved - slightly better with little or no clinically meaningful reduction of symptoms.  Represents very little change in basic clinical status, level of care, or functional capacity.

## 2022-05-16 NOTE — BH PSYCHOLOGY - CLINICIAN PSYCHOTHERAPY NOTE - TOKEN PULL-DIAGNOSIS
Primary Diagnosis:  Depression, major [F32.9]        Problem Dx:   
Primary Diagnosis:  MDD (major depressive disorder) [F32.9]      Depression, major [F32.9]        Problem Dx:   
Primary Diagnosis:  MDD (major depressive disorder) [F32.9]      Depression, major [F32.9]        Problem Dx:

## 2022-05-16 NOTE — BH INPATIENT PSYCHIATRY PROGRESS NOTE - NSBHFUPINTERVALHXFT_PSY_A_CORE
Chart reviewed, including vital signs, medication administration record, lab results, and nursing notes.   Case discussed with nursing, psychology, psych rehab, and social work in team meeting.     Patient is seen in the group room, in no acute distress, amenable to interview. States that she feels anxious about returning home, and particularly asserting her boundaries with her parents. I encouraged her to write a list of actions that her parents perform that are helpful and not helpful. She reports some poor sleep due to nightmares. Reports no medication side effects. Reports stable appetite. Denies SI/HI/AVH. She agrees with a plan for discharge on Monday with follow up at partial hospitalization.    Family meeting with parents:  - Discussed methods parents can support the patient. Discussed aftercare options. Patient wrote a list of actions her parents can do that are helpful or not helpful.  Father denies any concerns with the plan for discharge and is able to contract for safety. Denies any guns/lethal weapons are present in the home. Agrees with the plan to follow-up with the outpatient provider for further medication management. Discussed safety planning and to call the outpatient psychiatrist and/or 911 if the patient expresses any danger to self or others in the future. 
Chart reviewed, including vital signs, medication administration record, lab results, and nursing notes.   Case discussed with nursing, psychology, psych rehab, and social work in team meeting.     Patient is seen in the group room, in no acute distress, amenable to interview. States that she is happy to be back at [facility], and states that her depression has improved. However, she continues to feel anxious and on edge about the uncertainty of her treatment. She states that Remeron has caused her to always be hungry, and she does not feel it has helped her with sleep. She reports sleeping 6 to 10 hours nightly, but has awakenings in the middle of the night related to experiencing nightmares. She denies any recurrent nightmares, but states that she “censors what she watches on TV“ due to her propensity to get nightmares. We discussed the plan to discontinue Remeron and start Prazosin 2 mg nightly for nightmares. She reports decreased sleep and increased appetite. Denies medication side effects. Denies SI/HI/AVH.
Chart reviewed, including vital signs, medication administration record, lab results, and nursing notes.   Case discussed with nursing, psychology, psych rehab, and social work in team meeting.     Patient is seen in the group room, in no acute distress, amenable to interview. She reports experiencing some more anxiety today, but states that she has been able to “sit with it“. She agrees with the plan to have a family meeting tomorrow. We discussed the criteria for borderline personality disorder. She reports no medication side effects. Reports stable sleep and appetite. Denies SI/HI/AVH.
Chart reviewed, including vital signs, medication administration record, lab results, and nursing notes.   Case discussed with nursing, psychology, psych rehab, and social work in team meeting.     Patient is seen in the group room, in no acute distress, amenable to interview. States that over the weekend, she was “keeping more to herself“. Reports her mood is “good“. Reports stable sleep and appetite. Denies medication side effects. Denies SI/HI/AVH. She feels that prazosin has been an effective medication to help her sleep. She acknowledges that her parents visited over the weekend. States that her therapist is helping to deal with her obsessions and compulsions since she came to the hospital.
Patient seen and examined for follow up of  depression and anxiety  Chart reviewed and case discussed with nursing staff  Patient reports some improvement in her sleep  Denies nightmares and denies orthostatic hypotension 
Chart reviewed, including vital signs, medication administration record, lab results, and nursing notes.   Case discussed with nursing, psychology, psych rehab, and social work in team meeting.     Patient is seen in the group room, in no acute distress, amenable to interview. She reports decreased sleep due to peers that were loud on the unit. Reports stable appetite. Denies medication side effects. Denies SI/HI/AVH. She continues to report feeling anxious. She reports experiencing fewer nightmares, and denies any side effects since starting prazosin . States that she did visit with her father last night, and it went well. She hopes to continue her stay and get more therapy during this hospitalization. We discussed the plan to increase the dose of prazosin to 4 mg nightly and also provide a nutrition consult.
Chart reviewed, including vital signs, medication administration record, lab results, and nursing notes.   Case discussed with nursing, psychology, psych rehab, and social work in team meeting.     Patient is seen in the day room, in no acute distress, amenable to interview. We update her on the plan for a family meeting to occur Friday at 1:30 PM. States that she is somewhat frustrated about her continued attendance at groups, because she feels that they are repetitive. She feels the most helpful part of hospitalization is participating in exposure and response therapy with Dr. Baldwin. She expresses obsessions about disappointing or hurting others, and she is learning to feel OK with the anxiety. She request MiraLAX for constipation. Reports no medication side effects. Reports stable sleep and appetite. Denies SI/HI/AVH.
Chart reviewed, including vital signs, medication administration record, lab results, and nursing notes.   Case discussed with nursing, psychology, psych rehab, and social work in team meeting.     Patient is seen in the day room, in no acute distress, amenable to interview. States that her mother and father visited her in the hospital, and she hopes to have a family meeting with her parents prior to her discharge. She acknowledges that she feels ready to leave the hospital soon. We discuss the plan to enroll her in an in person partial hospitalization program. She identifies a diagnosis of obsessive compulsive traits, and states that she is working with Dr. Baldwin with the anxiety of offending other people. Reports stable sleep and appetite. Denies medication side effects. Denies SI/HI/AVH.
Chart reviewed, including vital signs, medication administration record, lab results, and nursing notes.   Case discussed with nursing, psychology, psych rehab, and social work in team meeting.     Patient is seen on the patio, in no acute distress, amenable to interview. States that she had much better sleep last night, and only slight nightmares. She does report some orthostatic hypotension this morning, experiencing dizziness and nausea for about one minute when she was waking up. However, she declines the need to adjust the dose further. States that outside the hospital, her mother often “pushes her too hard“ to take a walk, when she just wants to sit and watch television. She feels that this is a trigger for her, and she hopes to have a family meeting to address this.    States that she has not experienced suicidal ideation since last weekend. She reports a number of compulsive symptoms, particularly surrounding orderliness. For example, while she was doing a puzzle this morning, she became very anxious when there were missing puzzle pieces, and she had to do another puzzle. States that she also taps her fingers or scratches her head when she feels these obsessions. She reports stable sleep and appetite. Denies SI/HI/AVH. We discussed the plan to increase the dose of Cymbalta to 120 mg daily.
Chart reviewed, including vital signs, medication administration record, lab results, and nursing notes.   Case discussed with nursing, psychology, psych rehab, and social work in team meeting.     Patient is seen in the group room, in no acute distress, amenable to interview. She expresses some anxiety and is tearful today. She expresses anxiety about her discharge today, and states that she is concerned that she might return home and become suicidal again. However, she denies active suicidal ideation today, though she reports fleeting thoughts of passive suicidal ideation. She reports stable sleep and appetite. Denies medication side effects. She denies any safety concerns with the plan for discharge today.

## 2022-05-16 NOTE — BH INPATIENT PSYCHIATRY PROGRESS NOTE - NSTXDCOPLKDATEEST_PSY_ALL_CORE
04-May-2022
11-May-2022
04-May-2022
11-May-2022
04-May-2022
04-May-2022
11-May-2022
11-May-2022

## 2022-05-16 NOTE — BH INPATIENT PSYCHIATRY PROGRESS NOTE - NSTXDCOPLKDATETRGT_PSY_ALL_CORE
11-May-2022
18-May-2022
11-May-2022
18-May-2022
11-May-2022
18-May-2022
18-May-2022
11-May-2022

## 2022-05-16 NOTE — BH PSYCHOLOGY - CLINICIAN PSYCHOTHERAPY NOTE - NSTXPROBDCFAM_PSY_ALL_CORE
DISCHARGE ISSUE - POOR ENGAGEMENT WITH SUPPORTS/FAMILY

## 2022-05-16 NOTE — BH INPATIENT PSYCHIATRY PROGRESS NOTE - NSBHMETABOLIC_PSY_ALL_CORE_FT
BMI: BMI (kg/m2): 27.4 (05-03-22 @ 17:30)  HbA1c: A1C with Estimated Average Glucose Result: 4.6 % (04-19-22 @ 11:13)    Glucose: POCT Blood Glucose.: 122 mg/dL (04-17-22 @ 16:05)    BP: --  Lipid Panel: Date/Time: 04-23-22 @ 07:02  Cholesterol, Serum: 140  Direct LDL: --  HDL Cholesterol, Serum: 63  Total Cholesterol/HDL Ration Measurement: --  Triglycerides, Serum: 147  
BMI: BMI (kg/m2): 27.4 (05-03-22 @ 17:30)  HbA1c: A1C with Estimated Average Glucose Result: 4.6 % (04-19-22 @ 11:13)    Glucose: POCT Blood Glucose.: 122 mg/dL (04-17-22 @ 16:05)    BP: 110/76 (05-16-22 @ 08:49) (110/76 - 120/69)  Lipid Panel: Date/Time: 04-23-22 @ 07:02  Cholesterol, Serum: 140  Direct LDL: --  HDL Cholesterol, Serum: 63  Total Cholesterol/HDL Ration Measurement: --  Triglycerides, Serum: 147  
BMI: BMI (kg/m2): 27.4 (05-03-22 @ 17:30)  HbA1c: A1C with Estimated Average Glucose Result: 4.6 % (04-19-22 @ 11:13)    Glucose: POCT Blood Glucose.: 122 mg/dL (04-17-22 @ 16:05)    BP: 122/57 (05-09-22 @ 08:20) (110/70 - 122/57)  Lipid Panel: Date/Time: 04-23-22 @ 07:02  Cholesterol, Serum: 140  Direct LDL: --  HDL Cholesterol, Serum: 63  Total Cholesterol/HDL Ration Measurement: --  Triglycerides, Serum: 147  
BMI: BMI (kg/m2): 27.4 (05-03-22 @ 17:30)  HbA1c: A1C with Estimated Average Glucose Result: 4.6 % (04-19-22 @ 11:13)    Glucose: POCT Blood Glucose.: 122 mg/dL (04-17-22 @ 16:05)    BP: 110/67 (05-12-22 @ 07:35) (109/67 - 110/67)  Lipid Panel: Date/Time: 04-23-22 @ 07:02  Cholesterol, Serum: 140  Direct LDL: --  HDL Cholesterol, Serum: 63  Total Cholesterol/HDL Ration Measurement: --  Triglycerides, Serum: 147  
BMI: BMI (kg/m2): 27.4 (05-03-22 @ 17:30)  HbA1c: A1C with Estimated Average Glucose Result: 4.6 % (04-19-22 @ 11:13)    Glucose: POCT Blood Glucose.: 122 mg/dL (04-17-22 @ 16:05)    BP: 125/77 (05-07-22 @ 06:53) (106/62 - 125/77)  Lipid Panel: Date/Time: 04-23-22 @ 07:02  Cholesterol, Serum: 140  Direct LDL: --  HDL Cholesterol, Serum: 63  Total Cholesterol/HDL Ration Measurement: --  Triglycerides, Serum: 147  
BMI: BMI (kg/m2): 27.4 (05-03-22 @ 17:30)  HbA1c: A1C with Estimated Average Glucose Result: 4.6 % (04-19-22 @ 11:13)    Glucose: POCT Blood Glucose.: 122 mg/dL (04-17-22 @ 16:05)    BP: 106/62 (05-06-22 @ 08:11) (106/62 - 118/69)  Lipid Panel: Date/Time: 04-23-22 @ 07:02  Cholesterol, Serum: 140  Direct LDL: --  HDL Cholesterol, Serum: 63  Total Cholesterol/HDL Ration Measurement: --  Triglycerides, Serum: 147  
BMI: BMI (kg/m2): 27.4 (05-03-22 @ 17:30)  HbA1c: A1C with Estimated Average Glucose Result: 4.6 % (04-19-22 @ 11:13)    Glucose: POCT Blood Glucose.: 122 mg/dL (04-17-22 @ 16:05)    BP: 122/57 (05-09-22 @ 08:20) (110/70 - 125/77)  Lipid Panel: Date/Time: 04-23-22 @ 07:02  Cholesterol, Serum: 140  Direct LDL: --  HDL Cholesterol, Serum: 63  Total Cholesterol/HDL Ration Measurement: --  Triglycerides, Serum: 147  
BMI: BMI (kg/m2): 27.4 (05-03-22 @ 17:30)  HbA1c: A1C with Estimated Average Glucose Result: 4.6 % (04-19-22 @ 11:13)    Glucose: POCT Blood Glucose.: 122 mg/dL (04-17-22 @ 16:05)    BP: 118/69 (05-05-22 @ 06:25) (118/69 - 118/69)  Lipid Panel: Date/Time: 04-23-22 @ 07:02  Cholesterol, Serum: 140  Direct LDL: --  HDL Cholesterol, Serum: 63  Total Cholesterol/HDL Ration Measurement: --  Triglycerides, Serum: 147  
BMI: BMI (kg/m2): 27.4 (05-03-22 @ 17:30)  HbA1c: A1C with Estimated Average Glucose Result: 4.6 % (04-19-22 @ 11:13)    Glucose: POCT Blood Glucose.: 122 mg/dL (04-17-22 @ 16:05)    BP: 127/70 (05-13-22 @ 07:58) (109/67 - 127/70)  Lipid Panel: Date/Time: 04-23-22 @ 07:02  Cholesterol, Serum: 140  Direct LDL: --  HDL Cholesterol, Serum: 63  Total Cholesterol/HDL Ration Measurement: --  Triglycerides, Serum: 147  
BMI: BMI (kg/m2): 27.4 (05-03-22 @ 17:30)  HbA1c: A1C with Estimated Average Glucose Result: 4.6 % (04-19-22 @ 11:13)    Glucose: POCT Blood Glucose.: 122 mg/dL (04-17-22 @ 16:05)    BP: 109/67 (05-11-22 @ 07:46) (109/67 - 122/57)  Lipid Panel: Date/Time: 04-23-22 @ 07:02  Cholesterol, Serum: 140  Direct LDL: --  HDL Cholesterol, Serum: 63  Total Cholesterol/HDL Ration Measurement: --  Triglycerides, Serum: 147

## 2022-05-16 NOTE — BH INPATIENT PSYCHIATRY PROGRESS NOTE - PRN MEDS
MEDICATIONS  (PRN):  hydrOXYzine hydrochloride 25 milliGRAM(s) Oral every 6 hours PRN Anxiety  LORazepam     Tablet 1 milliGRAM(s) Oral every 6 hours PRN Agitation  LORazepam   Injectable 1 milliGRAM(s) IntraMuscular once PRN Severe Agitation  
MEDICATIONS  (PRN):  calcium carbonate    500 mG (Tums) Chewable 1 Tablet(s) Chew three times a day PRN GERD  hydrOXYzine hydrochloride 25 milliGRAM(s) Oral every 6 hours PRN Anxiety  LORazepam     Tablet 1 milliGRAM(s) Oral every 6 hours PRN Agitation  LORazepam   Injectable 1 milliGRAM(s) IntraMuscular once PRN Severe Agitation  
MEDICATIONS  (PRN):  acetaminophen     Tablet .. 650 milliGRAM(s) Oral every 6 hours PRN Mild Pain (1 - 3)  calcium carbonate    500 mG (Tums) Chewable 1 Tablet(s) Chew three times a day PRN GERD  hydrOXYzine hydrochloride 25 milliGRAM(s) Oral every 6 hours PRN Anxiety  LORazepam     Tablet 1 milliGRAM(s) Oral every 6 hours PRN Agitation  LORazepam   Injectable 1 milliGRAM(s) IntraMuscular once PRN Severe Agitation  
MEDICATIONS  (PRN):  acetaminophen     Tablet .. 650 milliGRAM(s) Oral every 6 hours PRN Mild Pain (1 - 3)  calcium carbonate    500 mG (Tums) Chewable 1 Tablet(s) Chew three times a day PRN GERD  hydrOXYzine hydrochloride 25 milliGRAM(s) Oral every 6 hours PRN Anxiety  LORazepam     Tablet 1 milliGRAM(s) Oral every 6 hours PRN Agitation  LORazepam   Injectable 1 milliGRAM(s) IntraMuscular once PRN Severe Agitation  
MEDICATIONS  (PRN):  hydrOXYzine hydrochloride 25 milliGRAM(s) Oral every 6 hours PRN Anxiety  LORazepam     Tablet 1 milliGRAM(s) Oral every 6 hours PRN Agitation  LORazepam   Injectable 1 milliGRAM(s) IntraMuscular once PRN Severe Agitation  
MEDICATIONS  (PRN):  acetaminophen     Tablet .. 650 milliGRAM(s) Oral every 6 hours PRN Mild Pain (1 - 3)  calcium carbonate    500 mG (Tums) Chewable 1 Tablet(s) Chew three times a day PRN GERD  hydrOXYzine hydrochloride 25 milliGRAM(s) Oral every 6 hours PRN Anxiety  LORazepam     Tablet 1 milliGRAM(s) Oral every 6 hours PRN Agitation  LORazepam   Injectable 1 milliGRAM(s) IntraMuscular once PRN Severe Agitation  
MEDICATIONS  (PRN):  hydrOXYzine hydrochloride 25 milliGRAM(s) Oral every 6 hours PRN Anxiety  LORazepam     Tablet 1 milliGRAM(s) Oral every 6 hours PRN Agitation  LORazepam   Injectable 1 milliGRAM(s) IntraMuscular once PRN Severe Agitation  
MEDICATIONS  (PRN):  hydrOXYzine hydrochloride 25 milliGRAM(s) Oral every 6 hours PRN Anxiety  LORazepam     Tablet 1 milliGRAM(s) Oral every 6 hours PRN Agitation  LORazepam   Injectable 1 milliGRAM(s) IntraMuscular once PRN Severe Agitation  
MEDICATIONS  (PRN):  calcium carbonate    500 mG (Tums) Chewable 1 Tablet(s) Chew three times a day PRN GERD  hydrOXYzine hydrochloride 25 milliGRAM(s) Oral every 6 hours PRN Anxiety  LORazepam     Tablet 1 milliGRAM(s) Oral every 6 hours PRN Agitation  LORazepam   Injectable 1 milliGRAM(s) IntraMuscular once PRN Severe Agitation  
MEDICATIONS  (PRN):  calcium carbonate    500 mG (Tums) Chewable 1 Tablet(s) Chew three times a day PRN GERD  hydrOXYzine hydrochloride 25 milliGRAM(s) Oral every 6 hours PRN Anxiety  LORazepam     Tablet 1 milliGRAM(s) Oral every 6 hours PRN Agitation  LORazepam   Injectable 1 milliGRAM(s) IntraMuscular once PRN Severe Agitation

## 2022-05-16 NOTE — BH INPATIENT PSYCHIATRY PROGRESS NOTE - NSTXSUICIDINTERMD_PSY_ALL_CORE
Antidepressant  DBT Skills  Psychoeducation

## 2022-05-16 NOTE — BH INPATIENT PSYCHIATRY PROGRESS NOTE - NSTXDCFAMDATETRGT_PSY_ALL_CORE
26-Apr-2022

## 2022-05-16 NOTE — BH INPATIENT PSYCHIATRY PROGRESS NOTE - NSTXDEPRESGOAL_PSY_ALL_CORE
Will identify 2 coping skills that assist in improving mood

## 2022-05-16 NOTE — BH INPATIENT PSYCHIATRY PROGRESS NOTE - NSBHFUPINTERVALCCFT_PSY_A_CORE
"I'm feeling a little better"
Anxiety
"I'm feeling a little better"
"I'm feeling a little better"
Anxiety
Anxiety
"I'm feeling a little better"
"I'm feeling a little better"

## 2022-05-16 NOTE — BH INPATIENT PSYCHIATRY PROGRESS NOTE - NSTXDCFAMDATEEST_PSY_ALL_CORE
19-Apr-2022

## 2022-05-16 NOTE — BH INPATIENT PSYCHIATRY PROGRESS NOTE - NSTXANXDATEEST_PSY_ALL_CORE
30-Mar-2022

## 2022-05-16 NOTE — BH PSYCHOLOGY - CLINICIAN PSYCHOTHERAPY NOTE - NSTXSUICIDGOAL_PSY_ALL_CORE
Talk to staff and ask for assistance when having suicidal wishes instead of acting out

## 2022-05-16 NOTE — BH INPATIENT PSYCHIATRY PROGRESS NOTE - NSTXPROBDCFAM_PSY_ALL_CORE
DISCHARGE ISSUE - POOR ENGAGEMENT WITH SUPPORTS/FAMILY

## 2022-05-16 NOTE — BH PSYCHOLOGY - CLINICIAN PSYCHOTHERAPY NOTE - NSBHPSYCHOLINT_PSY_A_CORE
Cognitive/behavioral therapy/Dialectical  Behavioral Therapy (DBT)
Cognitive/behavioral therapy/Dialectical  Behavioral Therapy (DBT)
Cognitive/behavioral therapy

## 2022-05-16 NOTE — BH INPATIENT PSYCHIATRY PROGRESS NOTE - NSDCCRITERIA_PSY_ALL_CORE
improvement in symptoms 

## 2022-05-16 NOTE — BH INPATIENT PSYCHIATRY PROGRESS NOTE - NSTXSUICIDDATETRGT_PSY_ALL_CORE
25-Apr-2022

## 2022-05-16 NOTE — BH INPATIENT PSYCHIATRY PROGRESS NOTE - NSBHINPTBILLING_PSY_ALL_CORE
74927 - Inpatient Moderate Complexity
59109 - Inpatient Low Complexity
[id number] - Inpatient Low Complexity
07846 - Inpatient Low Complexity
19035 - Inpatient Low Complexity
59650 - Inpatient Moderate Complexity
59101 - Inpatient Low Complexity
98956 - Inpatient Moderate Complexity
43766 - Inpatient Low Complexity
53822 - Inpatient Low Complexity

## 2022-05-16 NOTE — BH INPATIENT PSYCHIATRY PROGRESS NOTE - NSICDXBHPRIMARYDX_PSY_ALL_CORE
Depression, major   F32.9  
Depression, major   F32.9  
MDD (major depressive disorder)   F32.9  
MDD (major depressive disorder)   F32.9  
Depression, major   F32.9  
MDD (major depressive disorder)   F32.9  
Depression, major   F32.9  
MDD (major depressive disorder)   F32.9

## 2022-05-16 NOTE — BH PSYCHOLOGY - CLINICIAN PSYCHOTHERAPY NOTE - NSBHPSYCHOLGOALS_PSY_A_CORE
Decrease symptoms/Assessment/Improve social/vocational/coping skills/Psychoeducation

## 2022-05-18 ENCOUNTER — INPATIENT (INPATIENT)
Facility: HOSPITAL | Age: 22
LOS: 19 days | Discharge: HOME | End: 2022-06-07
Attending: STUDENT IN AN ORGANIZED HEALTH CARE EDUCATION/TRAINING PROGRAM | Admitting: STUDENT IN AN ORGANIZED HEALTH CARE EDUCATION/TRAINING PROGRAM
Payer: COMMERCIAL

## 2022-05-18 VITALS — RESPIRATION RATE: 18 BRPM | TEMPERATURE: 98 F | HEIGHT: 64 IN | WEIGHT: 158.95 LBS

## 2022-05-18 DIAGNOSIS — F32.9 MAJOR DEPRESSIVE DISORDER, SINGLE EPISODE, UNSPECIFIED: ICD-10-CM

## 2022-05-18 DIAGNOSIS — S82.892A OTHER FRACTURE OF LEFT LOWER LEG, INITIAL ENCOUNTER FOR CLOSED FRACTURE: Chronic | ICD-10-CM

## 2022-05-18 RX ORDER — PRAZOSIN HCL 2 MG
4 CAPSULE ORAL AT BEDTIME
Refills: 0 | Status: DISCONTINUED | OUTPATIENT
Start: 2022-05-19 | End: 2022-06-07

## 2022-05-18 RX ORDER — LORATADINE 10 MG/1
10 TABLET ORAL DAILY
Refills: 0 | Status: DISCONTINUED | OUTPATIENT
Start: 2022-05-18 | End: 2022-06-07

## 2022-05-18 RX ORDER — DULOXETINE HYDROCHLORIDE 30 MG/1
120 CAPSULE, DELAYED RELEASE ORAL DAILY
Refills: 0 | Status: DISCONTINUED | OUTPATIENT
Start: 2022-05-19 | End: 2022-06-07

## 2022-05-18 RX ORDER — ATORVASTATIN CALCIUM 80 MG/1
10 TABLET, FILM COATED ORAL AT BEDTIME
Refills: 0 | Status: DISCONTINUED | OUTPATIENT
Start: 2022-05-19 | End: 2022-06-07

## 2022-05-18 RX ORDER — HYDROXYZINE HCL 10 MG
25 TABLET ORAL EVERY 6 HOURS
Refills: 0 | Status: DISCONTINUED | OUTPATIENT
Start: 2022-05-18 | End: 2022-06-07

## 2022-05-18 NOTE — BH CHART NOTE - NSEVENTNOTEFT_PSY_ALL_CORE
HPI:    Patient evaluated by MICHELLE.  On assessment, patient reports having difficulty managing outside of the hospital. Reports that she would spend "all day crying" during PHP. States that she mentioned having SI to her therapist which lead to subsequent ED evaluation where "they barely said anything to me other than I was being admitted. At first they were going to admit me there but my parents were able to convince them to come back here". On the unit patient denies active suicidality ("just passive thoughts") and reports she can come to staff if this changes or if she develops urges toward self harm.      Referring hospital ([facility] ED) did not send clinicals on admission, primary team to f/u on fax/clinicals from sending hospital.    PPH: multiple psych admissions, last on 1S, 5/16/22.     PMH: HLD, allergies    Medications: Claritin 10 mg, atorvastatin 10 mg, Cymbalta 120 mg, prasozin 4mg, birth control (parents to bring in am).     Allergies: none    Substance: denies    Family Hx: deferred    Social Hx: deferred    Access to weapons/guns: none    Vitals:  T(F): --  HR: --  BP: --  RR: --  SpO2: --    MSE:  Appearance: appears stated age, dressed in hospital gown, well groomed. Behavior: cooperative, appropriate eye contact, in good behavioral control. Motor: no PMR/PMA. No abnormal movements including tremor. Speech: no increased latency, normal rate, tone, volume. TP: linear, goal-directed. TC: future-oriented. Endorses passive SI, denies Active SI/HI/I/P, no urges for self-harm. No apparent delusions. Denies AH/VH. Mood: "good." Affect: neutral, reactive, mood congruent, appropriate. Cognition: alert, oriented to person, place, month and year. Fund of knowledge: intact. Attention/Concentration: intact. Insight: fair. Judgment: fair. Impulse control: fair.    Labs:                Risk Assessment: Immediate risk is minimized by inpatient admission to safe environment with appropriate supervision and limited access to lethal means. Future risk to be minimized by treatment of acute affective symptoms, maximizing outpatient supports, providing patient education, discussing emergency procedures, and ensuring close follow-up.    Acute risk factors include: current SI, anxiety, active mood episode, active substance use, poor reactivity to stressors, recent inpatient discharge    Chronic risk factors for suicide include: h/o prior psychiatric admissions, diagnosis of depressive d/o, Cluster B personality disorder, prior suicide attempts, h/o NSSIB    Protective factors include: Young, healthy, identifies reasons for living, future oriented, no active substance use, no active psychosis, stable housing, social supports, engaged in treatment, medication/follow up adherence, help-seeking behaviors, no legal history, adequate outpatient follow up with motivation to participate in care.      Based on risk assessment evaluation, patient IS NOT at acute risk of harm to self or others at this time, DOES NOT require 1:1 CO.      Assessment/Plan:    Patient is a 21-year-old female with a history of major depressive disorder, FRANSISCO, and cluster B traits, multiple prior psych hospitalizations, discharged from  5/16/22, hx of SA via ETOH/BZD OD, hx of NSSIB, presented to [facility] ED after patient disclosed SI to therapist. Admitted to [facility] from there.    Plan:  1.	Legals: admit on 9.27  2.	Safety: routine observation, denies SI/HI/I/P on the unit. PRNs: Ativan PO/IM/Atarax PO  3.	Psychiatry: Continue Cymbalta 120 mg qD, prazosin 4 mg qHS  4.	Group, milieu, individual therapy as appropriate.  5.	Medical: no acute medical issues, no significant PMH.  Admission labs WNL. Continue Atorvastatin 10 mg, Claritin 10 mg, obtain birth control from family for non-formulary  6.	Dispo: pending clinical improvement.  Patient continues to require inpatient hospitalization for stabilization and safety.    Patient requires inpatient admission due to affective symptoms with SI

## 2022-05-19 PROCEDURE — 99223 1ST HOSP IP/OBS HIGH 75: CPT

## 2022-05-19 RX ORDER — PRAZOSIN HCL 2 MG
4 CAPSULE ORAL ONCE
Refills: 0 | Status: COMPLETED | OUTPATIENT
Start: 2022-05-19 | End: 2022-05-19

## 2022-05-19 RX ORDER — ATORVASTATIN CALCIUM 80 MG/1
10 TABLET, FILM COATED ORAL ONCE
Refills: 0 | Status: COMPLETED | OUTPATIENT
Start: 2022-05-19 | End: 2022-05-19

## 2022-05-19 RX ADMIN — Medication 1 MILLIGRAM(S): at 09:33

## 2022-05-19 RX ADMIN — LORATADINE 10 MILLIGRAM(S): 10 TABLET ORAL at 08:38

## 2022-05-19 RX ADMIN — ATORVASTATIN CALCIUM 10 MILLIGRAM(S): 80 TABLET, FILM COATED ORAL at 00:32

## 2022-05-19 RX ADMIN — DULOXETINE HYDROCHLORIDE 120 MILLIGRAM(S): 30 CAPSULE, DELAYED RELEASE ORAL at 08:38

## 2022-05-19 RX ADMIN — Medication 25 MILLIGRAM(S): at 08:38

## 2022-05-19 RX ADMIN — Medication 4 MILLIGRAM(S): at 21:08

## 2022-05-19 RX ADMIN — Medication 4 MILLIGRAM(S): at 00:31

## 2022-05-19 RX ADMIN — ATORVASTATIN CALCIUM 10 MILLIGRAM(S): 80 TABLET, FILM COATED ORAL at 21:08

## 2022-05-19 NOTE — CHART NOTE - NSCHARTNOTEFT_GEN_A_CORE
Screening Medical Evaluation    Patient Admitted from: [facility] ED    [facility] admitting diagnosis: Major depressive disorder with single episode      PAST MEDICAL & SURGICAL HISTORY:  Anxiety disorder  Hyperlipidemia  Major depression  Hx of ankle fracture, L    ALLERGIES:  No Known Allergies    SOCIAL HISTORY:  Patient denies use of tobacco/nicotine, alcohol, or other substances.    FAMILY HISTORY:  Family history of early CAD (Father)      MEDICATIONS  (STANDING):  atorvastatin 10 milliGRAM(s) Oral at bedtime  DULoxetine 120 milliGRAM(s) Oral daily  loratadine 10 milliGRAM(s) Oral daily  prazosin. 4 milliGRAM(s) Oral at bedtime    MEDICATIONS  (PRN):  hydrOXYzine hydrochloride 25 milliGRAM(s) Oral every 6 hours PRN anxiety  LORazepam     Tablet 1 milliGRAM(s) Oral every 6 hours PRN anxiety or agitation  LORazepam   Injectable 1 milliGRAM(s) IntraMuscular once PRN agitation      Vital Signs Last 24 Hrs  T(C): 36.7 (18 May 2022 23:30), Max: 36.7 (18 May 2022 23:30)  T(F): 98 (18 May 2022 23:30), Max: 98 (18 May 2022 23:30)  HR: --  BP: --  BP(mean): --  RR: 18 (18 May 2022 23:30) (18 - 18)  SpO2: --      PHYSICAL EXAM:  GENERAL: NAD, well-developed  HEAD:  Atraumatic, Normocephalic  EYES: EOMI, PERRLA, conjunctiva and sclera clear  NECK: Supple, No JVD  CHEST/LUNG: Clear to auscultation bilaterally; No wheeze  HEART: Regular rate and rhythm; No murmurs, rubs, or gallops  ABDOMEN: Soft, Nontender, Nondistended; Bowel sounds present  EXTREMITIES:  2+ Peripheral Pulses, No clubbing, cyanosis, or edema  PSYCH: AAOx3  NEUROLOGY: non-focal  SKIN: No rashes or lesions      Assessment and Plan:  21F admitted to [facility] for major depressive disorder with single episode w/ PMHx of HLD seen at bedside for medical screening evaluation. Patient has no acute complaints at this time. Patient denies fever, chills, headache, dizziness, lightheadedness, N/V, SOB, cough, congestion, chest pain, abdominal pain, dysuria, hematuria, diarrhea, constipation. Physical exam unremarkable, VSS. Admission labs WNL.      1.) Major depressive disorder with single episode: Refer to primary team documentation for recs  2.) HLD: Continue atorvastatin 10mg QHS

## 2022-05-19 NOTE — BH INPATIENT PSYCHIATRY ASSESSMENT NOTE - HPI (INCLUDE ILLNESS QUALITY, SEVERITY, DURATION, TIMING, CONTEXT, MODIFYING FACTORS, ASSOCIATED SIGNS AND SYMPTOMS)
From admission interview:  Patient is seen in the group room, in no acute distress, amenable to interview. She exhibits a depressed affect. After discharge from her previous hospitalization, she was “panicking constantly“, and she did not feel that partial hospitalization met her expectations. States that she was offered individual therapy only once per week, and she did not feel that group therapy was helpful either. She reports informing her therapist that “all she could think about was death“ and subsequently she was sent back to the ED. She again attributes her symptoms to OCD, states that she experiences obsessions of death and suicide, despite feeling that she would not act on these thoughts. She identifies her compulsion as returning to the hospital. She reports no medication side effects. Reports decreased sleep due to nightmares and decreased appetite. Continues to endorse passive suicidal ideation.    From ED interview:  Patient evaluated by MICHELLE.  On assessment, patient reports having difficulty managing outside of the hospital. Reports that she would spend "all day crying" during PHP. States that she mentioned having SI to her therapist which lead to subsequent ED evaluation where "they barely said anything to me other than I was being admitted. At first they were going to admit me there but my parents were able to convince them to come back here". On the unit patient denies active suicidality ("just passive thoughts") and reports she can come to staff if this changes or if she develops urges toward self harm.

## 2022-05-19 NOTE — PSYCHIATRIC REHAB INITIAL EVALUATION - NSBHPRRECOMMEND_PSY_ALL_CORE
Writer met with patient in order to orient patient to unit and introduce patient to psychiatric staff and department functions. Patient was verbal, polite, and forthcoming according to ability with personal information on interview.     Pt was readmitted to hospital following a recent discharge. Pt has been experiencing panic and high anxiety due to obsessions of death and suicide. Pt retuned to hospital as a way to remedy the intrusive thoughts and passive SI. Pt stated the outpatient group and individual therapy were not enough to help her. Her mood is depressed and affect is blunted. Denied substance use. Pt denied any active SI/HI/brielle/psychosis. Pt is experiencing guilt and shame with her relapse.    Writer collaborated with patient to select an appropriate psychiatric rehabilitation goal. Pt was receptive. Psychiatric rehabilitation staff will continue to engage patient daily in order to develop therapeutic rapport. In response to COVID19, unit programming will be re-evaluated on a consistent basis in an effort to maintain safety guidelines. Pt was given a programming schedule. Writer encouraged pt to attend group therapy in the unit.

## 2022-05-19 NOTE — BH INPATIENT PSYCHIATRY ASSESSMENT NOTE - NSBHASSESSSUMMFT_PSY_ALL_CORE
Patient is a 21-year-old female with a history of major depressive disorder, FRANSISCO, and cluster B traits who initially presented for suicidal ideation, expressed to therapist while attending a partial hospitalization program. She was just discharged from this unit two days ago. The patient has been hospitalized thrice this year. The patient has had numerous failed medication trials. The normal course of her illness can be characterized by rapid decompensation, but also rapid improvement after hospitalization. The patient admits that hospitalization does not help her develop effective coping strategies outside the hospital, and she does not feel she needs continued hospitalization. However she meets criteria for hospitalization nonetheless given recent overdoses and failing partial hospitalization level of care.   PsyHx: Sees Dr. Morataya x 3 years. Therapist Aida Martines 1x hospitalization 1 month ago at [facility].  : [facility]. Lives with parents.    1. Admission status  9.27 (Involuntary Admission)    2. Significant lab findings  None    3. Psychotropic medications  - Duloxetine 120 mg daily (depression)  	- Home medication  - Prazosin 4 mg QHS (nightmares)  	- Home medication     4. Medical conditions, other medications, consults  A) Dyslipidemia  - Atorvastatin 10 mg QHS    5. Psychosocial interventions  - Family contacted: DIOGO

## 2022-05-19 NOTE — BH INPATIENT PSYCHIATRY ASSESSMENT NOTE - CURRENT MEDICATION
MEDICATIONS  (STANDING):  atorvastatin 10 milliGRAM(s) Oral at bedtime  DULoxetine 120 milliGRAM(s) Oral daily  loratadine 10 milliGRAM(s) Oral daily  prazosin. 4 milliGRAM(s) Oral at bedtime    MEDICATIONS  (PRN):  hydrOXYzine hydrochloride 25 milliGRAM(s) Oral every 6 hours PRN anxiety  LORazepam     Tablet 1 milliGRAM(s) Oral every 6 hours PRN anxiety or agitation  LORazepam   Injectable 1 milliGRAM(s) IntraMuscular once PRN agitation

## 2022-05-19 NOTE — BH INPATIENT PSYCHIATRY ASSESSMENT NOTE - NSBHMETABOLIC_PSY_ALL_CORE_FT
BMI: BMI (kg/m2): 27.3 (05-18-22 @ 23:30)  HbA1c: A1C with Estimated Average Glucose Result: 4.6 % (04-19-22 @ 11:13)    Glucose: POCT Blood Glucose.: 122 mg/dL (04-17-22 @ 16:05)    BP: 117/66 (05-19-22 @ 07:50) (117/66 - 117/66)  Lipid Panel: Date/Time: 04-23-22 @ 07:02  Cholesterol, Serum: 140  Direct LDL: --  HDL Cholesterol, Serum: 63  Total Cholesterol/HDL Ration Measurement: --  Triglycerides, Serum: 147

## 2022-05-19 NOTE — BH PATIENT PROFILE - HOME MEDICATIONS
Cymbalta 60 mg oral delayed release capsule , 2 cap(s) orally once a day  prazosin 2 mg oral capsule , 2 cap(s) orally once a day (at bedtime)  atorvastatin 10 mg oral tablet , 1 tab(s) orally once a day (at bedtime)  loratadine 10 mg oral tablet , 1 tab(s) orally once a day  Vestura 3 mg-0.02 mg oral tablet , 1 tab(s) orally once a day

## 2022-05-19 NOTE — BH INPATIENT PSYCHIATRY ASSESSMENT NOTE - OTHER PAST PSYCHIATRIC HISTORY (INCLUDE DETAILS REGARDING ONSET, COURSE OF ILLNESS, INPATIENT/OUTPATIENT TREATMENT)
1 prior hospitalization at  at [facility] 2/28-3/9, outpatient care under Dr. Morataya for past 3 years, therapist Aida Martines

## 2022-05-19 NOTE — BH PATIENT PROFILE - FALL HARM RISK - UNIVERSAL INTERVENTIONS
Bed in lowest position, wheels locked, appropriate side rails in place/Call bell, personal items and telephone in reach/Instruct patient to call for assistance before getting out of bed or chair/Non-slip footwear when patient is out of bed/[place] to call system/Physically safe environment - no spills, clutter or unnecessary equipment/Purposeful Proactive Rounding/Room/bathroom lighting operational, light cord in reach

## 2022-05-19 NOTE — BH INPATIENT PSYCHIATRY ASSESSMENT NOTE - NSBHCHARTREVIEWVS_PSY_A_CORE FT
Vital Signs Last 24 Hrs  T(C): 36.7 (05-19-22 @ 07:50), Max: 36.7 (05-18-22 @ 23:30)  T(F): 98 (05-19-22 @ 07:50), Max: 98 (05-18-22 @ 23:30)  HR: 100 (05-19-22 @ 07:50) (100 - 100)  BP: 117/66 (05-19-22 @ 07:50) (117/66 - 117/66)  BP(mean): --  RR: 18 (05-18-22 @ 23:30) (18 - 18)  SpO2: --    Orthostatic VS  05-18-22 @ 23:30  Lying BP: --/-- HR: --  Sitting BP: 115/77 HR: 95  Standing BP: 108/66 HR: 99  Site: --  Mode: --

## 2022-05-19 NOTE — BH SOCIAL WORK INITIAL PSYCHOSOCIAL EVALUATION - OTHER PAST PSYCHIATRIC HISTORY (INCLUDE DETAILS REGARDING ONSET, COURSE OF ILLNESS, INPATIENT/OUTPATIENT TREATMENT)
1 prior hospitalization at  at [facility] 2/28-3/9, outpatient care under Dr. Morataya for past 3 years, therapist Aida Martines

## 2022-05-19 NOTE — BH INPATIENT PSYCHIATRY ASSESSMENT NOTE - NSPRESENTSXS_PSY_ALL_CORE
----- Message from Verónica Cardoso [phone]  
Called and spoke to pt given instructions per Veda TRENT.   She voiced understanding.    
Hold metoprolol today. Restart tomorrow at 12.5mg BID if BP >110/60. If continues to run low or becomes significantly symptomatic, such as dizzy, almost passing out, etc, then she needs to go to the ER.   
Depressed mood/Anhedonia/Hopelessness or despair/Severe anxiety, agitation or panic

## 2022-05-20 PROCEDURE — 99231 SBSQ HOSP IP/OBS SF/LOW 25: CPT

## 2022-05-20 RX ADMIN — DULOXETINE HYDROCHLORIDE 120 MILLIGRAM(S): 30 CAPSULE, DELAYED RELEASE ORAL at 09:19

## 2022-05-20 RX ADMIN — Medication 4 MILLIGRAM(S): at 20:39

## 2022-05-20 RX ADMIN — LORATADINE 10 MILLIGRAM(S): 10 TABLET ORAL at 09:19

## 2022-05-20 RX ADMIN — ATORVASTATIN CALCIUM 10 MILLIGRAM(S): 80 TABLET, FILM COATED ORAL at 20:39

## 2022-05-21 PROCEDURE — 99231 SBSQ HOSP IP/OBS SF/LOW 25: CPT

## 2022-05-21 RX ADMIN — ATORVASTATIN CALCIUM 10 MILLIGRAM(S): 80 TABLET, FILM COATED ORAL at 20:58

## 2022-05-21 RX ADMIN — Medication 4 MILLIGRAM(S): at 20:57

## 2022-05-21 RX ADMIN — DULOXETINE HYDROCHLORIDE 120 MILLIGRAM(S): 30 CAPSULE, DELAYED RELEASE ORAL at 08:46

## 2022-05-21 RX ADMIN — Medication 1 MILLIGRAM(S): at 10:15

## 2022-05-21 RX ADMIN — LORATADINE 10 MILLIGRAM(S): 10 TABLET ORAL at 08:45

## 2022-05-22 PROCEDURE — 99231 SBSQ HOSP IP/OBS SF/LOW 25: CPT

## 2022-05-22 RX ADMIN — DULOXETINE HYDROCHLORIDE 120 MILLIGRAM(S): 30 CAPSULE, DELAYED RELEASE ORAL at 09:57

## 2022-05-22 RX ADMIN — ATORVASTATIN CALCIUM 10 MILLIGRAM(S): 80 TABLET, FILM COATED ORAL at 20:56

## 2022-05-22 RX ADMIN — Medication 25 MILLIGRAM(S): at 11:49

## 2022-05-22 RX ADMIN — Medication 4 MILLIGRAM(S): at 20:56

## 2022-05-22 RX ADMIN — LORATADINE 10 MILLIGRAM(S): 10 TABLET ORAL at 09:57

## 2022-05-23 PROCEDURE — 90853 GROUP PSYCHOTHERAPY: CPT | Mod: 95

## 2022-05-23 RX ORDER — ACETAMINOPHEN 500 MG
650 TABLET ORAL ONCE
Refills: 0 | Status: COMPLETED | OUTPATIENT
Start: 2022-05-23 | End: 2022-05-23

## 2022-05-23 RX ADMIN — Medication 650 MILLIGRAM(S): at 16:52

## 2022-05-23 RX ADMIN — LORATADINE 10 MILLIGRAM(S): 10 TABLET ORAL at 08:12

## 2022-05-23 RX ADMIN — Medication 4 MILLIGRAM(S): at 21:20

## 2022-05-23 RX ADMIN — DULOXETINE HYDROCHLORIDE 120 MILLIGRAM(S): 30 CAPSULE, DELAYED RELEASE ORAL at 08:12

## 2022-05-23 RX ADMIN — ATORVASTATIN CALCIUM 10 MILLIGRAM(S): 80 TABLET, FILM COATED ORAL at 21:20

## 2022-05-24 PROCEDURE — 99232 SBSQ HOSP IP/OBS MODERATE 35: CPT

## 2022-05-24 PROCEDURE — 90853 GROUP PSYCHOTHERAPY: CPT

## 2022-05-24 RX ORDER — PRAZOSIN HCL 2 MG
1 CAPSULE ORAL DAILY
Refills: 0 | Status: DISCONTINUED | OUTPATIENT
Start: 2022-05-24 | End: 2022-05-26

## 2022-05-24 RX ORDER — ACETAMINOPHEN 500 MG
650 TABLET ORAL EVERY 6 HOURS
Refills: 0 | Status: DISCONTINUED | OUTPATIENT
Start: 2022-05-24 | End: 2022-06-07

## 2022-05-24 RX ADMIN — Medication 4 MILLIGRAM(S): at 20:43

## 2022-05-24 RX ADMIN — LORATADINE 10 MILLIGRAM(S): 10 TABLET ORAL at 08:43

## 2022-05-24 RX ADMIN — DULOXETINE HYDROCHLORIDE 120 MILLIGRAM(S): 30 CAPSULE, DELAYED RELEASE ORAL at 08:43

## 2022-05-24 RX ADMIN — ATORVASTATIN CALCIUM 10 MILLIGRAM(S): 80 TABLET, FILM COATED ORAL at 20:43

## 2022-05-25 RX ADMIN — ATORVASTATIN CALCIUM 10 MILLIGRAM(S): 80 TABLET, FILM COATED ORAL at 21:05

## 2022-05-25 RX ADMIN — LORATADINE 10 MILLIGRAM(S): 10 TABLET ORAL at 09:24

## 2022-05-25 RX ADMIN — Medication 4 MILLIGRAM(S): at 21:05

## 2022-05-25 RX ADMIN — DULOXETINE HYDROCHLORIDE 120 MILLIGRAM(S): 30 CAPSULE, DELAYED RELEASE ORAL at 09:23

## 2022-05-25 RX ADMIN — Medication 1 MILLIGRAM(S): at 09:24

## 2022-05-26 PROCEDURE — 90853 GROUP PSYCHOTHERAPY: CPT

## 2022-05-26 RX ORDER — PRAZOSIN HCL 2 MG
1 CAPSULE ORAL
Refills: 0 | Status: DISCONTINUED | OUTPATIENT
Start: 2022-05-26 | End: 2022-06-03

## 2022-05-26 RX ORDER — CALCIUM CARBONATE 500(1250)
1 TABLET ORAL
Refills: 0 | Status: DISCONTINUED | OUTPATIENT
Start: 2022-05-26 | End: 2022-05-27

## 2022-05-26 RX ADMIN — ATORVASTATIN CALCIUM 10 MILLIGRAM(S): 80 TABLET, FILM COATED ORAL at 20:56

## 2022-05-26 RX ADMIN — LORATADINE 10 MILLIGRAM(S): 10 TABLET ORAL at 09:43

## 2022-05-26 RX ADMIN — DULOXETINE HYDROCHLORIDE 120 MILLIGRAM(S): 30 CAPSULE, DELAYED RELEASE ORAL at 09:44

## 2022-05-26 RX ADMIN — Medication 1 MILLIGRAM(S): at 09:43

## 2022-05-26 RX ADMIN — Medication 4 MILLIGRAM(S): at 20:56

## 2022-05-27 PROCEDURE — 90853 GROUP PSYCHOTHERAPY: CPT

## 2022-05-27 RX ORDER — CALCIUM CARBONATE 500(1250)
1 TABLET ORAL
Refills: 0 | Status: DISCONTINUED | OUTPATIENT
Start: 2022-05-27 | End: 2022-06-07

## 2022-05-27 RX ORDER — PETROLATUM,WHITE
1 JELLY (GRAM) TOPICAL
Refills: 0 | Status: DISCONTINUED | OUTPATIENT
Start: 2022-05-27 | End: 2022-06-07

## 2022-05-27 RX ADMIN — Medication 4 MILLIGRAM(S): at 20:18

## 2022-05-27 RX ADMIN — Medication 1 APPLICATION(S): at 20:37

## 2022-05-27 RX ADMIN — ATORVASTATIN CALCIUM 10 MILLIGRAM(S): 80 TABLET, FILM COATED ORAL at 20:18

## 2022-05-27 RX ADMIN — LORATADINE 10 MILLIGRAM(S): 10 TABLET ORAL at 09:08

## 2022-05-27 RX ADMIN — Medication 1 MILLIGRAM(S): at 09:08

## 2022-05-27 RX ADMIN — DULOXETINE HYDROCHLORIDE 120 MILLIGRAM(S): 30 CAPSULE, DELAYED RELEASE ORAL at 09:08

## 2022-05-28 RX ADMIN — Medication 1 MILLIGRAM(S): at 08:37

## 2022-05-28 RX ADMIN — LORATADINE 10 MILLIGRAM(S): 10 TABLET ORAL at 08:36

## 2022-05-28 RX ADMIN — DULOXETINE HYDROCHLORIDE 120 MILLIGRAM(S): 30 CAPSULE, DELAYED RELEASE ORAL at 08:36

## 2022-05-28 RX ADMIN — Medication 4 MILLIGRAM(S): at 21:06

## 2022-05-28 RX ADMIN — ATORVASTATIN CALCIUM 10 MILLIGRAM(S): 80 TABLET, FILM COATED ORAL at 21:06

## 2022-05-28 RX ADMIN — Medication 1 APPLICATION(S): at 10:15

## 2022-05-29 RX ADMIN — Medication 4 MILLIGRAM(S): at 21:59

## 2022-05-29 RX ADMIN — DULOXETINE HYDROCHLORIDE 120 MILLIGRAM(S): 30 CAPSULE, DELAYED RELEASE ORAL at 09:22

## 2022-05-29 RX ADMIN — Medication 1 MILLIGRAM(S): at 09:24

## 2022-05-29 RX ADMIN — LORATADINE 10 MILLIGRAM(S): 10 TABLET ORAL at 09:21

## 2022-05-29 RX ADMIN — ATORVASTATIN CALCIUM 10 MILLIGRAM(S): 80 TABLET, FILM COATED ORAL at 21:59

## 2022-05-30 RX ADMIN — ATORVASTATIN CALCIUM 10 MILLIGRAM(S): 80 TABLET, FILM COATED ORAL at 20:22

## 2022-05-30 RX ADMIN — LORATADINE 10 MILLIGRAM(S): 10 TABLET ORAL at 08:35

## 2022-05-30 RX ADMIN — Medication 4 MILLIGRAM(S): at 20:22

## 2022-05-30 RX ADMIN — DULOXETINE HYDROCHLORIDE 120 MILLIGRAM(S): 30 CAPSULE, DELAYED RELEASE ORAL at 08:35

## 2022-05-30 RX ADMIN — Medication 1 MILLIGRAM(S): at 08:35

## 2022-05-31 PROCEDURE — 99231 SBSQ HOSP IP/OBS SF/LOW 25: CPT

## 2022-05-31 PROCEDURE — 90853 GROUP PSYCHOTHERAPY: CPT

## 2022-05-31 RX ADMIN — DULOXETINE HYDROCHLORIDE 120 MILLIGRAM(S): 30 CAPSULE, DELAYED RELEASE ORAL at 08:44

## 2022-05-31 RX ADMIN — Medication 1 MILLIGRAM(S): at 08:44

## 2022-05-31 RX ADMIN — Medication 4 MILLIGRAM(S): at 21:17

## 2022-05-31 RX ADMIN — LORATADINE 10 MILLIGRAM(S): 10 TABLET ORAL at 08:44

## 2022-05-31 RX ADMIN — ATORVASTATIN CALCIUM 10 MILLIGRAM(S): 80 TABLET, FILM COATED ORAL at 21:17

## 2022-05-31 NOTE — BH PSYCHOLOGY - GROUP THERAPY NOTE - PROVIDER ATTESTATION
I was present with the psychology trainee during the critical/key portions of the visit. I agree with the findings and plan as documented in the psychology trainee note unless noted below.
I was present with the psychology trainee during the critical/key portions of the visit. I agree with the findings and plan as documented in the psychology trainee note unless noted below.

## 2022-06-01 PROCEDURE — 90853 GROUP PSYCHOTHERAPY: CPT

## 2022-06-01 PROCEDURE — 99231 SBSQ HOSP IP/OBS SF/LOW 25: CPT | Mod: 25

## 2022-06-01 RX ADMIN — Medication 25 MILLIGRAM(S): at 21:35

## 2022-06-01 RX ADMIN — Medication 4 MILLIGRAM(S): at 21:35

## 2022-06-01 RX ADMIN — ATORVASTATIN CALCIUM 10 MILLIGRAM(S): 80 TABLET, FILM COATED ORAL at 21:35

## 2022-06-01 RX ADMIN — Medication 1 MILLIGRAM(S): at 09:54

## 2022-06-01 RX ADMIN — Medication 1 TABLET(S): at 15:50

## 2022-06-02 RX ADMIN — DULOXETINE HYDROCHLORIDE 120 MILLIGRAM(S): 30 CAPSULE, DELAYED RELEASE ORAL at 08:16

## 2022-06-02 RX ADMIN — LORATADINE 10 MILLIGRAM(S): 10 TABLET ORAL at 08:16

## 2022-06-02 RX ADMIN — ATORVASTATIN CALCIUM 10 MILLIGRAM(S): 80 TABLET, FILM COATED ORAL at 21:28

## 2022-06-02 RX ADMIN — Medication 4 MILLIGRAM(S): at 21:28

## 2022-06-02 RX ADMIN — Medication 1 MILLIGRAM(S): at 08:16

## 2022-06-02 NOTE — BH TREATMENT PLAN - NSTXPLANTHERAPYSESSIONSFT_PSY_ALL_CORE
05-26-22  Type of therapy: Cognitive behavior therapy,Dialectical behavior therapy,Coping skills,Creative arts therapy,Leisure development,Mindfulness,Music therapy,Peer advocate,Pet therapy  Type of session: Individual  Level of patient participation: Engaged  Duration of participation: 30 minutes  Therapy conducted by: Psych rehab  Therapy Summary: Writer met with patient for an individual session in order to review progress towards psychiatric rehabilitation goals. Patient was verbal and forthcoming during session. Patient is engaged in unit activities. Patient was not a behavioral management issue during past 7 days.       Patient has attended approximately 98 percent of psychiatric rehabilitation groups over the past seven days. Patient has demonstrated improvement in mood. Patient reports decrease in resistance to treatment. Patient reports she has been feeling good overall. Patient denies SI. Patient reports she continues to feel anxious from time to time. Patient reports the exposure therapy has been helping her. Patient also reports feeling frustrated because she does not think being at the hospital is beneficial anymore. Patient reports she would like to continue her treatment outpatient. Writer explored coping skills with patient. Patient reports coping skills such as painting, talking to others, and utilizing DBT skills. Writer encouraged patient to continue to explore and utilize effective and healthy coping skills. Patient was receptive. Patient reports medication compliance over past 7 days. Patient is visible on the unit. Patient has been appropriate with peers and staff.  
  05-26-22  Type of therapy: Cognitive behavior therapy,Dialectical behavior therapy,Coping skills,Creative arts therapy,Leisure development,Mindfulness,Music therapy,Peer advocate,Pet therapy  Type of session: Individual  Level of patient participation: Engaged  Duration of participation: 30 minutes  Therapy conducted by: Psych rehab  Therapy Summary: Writer met with patient for an individual session in order to review progress towards psychiatric rehabilitation goals. Patient was verbal and forthcoming during session. Patient is engaged in unit activities. Patient was not a behavioral management issue during past 7 days.       Patient has attended approximately 98 percent of psychiatric rehabilitation groups over the past seven days. Patient has demonstrated improvement in mood. Patient reports decrease in resistance to treatment. Patient reports she has been feeling good overall. Patient denies SI. Patient reports she continues to feel anxious from time to time. Patient reports the exposure therapy has been helping her. Patient also reports feeling frustrated because she does not think being at the hospital is beneficial anymore. Patient reports she would like to continue her treatment outpatient. Writer explored coping skills with patient. Patient reports coping skills such as painting, talking to others, and utilizing DBT skills. Writer encouraged patient to continue to explore and utilize effective and healthy coping skills. Patient was receptive. Patient reports medication compliance over past 7 days. Patient is visible on the unit. Patient has been appropriate with peers and staff.    06-02-22  Type of therapy: Cognitive behavior therapy,Dialectical behavior therapy,Coping skills,Creative arts therapy,Leisure development,Mindfulness,Music therapy,Peer advocate  Type of session: Individual  Level of patient participation: Engaged  Duration of participation: 30 minutes  Therapy conducted by: Psych rehab  Therapy Summary: Writer met with patient for an individual session in order to review progress towards psychiatric rehabilitation goals. Patient was verbal and forthcoming during session. Patient is engaged in unit activities. Patient was not a behavioral management issue during past 7 days.     Patient has attended approximately 98 percent of psychiatric rehabilitation groups over the past seven days. Patient has demonstrated improvement in mood. Patient reports she has been feeling good overall. Patient reports last night she had a difficult time sleeping due to nightmares. Patient reports feeling better this morning. Patient reports she is ready to go home, and reports feeling hopeful about her future. Ptient reports she is looking forward to continuing treatment outpatient. Writer explored coping skills with patient. Patient reports utilizing distraction skills, listening to music, TIPP skills, and joann outside in order to reduce uncomfortable emotions. Writer encouraged patient to continue to explore, utilize, and strenghten effective and healthy coping skills for improved symptom management and sustained recovery. Patient denies SI. Patient reports medication compliance over the past 7 days. Patient is visible on the unit. Patient has been appropriate with peers and staff.

## 2022-06-02 NOTE — BH TREATMENT PLAN - NSTXANXINTERMD_PSY_ALL_CORE
Psychopharmacology  DBT Skills  Psychoeducation

## 2022-06-02 NOTE — BH TREATMENT PLAN - NSPTSTATEDGOAL_PSY_ALL_CORE
I really do not know what to do to get out of this.

## 2022-06-02 NOTE — BH TREATMENT PLAN - NSTXDEPRESINTERRN_PSY_ALL_CORE
Monitor mood and behavior. Encourage pt to verbalize feelings and concerns with staff. Provide safe and supportive environment.

## 2022-06-02 NOTE — BH TREATMENT PLAN - NSTXDEPRESGOAL_PSY_ALL_CORE
Report journaling 5 counter-statements to negative predictions/self-image daily

## 2022-06-02 NOTE — BH TREATMENT PLAN - NSTXANXINTERPR_PSY_ALL_CORE
Id and practise 3 coping skills to manage her anxiety/OCD.
Psychiatric rehabilitation staff will continue to meet patient individually to provide support, encouragement, and counseling in order for patient to attend daily symptom management groups and identify 3 healthy coping skills for improved symptom management and sustained recovery over seven days.
Psychiatric rehabilitation staff will continue to meet patient individually to provide support, encouragement, and counseling in order for patient to attend daily symptom management groups and identify 3 healthy coping skills for improved symptom management and sustained recovery over seven days.

## 2022-06-02 NOTE — BH TREATMENT PLAN - NSBHPRIMARYDX_PSY_ALL_CORE
MDD (major depressive disorder)    

## 2022-06-03 PROCEDURE — 99231 SBSQ HOSP IP/OBS SF/LOW 25: CPT

## 2022-06-03 PROCEDURE — 90853 GROUP PSYCHOTHERAPY: CPT

## 2022-06-03 RX ORDER — PRAZOSIN HCL 2 MG
1 CAPSULE ORAL DAILY
Refills: 0 | Status: DISCONTINUED | OUTPATIENT
Start: 2022-06-04 | End: 2022-06-07

## 2022-06-03 RX ADMIN — ATORVASTATIN CALCIUM 10 MILLIGRAM(S): 80 TABLET, FILM COATED ORAL at 21:29

## 2022-06-03 RX ADMIN — Medication 4 MILLIGRAM(S): at 21:29

## 2022-06-03 RX ADMIN — Medication 1 MILLIGRAM(S): at 08:37

## 2022-06-03 RX ADMIN — DULOXETINE HYDROCHLORIDE 120 MILLIGRAM(S): 30 CAPSULE, DELAYED RELEASE ORAL at 08:37

## 2022-06-03 RX ADMIN — LORATADINE 10 MILLIGRAM(S): 10 TABLET ORAL at 08:37

## 2022-06-03 NOTE — BH PSYCHOLOGY - GROUP THERAPY NOTE - NSBHPSYCHOLPARTICIP_PSY_A_CORE
Fully engaged
Partially engaged
Fully engaged

## 2022-06-03 NOTE — BH PSYCHOLOGY - GROUP THERAPY NOTE - NSPSYCHOLGRPDBTPROB_PSY_A_CORE
depressed mood/suicidal ideation

## 2022-06-03 NOTE — BH PSYCHOLOGY - GROUP THERAPY NOTE - NSPSYCHOLGRPDBTPT_PSY_A_CORE FT
DBT Group is a group in which patients learn skills to better manage their emotions and behaviors. Group begins with a mindfulness practice so that patients have an opportunity to practice observing their internal and external experiences.  Following the mindfulness exercise the group learns new skills in a didactic format. Group today focused on the “distress tolerance” module.  Specifically, patients learned the skills of “STOP,” which teaches patients to pause and think before acting on emotions, and “pros and cons,” which is a way to objectively look at impulsive and destructive behaviors and see the short term and long term consequences of them. Patients were guided through an example of pros and cons provided by the writer as well as scenarios where the STOP skill is relevant, and were encouraged to complete their own pros and cons and STOP skills for homework.  
DBT Group is a group in which patients learn skills to better manage their emotions and behaviors. Group begins with a mindfulness practice so that patients have an opportunity to practice observing their internal and external experiences.  Following the mindfulness exercise the group learns new skills in a didactic format. Group today focused on the “emotion regulation” module.  Specifically, patients learned Build Mastery and Dorset Ahead and Dorset Ahead. Patients were asked to identify an activity to engage in every day that would help increase their sense of competence and accomplishment (Build Mastery). They were also asked to identify potential difficult situations, identify skills to help manage these difficulties, and imagine coping effectively (Dorset Ahead).
DBT Group is a group in which patients learn skills to better manage their emotions and behaviors. Group begins with a mindfulness practice so that patients have an opportunity to practice observing their internal and external experiences.  Following the mindfulness exercise the group learns new skills in a didactic format. Group today focused on the “emotion regulation” module.  Specifically, patients learned the skill of Accumulating Positive Emotions in the Long Term by identifying values and translating those into goals and manageable action steps. Patients shared values that are important to them and how these translate into goals, as well as how they’ve begun to implement these.
DBT skills generalization group is a group in which patients review the skill taught the day before, and patients have the opportunity to troubleshoot the skill, engage in more practice, and share their experience using the skill. Today’s skills review group focused on the skills of “radical acceptance” and "willingness" which include accepting painful situations in their lives they cannot change through turning the mind and practicing engaging in reality effectively. Patients were asked to determine difficult situations in their lives they needed to work on accepting, and provided examples of ways to practice this while in the hospital.  
DBT Group is a group in which patients learn skills to better manage their emotions and behaviors. Group begins with a mindfulness practice so that patients have an opportunity to practice observing their internal and external experiences.  Following the mindfulness exercise the group learns new skills in a didactic format. Group today focused on the “distress tolerance” module, which are skills to help patients manage their crisis urges.  Specifically, pts learned the skill of “radical acceptance,” which includes accepting painful situations in their lives they cannot change through turning the mind and practicing willingness. Patients were asked to determine difficult situations in their lives they needed to work on accepting, and provided examples of ways to practice this while in the hospital. 
DBT Group is a group in which patients learn skills to better manage their emotions and behaviors. Group begins with a mindfulness practice so that patients have an opportunity to practice observing their internal and external experiences.  Following the mindfulness exercise the group learns new skills in a didactic format. Group today focused on the “emotion regulation” module.  Specifically, patients learned the skills of “PLEASE,” or taking care of the mind by taking care of the body. This skill involves maintaining consistent treatment for physical illness, balanced eating, avoidance of mood-altering substances, balanced sleep, and exercise.  provided examples and suggestions of ways to improve these areas, and patients were encouraged to engage in discussion of ways they can prioritize and implement this skill.
DBT skills generalization group is a group in which patients review the skill taught the day before, and patients have the opportunity to troubleshoot the skill, engage in more practice, and share their experience using the skill. Today’s skills review group focused on the skill of Accumulating Positive Emotions in the Long Term by identifying values and translating those into goals and manageable action steps. Patients shared values that are important to them and how these translate into goals, as well as how they’ve begun to implement these. 
DBT Group is a group in which patients learn skills to better manage their emotions and behaviors. Group today focused on the “mindfulness” module, which are skills to help patients increase their awareness of their present experience without judgment. Pts were taught the “what” skills (observe, describe, participate) and “how” skills (non-judgmentally, effectively, and one-mindfully) of mindfulness, and engaged in a variety of mindfulness exercises to practice the skills, and shared observations at the end of each activity.

## 2022-06-03 NOTE — BH PSYCHOLOGY - GROUP THERAPY NOTE - NSBHPSYCHOLGRPTYPE_PSY_A_CORE
DBT Life Skills
General Group Therapy

## 2022-06-03 NOTE — BH PSYCHOLOGY - GROUP THERAPY NOTE - NSBHPSYCHOLASSESSPROV_PSY_A_CORE
Licensed Psychologist and Psychology Trainee
Licensed Psychologist and Psychology Trainee
Licensed Psychologist
Psychology Trainee only

## 2022-06-03 NOTE — BH PSYCHOLOGY - GROUP THERAPY NOTE - NSBHPTASSESSDT_PSY_A_CORE
31-May-2022 11:00
27-May-2022 11:15
01-Jun-2022 09:15
03-Jun-2022 11:00
24-May-2022 11:00
02-Jun-2022 15:15
23-May-2022 11:15
25-May-2022 09:15
26-May-2022 12:52

## 2022-06-03 NOTE — BH PSYCHOLOGY - GROUP THERAPY NOTE - NSBHPSYCHOLRESPONSE_PSY_A_CORE
Accepted support
Coping skills acquired/Accepted support
Insight displayed/Accepted support
Accepted support
Insight displayed/Accepted support

## 2022-06-03 NOTE — BH PSYCHOLOGY - GROUP THERAPY NOTE - NSPSYCHOLGRPDBTGOAL_PSY_A_CORE
Covering provider Araceli MATOS did send for the refill yesterday.   
reduce mood and affective lability/reduce suicidal ideation/risk of attempt/improve ability to indentify feelings/improve ability to communicate feelings/reduce vulnerability to emotional dysregualation
reduce mood and affective lability/improve ability to indentify feelings/improve ability to communicate feelings/reduce vulnerability to emotional dysregualation
reduce mood and affective lability/reduce suicidal ideation/risk of attempt/improve ability to indentify feelings/improve ability to communicate feelings/reduce vulnerability to emotional dysregualation

## 2022-06-03 NOTE — BH PSYCHOLOGY - GROUP THERAPY NOTE - NSPSYCHOLGRPDBTINT_PSY_A_CORE FT
taught Distress Tolerance Skills of Pros and Cons and STOP
taught distress tolerance skill 
taught emotion regulation skill 
taught emotion regulation skill 
taught mindfulness skill 
taught emotion regulation skill

## 2022-06-03 NOTE — BH PSYCHOLOGY - GROUP THERAPY NOTE - NSPSYCHOLGRPDBTINT_PSY_A_CORE
other...
reviewed distress tolerance, skills homework
review emotion regulation homework
other...

## 2022-06-03 NOTE — BH PSYCHOLOGY - GROUP THERAPY NOTE - TOKEN PULL-DIAGNOSIS
Primary Diagnosis:  MDD (major depressive disorder) [F32.9]        Problem Dx:   

## 2022-06-03 NOTE — BH PSYCHOLOGY - GROUP THERAPY NOTE - NSPSYCHOLGRPBILLING_PSY_A_CORE
[id number] - Group Psychotherapy
12053 - Group Psychotherapy
27014 - Group Psychotherapy
30851 - Group Psychotherapy
41149 - Group Psychotherapy
64808 - Group Psychotherapy
09054 - Group Psychotherapy
52015 - Group Psychotherapy

## 2022-06-03 NOTE — BH PSYCHOLOGY - GROUP THERAPY NOTE - NSBHPSYCHOLGRPNAME_PSY_A_CORE
DBT Homework
DBT Skills
Developing Social Supports
DBT Skills
DBT Homework
DBT Skills
DBT Skills

## 2022-06-04 RX ADMIN — DULOXETINE HYDROCHLORIDE 120 MILLIGRAM(S): 30 CAPSULE, DELAYED RELEASE ORAL at 09:02

## 2022-06-04 RX ADMIN — ATORVASTATIN CALCIUM 10 MILLIGRAM(S): 80 TABLET, FILM COATED ORAL at 20:51

## 2022-06-04 RX ADMIN — LORATADINE 10 MILLIGRAM(S): 10 TABLET ORAL at 09:02

## 2022-06-04 RX ADMIN — Medication 1 MILLIGRAM(S): at 09:02

## 2022-06-04 RX ADMIN — Medication 4 MILLIGRAM(S): at 20:53

## 2022-06-05 RX ADMIN — Medication 1 MILLIGRAM(S): at 09:14

## 2022-06-05 RX ADMIN — Medication 650 MILLIGRAM(S): at 09:13

## 2022-06-05 RX ADMIN — DULOXETINE HYDROCHLORIDE 120 MILLIGRAM(S): 30 CAPSULE, DELAYED RELEASE ORAL at 09:13

## 2022-06-05 RX ADMIN — LORATADINE 10 MILLIGRAM(S): 10 TABLET ORAL at 09:13

## 2022-06-05 RX ADMIN — ATORVASTATIN CALCIUM 10 MILLIGRAM(S): 80 TABLET, FILM COATED ORAL at 20:57

## 2022-06-05 RX ADMIN — Medication 4 MILLIGRAM(S): at 20:57

## 2022-06-06 PROCEDURE — 99238 HOSP IP/OBS DSCHRG MGMT 30/<: CPT

## 2022-06-06 RX ORDER — CALCIUM CARBONATE 500(1250)
1 TABLET ORAL
Qty: 0 | Refills: 0 | DISCHARGE
Start: 2022-06-06

## 2022-06-06 RX ORDER — PETROLATUM,WHITE
1 JELLY (GRAM) TOPICAL
Qty: 0 | Refills: 0 | DISCHARGE
Start: 2022-06-06

## 2022-06-06 RX ORDER — DULOXETINE HYDROCHLORIDE 30 MG/1
2 CAPSULE, DELAYED RELEASE ORAL
Qty: 60 | Refills: 0
Start: 2022-06-06 | End: 2022-07-05

## 2022-06-06 RX ORDER — PRAZOSIN HCL 2 MG
1 CAPSULE ORAL
Qty: 30 | Refills: 0
Start: 2022-06-06 | End: 2022-07-05

## 2022-06-06 RX ORDER — PRAZOSIN HCL 2 MG
2 CAPSULE ORAL
Qty: 60 | Refills: 0
Start: 2022-06-06 | End: 2022-07-05

## 2022-06-06 RX ORDER — IBUPROFEN 200 MG
400 TABLET ORAL
Refills: 0 | Status: DISCONTINUED | OUTPATIENT
Start: 2022-06-06 | End: 2022-06-07

## 2022-06-06 RX ADMIN — DULOXETINE HYDROCHLORIDE 120 MILLIGRAM(S): 30 CAPSULE, DELAYED RELEASE ORAL at 08:40

## 2022-06-06 RX ADMIN — Medication 650 MILLIGRAM(S): at 13:09

## 2022-06-06 RX ADMIN — Medication 4 MILLIGRAM(S): at 21:19

## 2022-06-06 RX ADMIN — ATORVASTATIN CALCIUM 10 MILLIGRAM(S): 80 TABLET, FILM COATED ORAL at 21:19

## 2022-06-06 RX ADMIN — Medication 400 MILLIGRAM(S): at 16:28

## 2022-06-06 RX ADMIN — Medication 400 MILLIGRAM(S): at 13:19

## 2022-06-06 RX ADMIN — Medication 25 MILLIGRAM(S): at 22:41

## 2022-06-06 RX ADMIN — LORATADINE 10 MILLIGRAM(S): 10 TABLET ORAL at 08:39

## 2022-06-06 RX ADMIN — Medication 1 MILLIGRAM(S): at 08:40

## 2022-06-06 RX ADMIN — Medication 650 MILLIGRAM(S): at 09:12

## 2022-06-06 NOTE — BH DISCHARGE NOTE NURSING/SOCIAL WORK/PSYCH REHAB - PATIENT PORTAL LINK FT
You can access the FollowMyHealth Patient Portal offered by [facility] by registering at the following website: [email or web address]. By joining [facility]’s FollowMyHealth portal, you will also be able to view your health information using other applications (apps) compatible with our system.

## 2022-06-06 NOTE — BH DISCHARGE NOTE NURSING/SOCIAL WORK/PSYCH REHAB - DISCHARGE INSTRUCTIONS AFTERCARE APPOINTMENTS
In order to check the location, date, or time of your aftercare appointment, please refer to your Discharge Instructions Document given to you upon leaving the hospital.  If you have lost the instructions please call [phone]

## 2022-06-06 NOTE — BH DISCHARGE NOTE NURSING/SOCIAL WORK/PSYCH REHAB - NSDCADDINFO1FT_PSY_ALL_CORE
Please be informed that your discharge summary will be faxed to your providers as soon as it becomes available.

## 2022-06-06 NOTE — BH DISCHARGE NOTE NURSING/SOCIAL WORK/PSYCH REHAB - NSDCPRRECOMMEND_PSY_ALL_CORE
Psychiatric rehabilitation staff recommends patient will benefit from attending [facility] for medication management, support, and psychotherapy

## 2022-06-06 NOTE — BH INPATIENT PSYCHIATRY DISCHARGE NOTE - OTHER PAST PSYCHIATRIC HISTORY (INCLUDE DETAILS REGARDING ONSET, COURSE OF ILLNESS, INPATIENT/OUTPATIENT TREATMENT)
1 prior hospitalization at  at [facility] 2/28-3/9, outpatient care under Dr. Morataya for past 3 years, therapist Aida Martines

## 2022-06-06 NOTE — BH INPATIENT PSYCHIATRY DISCHARGE NOTE - NSBHMETABOLIC_PSY_ALL_CORE_FT
BMI: BMI (kg/m2): 27.3 (05-18-22 @ 23:30)  HbA1c: A1C with Estimated Average Glucose Result: 4.6 % (04-19-22 @ 11:13)    Glucose: POCT Blood Glucose.: 122 mg/dL (04-17-22 @ 16:05)    BP: 110/71 (06-06-22 @ 08:24) (109/74 - 116/70)  Lipid Panel: Date/Time: 04-23-22 @ 07:02  Cholesterol, Serum: 140  Direct LDL: --  HDL Cholesterol, Serum: 63  Total Cholesterol/HDL Ration Measurement: --  Triglycerides, Serum: 147

## 2022-06-06 NOTE — BH DISCHARGE NOTE NURSING/SOCIAL WORK/PSYCH REHAB - NSDCPRGOAL_PSY_ALL_CORE
During the current hospitalization, patient has been addressing psychiatric rehabilitation goals pertaining to identifying coping skills to manage anxiety symptoms. Patient has demonstrated progress towards psychiatric rehabilitation goals during the current hospitalization. Patient exhibited progress through participating in individual and group therapy and developing additional coping skills to assist with managing negative emotions such as TIPP skills, going outside for fresh air, listening to music and watching TV. Writer encouraged patient to continue to strengthen and practice effective skills. Patient was receptive. Patient met goal of identifying coping skills by reporting these skills have helped her during current hospitalization. Patient also reports the "mild exposure" in the units was helpful in terms of reducing anxiety symptoms. Patient reports overall improvement in mood. Patient reports she is looking forward to seeing her family, and reports feeling excited about engaging in outpatient treatment. Patient completed safety plan with unit staff. Patient was compliant with medications during current hospitalization. Patient was visible on the unit and was appropriate with peers and staff. Patient was provided with a Press Ganey survey prior to discharge.

## 2022-06-06 NOTE — BH INPATIENT PSYCHIATRY DISCHARGE NOTE - NSBHDCBILLING_PSY_ALL_CORE
92753 (Hospital discharge day management; 30 min or less) Alar Island Pedicle Flap Text: The defect edges were debeveled with a #15 scalpel blade.  Given the location of the defect, shape of the defect and the proximity to the alar rim an island pedicle advancement flap was deemed most appropriate.  Using a sterile surgical marker, an appropriate advancement flap was drawn incorporating the defect, outlining the appropriate donor tissue and placing the expected incisions within the nasal ala running parallel to the alar rim. The area thus outlined was incised with a #15 scalpel blade.  The skin margins were undermined minimally to an appropriate distance in all directions around the primary defect and laterally outward around the island pedicle utilizing iris scissors.  There was minimal undermining beneath the pedicle flap.

## 2022-06-06 NOTE — BH INPATIENT PSYCHIATRY DISCHARGE NOTE - HOSPITAL COURSE
Patient is a 21-year-old female with a history of major depressive disorder, FRANSISCO, and cluster B traits who initially presented for suicidal ideation, expressed to therapist while attending a partial hospitalization program. She was just discharged from this unit two days ago. The patient has been hospitalized thrice this year. The patient has had numerous failed medication trials. The normal course of her illness can be characterized by rapid decompensation, but also rapid improvement after hospitalization. The patient admits that hospitalization does not help her develop effective coping strategies outside the hospital, and she does not feel she needs continued hospitalization. However she meets criteria for hospitalization nonetheless given recent overdoses and failing partial hospitalization level of care.   PsyHx: Sees Dr. Morataya x 3 years. Therapist Aida Martines 1x hospitalization 1 month ago at [facility].  SH: [facility]. Lives with parents.    Patient was started on home medication duloxetine 120 mg daily and prazosin 1 mg daily, 4 mg nightly. Due to her recently hospitalization, no further changes were made.   Risks, benefits, and side effects of all medication prescribed were discussed in detail, including the FDA-issued black box warning relaying the increased risk of suicidality for antidepressants in patients under the age of 24.    Over the course of hospitalization, the patient demonstrated stability and denied SI throughout hospitalization. The treatment team considered the diagnosis of OCD, given her obsessive thoughts of committing suicide, but ultimately we did not feel that this encapsulated her symptomatology. She worked with her inpatient therapist and worked on skills related to distress tolerance and interpersonal effectiveness. By the end of hospitalization she reported that she "no longer saw suicide as an option" and she identified distress tolerance as a continued goal moving forward. She agreed with the plan to participate in the DBT program at [facility], and she was able to complete a safety plan effectively.    No medical issues, restraints, or self-injurious behavior noted during the hospitalization.    At the time of discharge, the patient reported improved mood, exhibited a euthymic affect, and denied SI/HI/AVH or desire to self-harm. The patient was future-oriented with respect to plans to return home. The patient denied any intolerable side effects and agreed with the plan for discharge due to the patient’s confidence that treatment could be successfully continued as an outpatient.    Risk assessment:  On admission, acute risk factors included: Suicidal ideation, major depressive episode, severe anxiety, agitation,  Chronic risk factors included: Diagnosis of borderline personality disorder, past psychiatric hospitalizations, history of suicide attempt, history of suicidal ideation    Acute risk factors were mitigated during hospitalization and overall risk had returned to baseline levels by the time of discharge. However, the patient remains at elevated risk of suicide given chronic risk factors, which would not be reduced further by continued hospitalization and are best managed on an outpatient basis. In addition, the patient has numerous protective factors, including treatment adherence, close involvement of family throughout hospitalization, limited access to lethal means (reported by family and patient), social support, forward thinking regarding school/career, high premorbid functioning. The patient was able to engage in safety planning, and neither the patient nor family identified any barriers to discharge.   Therefore, the patient no longer met criteria for inpatient psychiatric hospitalization and was discharged from the 1S unit.     DSM-5 diagnosis:  Major depressive disorder  Borderline personality disorder    Discharge medication:  - Duloxetine 120 mg daily   - Prazosin 1 mg daily, 4 mg QHS

## 2022-06-06 NOTE — BH INPATIENT PSYCHIATRY DISCHARGE NOTE - NSDCMRMEDTOKEN_GEN_ALL_CORE_FT
atorvastatin 10 mg oral tablet: 1 tab(s) orally once a day (at bedtime)  calcium carbonate 500 mg (200 mg elemental calcium) oral tablet, chewable: 1 tab(s) orally 2 times a day, As needed, Dyspepsia  Cymbalta 60 mg oral delayed release capsule: 2 cap(s) orally once a day  freetext medication     -: 1 tab(s) orally once a day (at bedtime)  freetext medication     -: 1 drop(s) to each affected eye once a day  loratadine 10 mg oral tablet: 1 tab(s) orally once a day  Minipress 2 mg oral capsule: 2 cap(s) orally once a day (at bedtime)  petrolatum topical ointment: 1 application topically 2 times a day, As needed, anal fisures  prazosin 1 mg oral capsule: 1 cap(s) orally once a day  Vestura 3 mg-0.02 mg oral tablet: 1 tab(s) orally once a day

## 2022-06-06 NOTE — BH DISCHARGE NOTE NURSING/SOCIAL WORK/PSYCH REHAB - NSCDUDCCRISIS_PSY_A_CORE
[facility] Well  1 [phone]) [facility]-WELL ([phone])  Text "WELL" to [phone]  Website: www.[employer]/.[employer] 1 (434) 121-NFOB (9477) Website: www.safehorizon.org/.National Suicide Prevention Lifeline 3 (840) 036-1632/.  [employer]  1 (289) LIFENET (315-5798)/.  [employer]  [address] [place] [address] [phone]   Hours:  Monday through Friday from 9 AM to 3 PM/.  [employer] [employer]  Affairs - Veterans Crisis Line  9 [phone]) [phone], Option 1

## 2022-06-07 VITALS — TEMPERATURE: 98 F | HEART RATE: 113 BPM | DIASTOLIC BLOOD PRESSURE: 69 MMHG | SYSTOLIC BLOOD PRESSURE: 111 MMHG

## 2022-06-07 PROCEDURE — 99231 SBSQ HOSP IP/OBS SF/LOW 25: CPT

## 2022-06-07 RX ADMIN — LORATADINE 10 MILLIGRAM(S): 10 TABLET ORAL at 08:47

## 2022-06-07 RX ADMIN — DULOXETINE HYDROCHLORIDE 120 MILLIGRAM(S): 30 CAPSULE, DELAYED RELEASE ORAL at 08:48

## 2022-06-07 RX ADMIN — Medication 1 MILLIGRAM(S): at 08:48

## 2022-06-07 NOTE — BH INPATIENT PSYCHIATRY PROGRESS NOTE - NSTXDCOPLKDATETRGT_PSY_ALL_CORE
03-Jun-2022
26-May-2022
07-Jun-2022
07-Jun-2022
26-May-2022
07-Jun-2022
03-Jun-2022
26-May-2022
26-May-2022
03-Jun-2022

## 2022-06-07 NOTE — BH INPATIENT PSYCHIATRY PROGRESS NOTE - NSTXANXDATEEST_PSY_ALL_CORE
19-May-2022
19-Jun-2022
19-May-2022
19-Jun-2022
19-May-2022
19-Jun-2022
19-Jun-2022

## 2022-06-07 NOTE — BH PSYCHOLOGY - CLINICIAN PSYCHOTHERAPY NOTE - TOKEN PULL-DIAGNOSIS
Primary Diagnosis:  MDD (major depressive disorder) [F32.9]        Problem Dx:   

## 2022-06-07 NOTE — BH INPATIENT PSYCHIATRY PROGRESS NOTE - NSBHINPTBILLING_PSY_ALL_CORE
66514 - Inpatient Low Complexity
23094 - Inpatient Low Complexity
42037 - Inpatient Low Complexity
85067 - Inpatient Low Complexity
00046 - Inpatient Low Complexity
35396 - Inpatient Low Complexity
98185 - Inpatient Low Complexity
77968 - Inpatient Low Complexity
83226 - Inpatient Moderate Complexity
31550 - Inpatient Low Complexity

## 2022-06-07 NOTE — BH PSYCHOLOGY - CLINICIAN PSYCHOTHERAPY NOTE - NSBHPSYCHOLSERV_PSY_A_CORE
Individual psychotherapy

## 2022-06-07 NOTE — BH INPATIENT PSYCHIATRY PROGRESS NOTE - NSBHMSEKNOWHOW_PSY_ALL_CORE
Educational attainment

## 2022-06-07 NOTE — BH PSYCHOLOGY - CLINICIAN PSYCHOTHERAPY NOTE - NSBHPSYCHOLINT_PSY_A_CORE
Cognitive/behavioral therapy/Dialectical  Behavioral Therapy (DBT)/Supported coping skills

## 2022-06-07 NOTE — BH INPATIENT PSYCHIATRY PROGRESS NOTE - NSTXSUICIDGOAL_PSY_ALL_CORE
Talk to staff and ask for assistance when having suicidal wishes instead of acting out

## 2022-06-07 NOTE — BH INPATIENT PSYCHIATRY PROGRESS NOTE - NSBHCHARTREVIEWVS_PSY_A_CORE FT
Vital Signs Last 24 Hrs  T(C): 36.5 (05-20-22 @ 06:43), Max: 36.5 (05-20-22 @ 06:43)  T(F): 97.7 (05-20-22 @ 06:43), Max: 97.7 (05-20-22 @ 06:43)  HR: 88 (05-20-22 @ 06:43) (88 - 88)  BP: 113/79 (05-20-22 @ 06:43) (113/79 - 113/79)  BP(mean): --  RR: --  SpO2: --    Orthostatic VS  05-18-22 @ 23:30  Lying BP: --/-- HR: --  Sitting BP: 115/77 HR: 95  Standing BP: 108/66 HR: 99  Site: --  Mode: --  
Vital Signs Last 24 Hrs  T(C): 36.9 (06-03-22 @ 08:01), Max: 36.9 (06-02-22 @ 20:58)  T(F): 98.5 (06-03-22 @ 08:01), Max: 98.5 (06-03-22 @ 08:01)  HR: 78 (06-03-22 @ 08:01) (78 - 78)  BP: 103/71 (06-03-22 @ 08:01) (103/71 - 103/71)  BP(mean): --  RR: --  SpO2: --    Orthostatic VS  06-02-22 @ 20:58  Lying BP: --/-- HR: --  Sitting BP: 121/76 HR: 103  Standing BP: --/-- HR: --  Site: --  Mode: --  Orthostatic VS  06-01-22 @ 20:02  Lying BP: --/-- HR: --  Sitting BP: 113/76 HR: 98  Standing BP: --/-- HR: --  Site: --  Mode: --  
Vital Signs Last 24 Hrs  T(C): 36.2 (06-01-22 @ 07:39), Max: 37.2 (05-31-22 @ 20:35)  T(F): 97.1 (06-01-22 @ 07:39), Max: 98.9 (05-31-22 @ 20:35)  HR: 112 (06-01-22 @ 07:39) (112 - 112)  BP: 108/66 (06-01-22 @ 07:39) (108/66 - 108/66)  BP(mean): --  RR: --  SpO2: --    Orthostatic VS  05-31-22 @ 20:35  Lying BP: --/-- HR: --  Sitting BP: --/-- HR: --  Standing BP: 112/64 HR: 114  Site: --  Mode: --  Orthostatic VS  05-30-22 @ 20:13  Lying BP: --/-- HR: --  Sitting BP: 113/73 HR: 92  Standing BP: --/-- HR: --  Site: --  Mode: --  
Vital Signs Last 24 Hrs  T(C): 36.6 (05-21-22 @ 07:55), Max: 37 (05-20-22 @ 20:51)  T(F): 97.8 (05-21-22 @ 07:55), Max: 98.6 (05-20-22 @ 20:51)  HR: 117 (05-21-22 @ 07:55) (117 - 117)  BP: 113/65 (05-21-22 @ 07:55) (113/65 - 113/65)  BP(mean): --  RR: --  SpO2: --    Orthostatic VS  05-20-22 @ 20:51  Lying BP: --/-- HR: --  Sitting BP: 111/73 HR: 99  Standing BP: 112/72 HR: 86  Site: --  Mode: --  
Vital Signs Last 24 Hrs  T(C): 36.6 (06-02-22 @ 07:53), Max: 36.6 (06-02-22 @ 07:53)  T(F): 97.8 (06-02-22 @ 07:53), Max: 97.8 (06-02-22 @ 07:53)  HR: 125 (06-02-22 @ 07:53) (125 - 125)  BP: 112/71 (06-02-22 @ 07:53) (112/71 - 112/71)  BP(mean): --  RR: --  SpO2: --    Orthostatic VS  06-01-22 @ 20:02  Lying BP: --/-- HR: --  Sitting BP: 113/76 HR: 98  Standing BP: --/-- HR: --  Site: --  Mode: --  Orthostatic VS  05-31-22 @ 20:35  Lying BP: --/-- HR: --  Sitting BP: --/-- HR: --  Standing BP: 112/64 HR: 114  Site: --  Mode: --  
Vital Signs Last 24 Hrs  T(C): 36.6 (05-22-22 @ 06:14), Max: 36.7 (05-21-22 @ 20:32)  T(F): 97.9 (05-22-22 @ 06:14), Max: 98.1 (05-21-22 @ 20:32)  HR: 91 (05-22-22 @ 06:14) (91 - 91)  BP: 105/75 (05-22-22 @ 06:14) (105/75 - 105/75)  BP(mean): --  RR: --  SpO2: --    Orthostatic VS  05-21-22 @ 20:32  Lying BP: --/-- HR: --  Sitting BP: 125/71 HR: 112  Standing BP: --/-- HR: --  Site: --  Mode: --  Orthostatic VS  05-20-22 @ 20:51  Lying BP: --/-- HR: --  Sitting BP: 111/73 HR: 99  Standing BP: 112/72 HR: 86  Site: --  Mode: --  
Vital Signs Last 24 Hrs  T(C): 36.4 (05-31-22 @ 08:21), Max: 36.4 (05-31-22 @ 08:21)  T(F): 97.6 (05-31-22 @ 08:21), Max: 97.6 (05-31-22 @ 08:21)  HR: 111 (05-31-22 @ 08:21) (111 - 111)  BP: 113/81 (05-31-22 @ 08:21) (113/81 - 113/81)  BP(mean): --  RR: --  SpO2: --    Orthostatic VS  05-30-22 @ 20:13  Lying BP: --/-- HR: --  Sitting BP: 113/73 HR: 92  Standing BP: --/-- HR: --  Site: --  Mode: --  
Vital Signs Last 24 Hrs  T(C): 36.5 (05-24-22 @ 06:25), Max: 36.5 (05-24-22 @ 06:25)  T(F): 97.7 (05-24-22 @ 06:25), Max: 97.7 (05-24-22 @ 06:25)  HR: 95 (05-24-22 @ 06:25) (95 - 95)  BP: 122/80 (05-24-22 @ 06:25) (122/80 - 122/80)  BP(mean): 88 (05-24-22 @ 06:25) (88 - 88)  RR: --  SpO2: --    
Vital Signs Last 24 Hrs  T(C): 36.7 (06-07-22 @ 06:23), Max: 36.7 (06-07-22 @ 06:23)  T(F): 98 (06-07-22 @ 06:23), Max: 98 (06-07-22 @ 06:23)  HR: 113 (06-07-22 @ 06:23) (97 - 113)  BP: 111/69 (06-07-22 @ 06:23) (111/69 - 132/89)  BP(mean): --  RR: --  SpO2: --    Orthostatic VS  06-05-22 @ 21:12  Lying BP: --/-- HR: --  Sitting BP: 119/77 HR: 102  Standing BP: --/-- HR: --  Site: --  Mode: --  
Vital Signs Last 24 Hrs  T(C): 36.7 (06-06-22 @ 08:24), Max: 37.1 (06-05-22 @ 21:12)  T(F): 98.1 (06-06-22 @ 08:24), Max: 98.8 (06-05-22 @ 21:12)  HR: 100 (06-06-22 @ 08:24) (100 - 100)  BP: 110/71 (06-06-22 @ 08:24) (110/71 - 110/71)  BP(mean): --  RR: --  SpO2: --    Orthostatic VS  06-05-22 @ 21:12  Lying BP: --/-- HR: --  Sitting BP: 119/77 HR: 102  Standing BP: --/-- HR: --  Site: --  Mode: --

## 2022-06-07 NOTE — BH PSYCHOLOGY - CLINICIAN PSYCHOTHERAPY NOTE - NSBHPSYCHOLDISCUSS_PSY_A_CORE
Discussion with collaborating staff took place since patient's last visit

## 2022-06-07 NOTE — BH PSYCHOLOGY - CLINICIAN PSYCHOTHERAPY NOTE - NSBHPSYCHOLGOALS_PSY_A_CORE
Assessment/Improve level of independent functioning/Improve social/vocational/coping skills/Psychoeducation
Cheek Interpolation Flap Text: A decision was made to reconstruct the defect utilizing an interpolation axial flap and a staged reconstruction.  A telfa template was made of the defect.  This telfa template was then used to outline the Cheek Interpolation flap.  The donor area for the pedicle flap was then injected with anesthesia.  The flap was excised through the skin and subcutaneous tissue down to the layer of the underlying musculature.  The interpolation flap was carefully excised within this deep plane to maintain its blood supply.  The edges of the donor site were undermined.   The donor site was closed in a primary fashion.  The pedicle was then rotated into position and sutured.  Once the tube was sutured into place, adequate blood supply was confirmed with blanching and refill.  The pedicle was then wrapped with xeroform gauze and dressed appropriately with a telfa and gauze bandage to ensure continued blood supply and protect the attached pedicle.

## 2022-06-07 NOTE — BH PSYCHOLOGY - CLINICIAN PSYCHOTHERAPY NOTE - NSBHPSYCHOLASSESSPROV_PSY_A_CORE
Psychology Trainee only

## 2022-06-07 NOTE — BH INPATIENT PSYCHIATRY PROGRESS NOTE - [id number]_PSY_A_CORE
Chart reviewed, including vital signs, medication administration record, lab results, and nursing notes.   Case discussed with nursing, psychology, psych rehab, and social work in team meeting.     Patient is seen in the day room, in no acute distress, amenable to interview. She is happy to be leaving the hospital tomorrow. States that she will be starting a new program Monday through Thursday, lasting 2 1/2 hours a day for intensive DBT treatment. She acknowledges that if the treatment is not exactly what she expects, she will “give it more time“. Reports no medication side effects. Reports stable sleep and appetite. Denies SI/HI/AVH.
Chart reviewed, including vital signs, medication administration record, lab results, and nursing notes.   Patient met and talked about her feelings of hopelessness regarding her recurrent admissions to psychiatry.  She exhibits a depressed affect. States that her mother visited yesterday, and she is looking for residential programs for her to attend. She is tearful throughout the interview, and feels sad that she is repeatedly admitted to the hospital and is missing the birthdays of her friends and family.  She states that she “would rather die“ then have to stay in the hospital forever. Reports sleep was poor due to experiencing nightmares. Reports decreased appetite. Denies SI/HI/AVH. Denies any medication side effects.
Chart reviewed, including vital signs, medication administration record, lab results, and nursing notes.   Case discussed with nursing, psychology, psych rehab, and social work in team meeting.     Patient is seen in the day room, in no acute distress, amenable to interview. States that she does not feel Cymbalta is helping her, after having taken it for two weeks. She feels frustrated about being in the hospital, and states that “this is not helping me“. States that she had some discussions with her therapist today that made her “anxious“. Reports no medication side effects. Reports stable sleep and appetite. Denies SI/HI/AVH. We discussed the plan to administer a morning dose of prazosin to address anxiety.
Chart reviewed, including vital signs, medication administration record, lab results, and nursing notes.   Case discussed with nursing, psychology, psych rehab, and social work in team meeting.     Patient is seen in the day room, in no acute distress, amenable to interview. She is discharge focused today, and states that it is “not fair“ that she continues to require hospitalization. She states that “it feels different this time“ and she has committed to not seeing suicide as a solution. She states that “I have received all the exposure therapy that would benefit me in the hospital“. Reports no medication side effects. Reports stable sleep and appetite. Denies SI/HI/AVH.
Chart reviewed, including vital signs, medication administration record, lab results, and nursing notes. Pt says she needs to rest in bed today.   Patient talked about her feelings of hopelessness regarding her recurrent admissions to psychiatry.  She exhibits a depressed affect. States that her mother visited yesterday, and she is looking for residential programs for her to attend. She is tearful throughout the interview, and feels sad that she is repeatedly admitted to the hospital and is missing the birthdays of her friends and family.  She states that she “would rather die“ then have to stay in the hospital forever. Reports sleep was poor due to experiencing nightmares. Reports decreased appetite. Denies SI/HI/AVH. Denies any medication side effects.
Chart reviewed, including vital signs, medication administration record, lab results, and nursing notes.   Case discussed with nursing, psychology, psych rehab, and social work in team meeting.     Patient is seen in the day room, in no acute distress, amenable to interview. States that she is optimistic about leaving the hospital. States that she has plans to eat with her brother after. States that during this hospitalization, she feels that she is learning to “live with her anxiety“. She reports no medication side effects. Reports stable sleep and appetite. Denies SI/HI/AVH.
Chart reviewed, including vital signs, medication administration record, lab results, and nursing notes.   Case discussed with nursing, psychology, psych rehab, and social work in team meeting.     Patient is seen in the group room, in no acute distress, amenable to interview. States that she feels a little “on edge“ today. Reports that she did not sleep well last night due to experiencing nightmares. Reports stable appetite. Reports no medication side effects. Denies SI/HI/AVH. She declines increasing her dose of prazosin. We discuss potential referrals after she leaves the hospital.
Chart reviewed, including vital signs, medication administration record, lab results, and nursing notes.   Case discussed with nursing, psychology, psych rehab, and social work in team meeting.     Patient is seen in the group room, in no acute distress, amenable to interview. She exhibits a depressed affect. States that her mother visited yesterday, and she is looking for residential programs for her to attend. She is tearful throughout the interview, and feels sad that she is repeatedly admitted to the hospital. She states that she “would rather die“ then have to stay in the hospital forever. Reports sleep was poor due to experiencing nightmares. Reports decreased appetite. Denies SI/HI/AVH. Denies any medication side effects.
Chart reviewed, including vital signs, medication administration record, lab results, and nursing notes.   Case discussed with nursing, psychology, psych rehab, and social work in team meeting.     Patient seen in the day room, in no acute distress, amenable to interview. States that she has been reading and coloring over the weekend. States that “transitions are difficult“, but she feels more confident that she will be able to tolerate returning home. States that she is more willing to accept PRN medication, without asking permission from her parents. States that she has been working on exposure therapy with her therapist Elda. She denies medication side effects. Reports stable sleep and appetite. Denies SI/HI/AVH.
Chart reviewed, including vital signs, medication administration record, lab results, and nursing notes.   Case discussed with nursing, psychology, psych rehab, and social work in team meeting.     Patient is seen in the day room, in no acute distress, amenable to interview. She reports that she is doing better, and is recognizing that “suicide is not an option“ after working with her therapist. She also reports that she has been requesting Atarax to help with sleep. Reports stable sleep and appetite. Denies medication side effects. Denies SI/HI/AVH. Reports her mood is “OK“. We discussed the plans to continue searching for aftercare.

## 2022-06-07 NOTE — BH INPATIENT PSYCHIATRY PROGRESS NOTE - NSBHASSESSSUMMFT_PSY_ALL_CORE
Patient is a 21-year-old female with a history of major depressive disorder, FRANSISCO, and cluster B traits who initially presented for suicidal ideation, expressed to therapist while attending a partial hospitalization program. She was just discharged from this unit two days ago. The patient has been hospitalized thrice this year. The patient has had numerous failed medication trials. The normal course of her illness can be characterized by rapid decompensation, but also rapid improvement after hospitalization. The patient admits that hospitalization does not help her develop effective coping strategies outside the hospital, and she does not feel she needs continued hospitalization. However she meets criteria for hospitalization nonetheless given recent overdoses and failing partial hospitalization level of care.   PsyHx: Sees Dr. Morataya x 3 years. Therapist Aida Martines 1x hospitalization 1 month ago at [facility].  SH: Undergrad at [employer]. Lives with parents.    Week of 5/20: Differential diagnosis is broad and includes BPD (given chronic suicidality), MDD, and OCD (given obsessions that she might commit suicide).     1. Admission status  9.27 (Involuntary Admission)    2. Significant lab findings  None    3. Psychotropic medications  - Duloxetine 120 mg daily (depression)  	- Home medication  - Prazosin 1 mg daily, 4 mg QHS (nightmares)  	- Home medication, inc 5/24 for anxiety    4. Medical conditions, other medications, consults  A) Dyslipidemia  - Atorvastatin 10 mg QHS    5. Psychosocial interventions  - Family contacted: DIOGO  
Patient is a 21-year-old female with a history of major depressive disorder, FRANSISCO, and cluster B traits who initially presented for suicidal ideation, expressed to therapist while attending a partial hospitalization program. She was just discharged from this unit two days ago. The patient has been hospitalized thrice this year. The patient has had numerous failed medication trials. The normal course of her illness can be characterized by rapid decompensation, but also rapid improvement after hospitalization. The patient admits that hospitalization does not help her develop effective coping strategies outside the hospital, and she does not feel she needs continued hospitalization. However she meets criteria for hospitalization nonetheless given recent overdoses and failing partial hospitalization level of care.   PsyHx: Sees Dr. Morataya x 3 years. Therapist Aida Martines 1x hospitalization 1 month ago at [facility].  SH: Undergrad at [employer]. Lives with parents.    Week of 5/20: Differential diagnosis is broad and includes BPD (given chronic suicidality), MDD, and OCD (given obsessions that she might commit suicide).   Week of 5/31: Patient has demonstrated stability and has been able to safety plan effectively. She identifies having the initiative to ask for PRN medication as an effective way of moderating her anxiety.    1. Admission status  9.27 (Involuntary Admission)    2. Significant lab findings  None    3. Psychotropic medications  - Duloxetine 120 mg daily (depression)  	- Home medication  - Prazosin 1 mg daily, 4 mg QHS (nightmares)  	- Home medication, inc 5/24 for anxiety    4. Medical conditions, other medications, consults  A) Dyslipidemia  - Atorvastatin 10 mg QHS    5. Psychosocial interventions  - Family contacted: Contacted father 5/27  
Patient is a 21-year-old female with a history of major depressive disorder, FRANSISCO, and cluster B traits who initially presented for suicidal ideation, expressed to therapist while attending a partial hospitalization program. She was just discharged from this unit two days ago. The patient has been hospitalized thrice this year. The patient has had numerous failed medication trials. The normal course of her illness can be characterized by rapid decompensation, but also rapid improvement after hospitalization. The patient admits that hospitalization does not help her develop effective coping strategies outside the hospital, and she does not feel she needs continued hospitalization. However she meets criteria for hospitalization nonetheless given recent overdoses and failing partial hospitalization level of care.   PsyHx: Sees Dr. Morataya x 3 years. Therapist Aida Martnies 1x hospitalization 1 month ago at [facility].  SH: Undergrad at [employer]. Lives with parents.    Week of 5/20: Differential diagnosis is broad and includes BPD (given chronic suicidality), MDD, and OCD (given obsessions that she might commit suicide).    1. Admission status  9.27 (Involuntary Admission)    2. Significant lab findings  None    3. Psychotropic medications  - Duloxetine 120 mg daily (depression)  	- Home medication  - Prazosin 4 mg QHS (nightmares)  	- Home medication     4. Medical conditions, other medications, consults  A) Dyslipidemia  - Atorvastatin 10 mg QHS    5. Psychosocial interventions  - Family contacted: DIOGO  
Patient is a 21-year-old female with a history of major depressive disorder, FRANSISCO, and cluster B traits who initially presented for suicidal ideation, expressed to therapist while attending a partial hospitalization program. She was just discharged from this unit two days ago. The patient has been hospitalized thrice this year. The patient has had numerous failed medication trials. The normal course of her illness can be characterized by rapid decompensation, but also rapid improvement after hospitalization. The patient admits that hospitalization does not help her develop effective coping strategies outside the hospital, and she does not feel she needs continued hospitalization. However she meets criteria for hospitalization nonetheless given recent overdoses and failing partial hospitalization level of care.   PsyHx: Sees Dr. Morataya x 3 years. Therapist Aida Martines 1x hospitalization 1 month ago at [facility].  SH: Undergrad at [employer]. Lives with parents.    Week of 5/20: Differential diagnosis is broad and includes BPD (given chronic suicidality), MDD, and OCD (given obsessions that she might commit suicide).    1. Admission status  9.27 (Involuntary Admission)    2. Significant lab findings  None    3. Psychotropic medications  - Duloxetine 120 mg daily (depression)  	- Home medication  - Prazosin 4 mg QHS (nightmares)  	- Home medication     4. Medical conditions, other medications, consults  A) Dyslipidemia  - Atorvastatin 10 mg QHS    5. Psychosocial interventions  - Family contacted: DIOGO  
Patient is a 21-year-old female with a history of major depressive disorder, FRANSISCO, and cluster B traits who initially presented for suicidal ideation, expressed to therapist while attending a partial hospitalization program. She was just discharged from this unit two days ago. The patient has been hospitalized thrice this year. The patient has had numerous failed medication trials. The normal course of her illness can be characterized by rapid decompensation, but also rapid improvement after hospitalization. The patient admits that hospitalization does not help her develop effective coping strategies outside the hospital, and she does not feel she needs continued hospitalization. However she meets criteria for hospitalization nonetheless given recent overdoses and failing partial hospitalization level of care.   PsyHx: Sees Dr. Morataya x 3 years. Therapist Aida Martines 1x hospitalization 1 month ago at [facility].  SH: Undergrad at [employer]. Lives with parents.    Week of 5/20: Differential diagnosis is broad and includes BPD (given chronic suicidality), MDD, and OCD (given obsessions that she might commit suicide).   Week of 5/31: Patient has demonstrated stability and has been able to safety plan effectively. She identifies having the initiative to ask for PRN medication as an effective way of moderating her anxiety. Will continue searching for PHP.  Week of 6/6: Patient has demonstrated stability and consistently denies SI. Demonstrates better knowledge of coping skills and "not seeing suicide as an option". Identifies distress tolerance as a goal going forward. Will be participating in DBT at [facility] after discharge.     1. Admission status  9.27 (Involuntary Admission)    2. Significant lab findings  None    3. Psychotropic medications  - Duloxetine 120 mg daily (depression)  	- Home medication  - Prazosin 1 mg daily, 4 mg QHS (nightmares)  	- Home medication, inc 5/24 for anxiety    4. Medical conditions, other medications, consults  A) Dyslipidemia  - Atorvastatin 10 mg QHS    5. Psychosocial interventions  - Family contacted: Contacted father 5/27  
Patient is a 21-year-old female with a history of major depressive disorder, FRANSISCO, and cluster B traits who initially presented for suicidal ideation, expressed to therapist while attending a partial hospitalization program. She was just discharged from this unit two days ago. The patient has been hospitalized thrice this year. The patient has had numerous failed medication trials. The normal course of her illness can be characterized by rapid decompensation, but also rapid improvement after hospitalization. The patient admits that hospitalization does not help her develop effective coping strategies outside the hospital, and she does not feel she needs continued hospitalization. However she meets criteria for hospitalization nonetheless given recent overdoses and failing partial hospitalization level of care.   PsyHx: Sees Dr. Morataya x 3 years. Therapist Aida Martines 1x hospitalization 1 month ago at [facility].  SH: Undergrad at [employer]. Lives with parents.    Week of 5/20: Differential diagnosis is broad and includes BPD (given chronic suicidality), MDD, and OCD (given obsessions that she might commit suicide).   Week of 5/31: Patient has demonstrated stability and has been able to safety plan effectively. She identifies having the initiative to ask for PRN medication as an effective way of moderating her anxiety. Will continue searching for PHP.    1. Admission status  9.27 (Involuntary Admission)    2. Significant lab findings  None    3. Psychotropic medications  - Duloxetine 120 mg daily (depression)  	- Home medication  - Prazosin 1 mg daily, 4 mg QHS (nightmares)  	- Home medication, inc 5/24 for anxiety    4. Medical conditions, other medications, consults  A) Dyslipidemia  - Atorvastatin 10 mg QHS    5. Psychosocial interventions  - Family contacted: Contacted father 5/27  
Patient is a 21-year-old female with a history of major depressive disorder, FRANSISCO, and cluster B traits who initially presented for suicidal ideation, expressed to therapist while attending a partial hospitalization program. She was just discharged from this unit two days ago. The patient has been hospitalized thrice this year. The patient has had numerous failed medication trials. The normal course of her illness can be characterized by rapid decompensation, but also rapid improvement after hospitalization. The patient admits that hospitalization does not help her develop effective coping strategies outside the hospital, and she does not feel she needs continued hospitalization. However she meets criteria for hospitalization nonetheless given recent overdoses and failing partial hospitalization level of care.   PsyHx: Sees Dr. Morataya x 3 years. Therapist Aida Martines 1x hospitalization 1 month ago at [facility].  SH: Undergrad at [employer]. Lives with parents.    Week of 5/20: Differential diagnosis is broad and includes BPD (given chronic suicidality), MDD, and OCD (given obsessions that she might commit suicide).   Week of 5/31: Patient has demonstrated stability and has been able to safety plan effectively. She identifies having the initiative to ask for PRN medication as an effective way of moderating her anxiety. Will continue searching for PHP.  Week of 6/6: Patient has demonstrated stability and consistently denies SI. Demonstrates better knowledge of coping skills and "not seeing suicide as an option". Will be participating in DBT at [facility] after discharge.     1. Admission status  9.27 (Involuntary Admission)    2. Significant lab findings  None    3. Psychotropic medications  - Duloxetine 120 mg daily (depression)  	- Home medication  - Prazosin 1 mg daily, 4 mg QHS (nightmares)  	- Home medication, inc 5/24 for anxiety    4. Medical conditions, other medications, consults  A) Dyslipidemia  - Atorvastatin 10 mg QHS    5. Psychosocial interventions  - Family contacted: Contacted father 5/27  
Patient is a 21-year-old female with a history of major depressive disorder, FRANSISCO, and cluster B traits who initially presented for suicidal ideation, expressed to therapist while attending a partial hospitalization program. She was just discharged from this unit two days ago. The patient has been hospitalized thrice this year. The patient has had numerous failed medication trials. The normal course of her illness can be characterized by rapid decompensation, but also rapid improvement after hospitalization. The patient admits that hospitalization does not help her develop effective coping strategies outside the hospital, and she does not feel she needs continued hospitalization. However she meets criteria for hospitalization nonetheless given recent overdoses and failing partial hospitalization level of care.   PsyHx: Sees Dr. Morataya x 3 years. Therapist Aida Martines 1x hospitalization 1 month ago at [facility].  SH: Undergrad at [employer]. Lives with parents.    Week of 5/20: Differential diagnosis is broad and includes BPD (given chronic suicidality), MDD, and OCD (given obsessions that she might commit suicide).    1. Admission status  9.27 (Involuntary Admission)    2. Significant lab findings  None    3. Psychotropic medications  - Duloxetine 120 mg daily (depression)  	- Home medication  - Prazosin 4 mg QHS (nightmares)  	- Home medication     4. Medical conditions, other medications, consults  A) Dyslipidemia  - Atorvastatin 10 mg QHS    5. Psychosocial interventions  - Family contacted: DIOGO  
Patient is a 21-year-old female with a history of major depressive disorder, FRANSISCO, and cluster B traits who initially presented for suicidal ideation, expressed to therapist while attending a partial hospitalization program. She was just discharged from this unit two days ago. The patient has been hospitalized thrice this year. The patient has had numerous failed medication trials. The normal course of her illness can be characterized by rapid decompensation, but also rapid improvement after hospitalization. The patient admits that hospitalization does not help her develop effective coping strategies outside the hospital, and she does not feel she needs continued hospitalization. However she meets criteria for hospitalization nonetheless given recent overdoses and failing partial hospitalization level of care.   PsyHx: Sees Dr. Morataya x 3 years. Therapist Aida Martines 1x hospitalization 1 month ago at [facility].  SH: Undergrad at [employer]. Lives with parents.    Week of 5/20: Differential diagnosis is broad and includes BPD (given chronic suicidality), MDD, and OCD (given obsessions that she might commit suicide).   Week of 5/31: Patient has demonstrated stability and has been able to safety plan effectively. She identifies having the initiative to ask for PRN medication as an effective way of moderating her anxiety. Will continue searching for PHP.    1. Admission status  9.27 (Involuntary Admission)    2. Significant lab findings  None    3. Psychotropic medications  - Duloxetine 120 mg daily (depression)  	- Home medication  - Prazosin 1 mg daily, 4 mg QHS (nightmares)  	- Home medication, inc 5/24 for anxiety    4. Medical conditions, other medications, consults  A) Dyslipidemia  - Atorvastatin 10 mg QHS    5. Psychosocial interventions  - Family contacted: Contacted father 5/27  
Patient is a 21-year-old female with a history of major depressive disorder, FRANSISCO, and cluster B traits who initially presented for suicidal ideation, expressed to therapist while attending a partial hospitalization program. She was just discharged from this unit two days ago. The patient has been hospitalized thrice this year. The patient has had numerous failed medication trials. The normal course of her illness can be characterized by rapid decompensation, but also rapid improvement after hospitalization. The patient admits that hospitalization does not help her develop effective coping strategies outside the hospital, and she does not feel she needs continued hospitalization. However she meets criteria for hospitalization nonetheless given recent overdoses and failing partial hospitalization level of care.   PsyHx: Sees Dr. Morataya x 3 years. Therapist Aida Martines 1x hospitalization 1 month ago at [facility].  SH: Undergrad at [employer]. Lives with parents.    Week of 5/20: Differential diagnosis is broad and includes BPD (given chronic suicidality), MDD, and OCD (given obsessions that she might commit suicide).   Week of 5/31: Patient has demonstrated stability and has been able to safety plan effectively.     1. Admission status  9.27 (Involuntary Admission)    2. Significant lab findings  None    3. Psychotropic medications  - Duloxetine 120 mg daily (depression)  	- Home medication  - Prazosin 1 mg daily, 4 mg QHS (nightmares)  	- Home medication, inc 5/24 for anxiety    4. Medical conditions, other medications, consults  A) Dyslipidemia  - Atorvastatin 10 mg QHS    5. Psychosocial interventions  - Family contacted: Contacted father 5/27

## 2022-06-07 NOTE — BH PSYCHOLOGY - CLINICIAN PSYCHOTHERAPY NOTE - NSBHPSYCHOLPROBS_PSY_ALL_CORE
Anxiety/Depression/Suicidality

## 2022-06-07 NOTE — BH INPATIENT PSYCHIATRY PROGRESS NOTE - NSCGIIMPROVESX_PSY_ALL_CORE
4 = No change - symptoms remain essentially unchanged
3 = Minimally improved - slightly better with little or no clinically meaningful reduction of symptoms.  Represents very little change in basic clinical status, level of care, or functional capacity.
4 = No change - symptoms remain essentially unchanged
3 = Minimally improved - slightly better with little or no clinically meaningful reduction of symptoms.  Represents very little change in basic clinical status, level of care, or functional capacity.

## 2022-06-07 NOTE — BH PSYCHOLOGY - CLINICIAN PSYCHOTHERAPY NOTE - NSBHPTASSESSDT_PSY_A_CORE
31-May-2022 08:55
07-Jun-2022 08:15
27-May-2022 08:15
01-Jun-2022 08:30
25-May-2022 08:20
24-May-2022 08:25
06-Jun-2022 11:05
03-Jun-2022 08:35

## 2022-06-07 NOTE — BH INPATIENT PSYCHIATRY PROGRESS NOTE - NSTXSUICIDDATETRGT_PSY_ALL_CORE
26-May-2022
26-May-2022
09-Jun-2022
26-May-2022

## 2022-06-07 NOTE — BH INPATIENT PSYCHIATRY PROGRESS NOTE - NSTXDCOPLKDATEEST_PSY_ALL_CORE
27-May-2022
19-May-2022
19-May-2022
27-May-2022
19-May-2022
27-May-2022
19-May-2022
27-May-2022

## 2022-06-07 NOTE — BH INPATIENT PSYCHIATRY PROGRESS NOTE - NSICDXBHTERTIARYDX_PSY_ALL_CORE
R/O OCD (obsessive compulsive disorder)   F42.9  

## 2022-06-07 NOTE — BH INPATIENT PSYCHIATRY PROGRESS NOTE - CURRENT MEDICATION
MEDICATIONS  (STANDING):  atorvastatin 10 milliGRAM(s) Oral at bedtime  DULoxetine 120 milliGRAM(s) Oral daily  loratadine 10 milliGRAM(s) Oral daily  prazosin. 4 milliGRAM(s) Oral at bedtime  Vestura 3mG-0.02mG tabs 1 Tablet(s) 1 Tablet(s) Oral at bedtime    MEDICATIONS  (PRN):  hydrOXYzine hydrochloride 25 milliGRAM(s) Oral every 6 hours PRN anxiety  LORazepam     Tablet 1 milliGRAM(s) Oral every 6 hours PRN anxiety or agitation  LORazepam   Injectable 1 milliGRAM(s) IntraMuscular once PRN agitation  
MEDICATIONS  (STANDING):  atorvastatin 10 milliGRAM(s) Oral at bedtime  DULoxetine 120 milliGRAM(s) Oral daily  loratadine 10 milliGRAM(s) Oral daily  Pataday opthalmic solution 1 Drop(s) 1 Drop(s) Both EYES daily  prazosin. 1 milliGRAM(s) Oral <User Schedule>  prazosin. 4 milliGRAM(s) Oral at bedtime  Vestura 3mG-0.02mG tabs 1 Tablet(s) 1 Tablet(s) Oral at bedtime    MEDICATIONS  (PRN):  acetaminophen     Tablet .. 650 milliGRAM(s) Oral every 6 hours PRN Mild Pain (1 - 3), Moderate Pain (4 - 6), Severe Pain (7 - 10)  AQUAPHOR (petrolatum Ointment) 1 Application(s) Topical two times a day PRN anal fisures  calcium carbonate    500 mG (Tums) Chewable 1 Tablet(s) Chew two times a day PRN Dyspepsia  hydrOXYzine hydrochloride 25 milliGRAM(s) Oral every 6 hours PRN anxiety  LORazepam     Tablet 2 milliGRAM(s) Oral every 6 hours PRN Agitation  LORazepam   Injectable 2 milliGRAM(s) IntraMuscular once PRN Agitation  
MEDICATIONS  (STANDING):  atorvastatin 10 milliGRAM(s) Oral at bedtime  DULoxetine 120 milliGRAM(s) Oral daily  loratadine 10 milliGRAM(s) Oral daily  Pataday opthalmic solution 1 Drop(s) 1 Drop(s) Both EYES daily  prazosin. 4 milliGRAM(s) Oral at bedtime  Vestura 3mG-0.02mG tabs 1 Tablet(s) 1 Tablet(s) Oral at bedtime    MEDICATIONS  (PRN):  acetaminophen     Tablet .. 650 milliGRAM(s) Oral every 6 hours PRN Mild Pain (1 - 3), Moderate Pain (4 - 6), Severe Pain (7 - 10)  AQUAPHOR (petrolatum Ointment) 1 Application(s) Topical two times a day PRN anal fisures  calcium carbonate    500 mG (Tums) Chewable 1 Tablet(s) Chew two times a day PRN Dyspepsia  hydrOXYzine hydrochloride 25 milliGRAM(s) Oral every 6 hours PRN anxiety  LORazepam     Tablet 2 milliGRAM(s) Oral every 6 hours PRN Agitation  LORazepam   Injectable 2 milliGRAM(s) IntraMuscular once PRN Agitation  
MEDICATIONS  (STANDING):  atorvastatin 10 milliGRAM(s) Oral at bedtime  DULoxetine 120 milliGRAM(s) Oral daily  loratadine 10 milliGRAM(s) Oral daily  Pataday opthalmic solution 1 Drop(s) 1 Drop(s) Both EYES daily  prazosin. 4 milliGRAM(s) Oral at bedtime  prazosin. 1 milliGRAM(s) Oral daily  Vestura 3mG-0.02mG tabs 1 Tablet(s) 1 Tablet(s) Oral at bedtime    MEDICATIONS  (PRN):  acetaminophen     Tablet .. 650 milliGRAM(s) Oral every 6 hours PRN Mild Pain (1 - 3), Moderate Pain (4 - 6), Severe Pain (7 - 10)  AQUAPHOR (petrolatum Ointment) 1 Application(s) Topical two times a day PRN anal fisures  calcium carbonate    500 mG (Tums) Chewable 1 Tablet(s) Chew two times a day PRN Dyspepsia  hydrOXYzine hydrochloride 25 milliGRAM(s) Oral every 6 hours PRN anxiety  ibuprofen  Tablet. 400 milliGRAM(s) Oral two times a day PRN Mild Pain (1 - 3)  LORazepam     Tablet 2 milliGRAM(s) Oral every 6 hours PRN Agitation  LORazepam   Injectable 2 milliGRAM(s) IntraMuscular once PRN Agitation  
MEDICATIONS  (STANDING):  atorvastatin 10 milliGRAM(s) Oral at bedtime  DULoxetine 120 milliGRAM(s) Oral daily  loratadine 10 milliGRAM(s) Oral daily  prazosin. 4 milliGRAM(s) Oral at bedtime  Vestura 3mG-0.02mG tabs 1 Tablet(s) 1 Tablet(s) Oral at bedtime    MEDICATIONS  (PRN):  hydrOXYzine hydrochloride 25 milliGRAM(s) Oral every 6 hours PRN anxiety  LORazepam     Tablet 1 milliGRAM(s) Oral every 6 hours PRN anxiety or agitation  LORazepam   Injectable 1 milliGRAM(s) IntraMuscular once PRN agitation  
MEDICATIONS  (STANDING):  atorvastatin 10 milliGRAM(s) Oral at bedtime  DULoxetine 120 milliGRAM(s) Oral daily  loratadine 10 milliGRAM(s) Oral daily  prazosin. 4 milliGRAM(s) Oral at bedtime  Vestura 3mG-0.02mG tabs 1 Tablet(s) 1 Tablet(s) Oral at bedtime    MEDICATIONS  (PRN):  hydrOXYzine hydrochloride 25 milliGRAM(s) Oral every 6 hours PRN anxiety  LORazepam     Tablet 1 milliGRAM(s) Oral every 6 hours PRN anxiety or agitation  LORazepam   Injectable 1 milliGRAM(s) IntraMuscular once PRN agitation  
MEDICATIONS  (STANDING):  atorvastatin 10 milliGRAM(s) Oral at bedtime  DULoxetine 120 milliGRAM(s) Oral daily  loratadine 10 milliGRAM(s) Oral daily  prazosin. 4 milliGRAM(s) Oral at bedtime  prazosin. 1 milliGRAM(s) Oral daily  Vestura 3mG-0.02mG tabs 1 Tablet(s) 1 Tablet(s) Oral at bedtime    MEDICATIONS  (PRN):  acetaminophen     Tablet .. 650 milliGRAM(s) Oral every 6 hours PRN Mild Pain (1 - 3), Moderate Pain (4 - 6), Severe Pain (7 - 10)  hydrOXYzine hydrochloride 25 milliGRAM(s) Oral every 6 hours PRN anxiety  LORazepam     Tablet 1 milliGRAM(s) Oral every 6 hours PRN anxiety or agitation  LORazepam   Injectable 1 milliGRAM(s) IntraMuscular once PRN agitation  
MEDICATIONS  (STANDING):  atorvastatin 10 milliGRAM(s) Oral at bedtime  DULoxetine 120 milliGRAM(s) Oral daily  loratadine 10 milliGRAM(s) Oral daily  Pataday opthalmic solution 1 Drop(s) 1 Drop(s) Both EYES daily  prazosin. 4 milliGRAM(s) Oral at bedtime  prazosin. 1 milliGRAM(s) Oral daily  Vestura 3mG-0.02mG tabs 1 Tablet(s) 1 Tablet(s) Oral at bedtime    MEDICATIONS  (PRN):  acetaminophen     Tablet .. 650 milliGRAM(s) Oral every 6 hours PRN Mild Pain (1 - 3), Moderate Pain (4 - 6), Severe Pain (7 - 10)  AQUAPHOR (petrolatum Ointment) 1 Application(s) Topical two times a day PRN anal fisures  calcium carbonate    500 mG (Tums) Chewable 1 Tablet(s) Chew two times a day PRN Dyspepsia  hydrOXYzine hydrochloride 25 milliGRAM(s) Oral every 6 hours PRN anxiety  LORazepam     Tablet 2 milliGRAM(s) Oral every 6 hours PRN Agitation  LORazepam   Injectable 2 milliGRAM(s) IntraMuscular once PRN Agitation  
MEDICATIONS  (STANDING):  atorvastatin 10 milliGRAM(s) Oral at bedtime  DULoxetine 120 milliGRAM(s) Oral daily  loratadine 10 milliGRAM(s) Oral daily  Pataday opthalmic solution 1 Drop(s) 1 Drop(s) Both EYES daily  prazosin. 4 milliGRAM(s) Oral at bedtime  prazosin. 1 milliGRAM(s) Oral <User Schedule>  Vestura 3mG-0.02mG tabs 1 Tablet(s) 1 Tablet(s) Oral at bedtime    MEDICATIONS  (PRN):  acetaminophen     Tablet .. 650 milliGRAM(s) Oral every 6 hours PRN Mild Pain (1 - 3), Moderate Pain (4 - 6), Severe Pain (7 - 10)  AQUAPHOR (petrolatum Ointment) 1 Application(s) Topical two times a day PRN anal fisures  calcium carbonate    500 mG (Tums) Chewable 1 Tablet(s) Chew two times a day PRN Dyspepsia  hydrOXYzine hydrochloride 25 milliGRAM(s) Oral every 6 hours PRN anxiety  LORazepam     Tablet 2 milliGRAM(s) Oral every 6 hours PRN Agitation  LORazepam   Injectable 2 milliGRAM(s) IntraMuscular once PRN Agitation  
MEDICATIONS  (STANDING):  atorvastatin 10 milliGRAM(s) Oral at bedtime  DULoxetine 120 milliGRAM(s) Oral daily  loratadine 10 milliGRAM(s) Oral daily  Pataday opthalmic solution 1 Drop(s) 1 Drop(s) Both EYES daily  prazosin. 1 milliGRAM(s) Oral <User Schedule>  prazosin. 4 milliGRAM(s) Oral at bedtime  Vestura 3mG-0.02mG tabs 1 Tablet(s) 1 Tablet(s) Oral at bedtime    MEDICATIONS  (PRN):  acetaminophen     Tablet .. 650 milliGRAM(s) Oral every 6 hours PRN Mild Pain (1 - 3), Moderate Pain (4 - 6), Severe Pain (7 - 10)  AQUAPHOR (petrolatum Ointment) 1 Application(s) Topical two times a day PRN anal fisures  calcium carbonate    500 mG (Tums) Chewable 1 Tablet(s) Chew two times a day PRN Dyspepsia  hydrOXYzine hydrochloride 25 milliGRAM(s) Oral every 6 hours PRN anxiety  LORazepam     Tablet 2 milliGRAM(s) Oral every 6 hours PRN Agitation  LORazepam   Injectable 2 milliGRAM(s) IntraMuscular once PRN Agitation

## 2022-06-07 NOTE — BH INPATIENT PSYCHIATRY PROGRESS NOTE - NSTXDEPRESGOAL_PSY_ALL_CORE
Report journaling 5 counter-statements to negative predictions/self-image daily
Will identify 2 coping skills that assist in improving mood
Report journaling 5 counter-statements to negative predictions/self-image daily

## 2022-06-07 NOTE — BH PSYCHOLOGY - CLINICIAN PSYCHOTHERAPY NOTE - NSBHPSYCHOLNARRATIVE_PSY_A_CORE FT
Patient was alert, cooperative, and in control. Oriented x4. Casually dressed, fairly groomed. Maintained good eye contact. Speech normal in production, rate, volume, and tone. No abnormal psychomotor behavior. Mood "good" with euthymic affect. Thought process logical, goal-directed. Thought content relevant to discussion. Denied auditory/visual hallucinations and suicidal/homicidal ideation, intent, plan, and urges to self-harm. Impulse control intact. Insight and judgment fair.    Session focused on discharge planning. Patient reported feeling "excited" to leave the hospital and return home. Writer reviewed with patient cope ahead skill that patient had practiced for homework. Patient also reported creating schedule for herself for the next week. Praise was utilized to reinforce patient's homework compliance and skills use. Writer reviewed with patient skills that have been helpful during the patient's hospital stay. Patient agreed to continue practicing interoceptive exposure and approach stimuli she typically avoids to build her tolerance to distress. 
Patient was alert, cooperative, and in control. Oriented x4. Casually dressed, fairly groomed. Maintained good eye contact. Speech normal in production, rate, volume, and tone. No abnormal psychomotor behavior. Mood "okay" with congruent affect. Thought process logical, goal-directed. Thought content relevant to discussion. Denied auditory/visual hallucinations and current suicidal/homicidal ideation, intent, plan, and urges to self-harm. Impulse control intact. Insight and judgment fair. Patient committed to remaining safe on the unit and agreed to inform staff if urges to self-harm or suicidal ideation became active.    Session focused on building rapport and identifying factors that contributed to hospitalization. Behavior chain analysis was utilized and patient reported having suicidal thoughts on Wednesday, characterized by images of her taking medication to overdose or jumping in front of a car and urges to look up medications she could take that would help her successful kill herself. Patient reported that she initially had ambiguous intent to act on these thoughts with the desire to die, but then began to worry that she would not be successful in her attempt, which she reported scared her. Consequently, patient reported that she disclosed these thoughts to her therapist because she was already at her partial hospitalization program, and the therapist referred her to the hospital. Validation and reflective listening were utilized. Inference chaining was utilized to identify patient's fears that keep her from acting on thoughts and also keep her stuck in loop of anxiety and hospitalization. Patient identified two prominent fears: 1) living with the thoughts of suicide and anxiety, and 2) attempting suicide and not succeeding. Patient noted that, for both fears, the worst case scenario is that she would live her life on a couch, crying and suffering, being dependent on others. Cognitive disputation and motivational interviewing were utilized to help patient identify the role suicidal thoughts play in her suffering and help her generate adaptive thoughts that she could use in place of suicidal thoughts when she is feeling anxious or experiencing distress. Psychoeducation was provided about the role suicidal thoughts play in temporarily decreasing the anxiety that comes with thinking about not getting better, but that ultimately lead her to suffer more in the long term. Patient agreed to write down thought she could use to combat contemplations of suicide when they emerge or when she has urges to escape her distress.
Patient was alert, cooperative, and in control. Oriented x4. Casually dressed, fairly groomed. Maintained good eye contact. Speech normal in production, rate, volume, and tone. No abnormal psychomotor behavior. Mood "anxious" with congruent affect. Thought process logical, goal-directed. Thought content relevant to discussion. Denied auditory/visual hallucinations and current suicidal/homicidal ideation, intent, plan, and urges to self-harm. Impulse control intact. Insight and judgment fair. Patient committed to remaining safe on the unit and agreed to inform staff if urges to self-harm or suicidal ideation became active.    Session focused on exposure to anxiety-provoking thoughts. Patient reported that she had generated an alternative thought she could believe about her commitment to living and which she committed to using in lieu of suicidal thoughts. Patient reported that she does not want to kill herself, but is afraid of living with anxiety forever. Validation and reflective listening were utilized. Prolonged exposure techniques allowing patient to repeatedly accept that she might live with anxiety forever were utilized to help patient habituate to the fear of living with anxiety. Patient agreed to practice this exposure to the concept of living with anxiety and repeating the phrase, "I would not like living with anxiety and I could live with it and I could tolerate it," for 15 minutes twice per day before her next session on Friday. 
Patient was alert, cooperative, and in control. Oriented x4. Casually dressed, fairly groomed. Maintained good eye contact. Speech normal in production, rate, volume, and tone. No abnormal psychomotor behavior. Mood "good... hopeful" with congruent affect. Thought process logical, goal-directed. Thought content relevant to discussion. Denied auditory/visual hallucinations and suicidal/homicidal ideation, intent, plan, and urges to self-harm. Impulse control intact. Insight and judgment fair.    Session focused on discharge planning. Patient reported feeling "hopeful" about discharge from hospitalization and "ready" to return home to family and cope with stressors outside of hospital. Patient reported no concerns about discharge. Writer led patient in review of skills she has acquired during her hospitalization. Patient noted feeling more prepared to take PRNs as needed to manage anxiety, as well as feeling more competent managing symptoms of panic and anxiety due to practice with exposure therapy and because she has eliminated suicide as an option for escaping distress. Praise was utilized to reinforce patient's skills use. Patient reported still practicing interoceptive exposure exercises, which have helped her feel more comfortable tolerating physiological symptoms of anxiety. Patient agreed to continue practicing interoceptive exposures. Patient also noted unplanned social exposures that have helped her push through anxiety-provoking experiences and learn that she can tolerate distress. Writer led patient in reflection of how she can use what she has learned in this hospital to help her maintain her progress on an outpatient basis.
Patient was alert, cooperative, and in control. Oriented x4. Casually dressed, fairly groomed. Maintained good eye contact. Speech normal in production, rate, volume, and tone. No abnormal psychomotor behavior. Mood "okay, not terribly anxious" with congruent affect. Thought process logical, goal-directed. Thought content relevant to discussion. Denied auditory/visual hallucinations and current suicidal/homicidal ideation, intent, plan, and urges to self-harm. Impulse control intact. Insight and judgment fair. Patient continued to commit to remaining safe on the unit.    Session focused on exposure to anxiety-provoking stimuli and psychoeducation. Patient reported practicing imaginal exposure to idea that she could live with anxiety for the rest of her life. Patient noted that this exercise was initially very anxiety-provoking, but that after engaging in the exposure twice, she found it less anxiety-provoking on the third time. Praise was utilized to reinforce patient's homework compliance and engagement in exposure activities. Review of the expectations the patient had entering the activity and the thoughts she had about it during and after the activity were used to activate inhibitory learning. Patient was also provided psychoeducation about anxiety sensitivity and the role awareness of somatic symptoms plays in reinforcing anxiety and panic symptoms. Patient reported belief that her experience of anxiety is consistent with this theory. Psychoeducation about interoceptive exposure was provided and patient reported desire to begin this intervention during her next session. Patient agreed to continue practicing imaginal exposure between now and her next session, as well as noticing when she has physiological symptoms of anxiety and using cognitive restructuring to remind herself that there may not be an emotional reason for her anxiety, but instead that this is her body's signals that are sensitive to internal and external changes (e.g., changes in sleep, caffeine intake, etc.) and that they will pass. 
Patient was alert, cooperative, and in control. Oriented x4. Casually dressed, fairly groomed. Maintained good eye contact. Speech normal in production, rate, volume, and tone. No abnormal psychomotor behavior. Mood "anxious" with neutral affect. Thought process logical, goal-directed. Thought content relevant to discussion. Denied auditory/visual hallucinations and suicidal/homicidal ideation, intent, plan, and urges to self-harm. Impulse control intact. Insight and judgment fair.    Session focused on interoceptive exposure and safety planning. Patient was able to identify appropriate items for each section. Please see Patient Safety Plan for further details. Patient reported continuing to practice cognitive restructuring and interoceptive exposure. Patient also noted that incidents with other patients on the unit have led her to have increased physiological symptoms of anxiety. Writer led patient in identifying ways she can use these exposures to anxiety-provoking stimuli as evidence to help her combat future anxiety. Patient reported acknowledging that she can survive anxiety inside and outside of the hospital and that, even though the physiological symptoms of anxiety are uncomfortable, she can tolerate them and they do pass. Praise was utilized to reinforce patient's cognitive restructuring. Patient then engaged in interoceptive exposure activity of doing 20 jumping jacks with writer to expose her to sensation of increased heart rate. Patient again was led in reflection and observation of expectations and experiences before, during, and after exposure activity to aid in inhibitory learning. Patient agreed to practice jumping jacks and holding breath exposure activities today and tomorrow.
Patient was alert, cooperative, and in control. Oriented x4. Casually dressed, fairly groomed. Maintained good eye contact. Speech normal in production, rate, volume, and tone. No abnormal psychomotor behavior. Mood "good" with congruent affect. Thought process logical, goal-directed. Thought content relevant to discussion. Denied auditory/visual hallucinations and suicidal/homicidal ideation, intent, plan, and urges to self-harm. Impulse control intact. Insight and judgment fair.    Session focused on interoceptive exposure. Patient reported continuing to practice thought that she can tolerate living with anxiety and noted feeling less anxious over time and with practice when using that thought. Patient also reported continuing to remind herself that suicide is not an option for her when she feels anxious. Praise was utilized to reinforce patient's homework compliance. Writer then reviewed with patient interoceptive exposure exercise of breathing through a straw to induce feelings of shortness of breath that often accompany panic symptoms. Writer reviewed print-out from [facility] ([email or web address] with patient and patient reported interest and willingness to engage in activity. Writer led patient in interoceptive exposure activity. After exposure activity, writer aided patient in observing and reflecting on differences and similarities between anticipated and lived experience before and during exercise, respectively. Patient noted that she was anxious about the exercise beforehand, but felt she did not experience symptoms of panic during it. Writer then discussed with patient alternative interoceptive exposure exercise (holding breath for as long as she can) to induce symptoms. Patient agreed to try this activity and again writer led patient in exercise of holding breath, as well as identifying observations and reflections of activity afterwards. Patient reported noticing thoughts of panic during activity and acting opposite the urge to engage in safety behaviors (e.g., taking a deep breath) while using cognitive restructuring to endure discomfort. Praise was utilized to reinforce patient's willingness. Patient agreed to practice this exercise twice more today and once more tomorrow before her next session.
Patient was alert, cooperative, and in control. Oriented x4. Casually dressed, fairly groomed. Maintained good eye contact. Speech normal in production, rate, volume, and tone. No abnormal psychomotor behavior. Mood "good" with congruent affect. Thought process logical, goal-directed. Thought content relevant to discussion. Denied auditory/visual hallucinations and suicidal/homicidal ideation, intent, plan, and urges to self-harm. Impulse control intact. Insight and judgment fair.    Session focused on discharge planning. Patient reported being able to cope with disappointment when she was unable to be discharged last week. Patient reported continuing to feel "hopeful" about discharge and prepared to cope with stressors. Patient also acknowledged benefits of staying in hospital over the weekend until her outpatient treatment was secured to maximize successful transition. Validation and reflective listening were utilized. Writer also reviewed cope ahead skill with patient and led patient in identifying barriers to effective coping. Patient agreed to practice this skill before her next session and to create loose schedule to follow at home to enhance structure after initial discharge from hospital and before she begins her outpatient program.

## 2022-06-07 NOTE — BH INPATIENT PSYCHIATRY PROGRESS NOTE - NSTXDEPRESDATEEST_PSY_ALL_CORE
19-May-2022

## 2022-06-07 NOTE — BH INPATIENT PSYCHIATRY PROGRESS NOTE - NSTXANXDATETRGT_PSY_ALL_CORE
19-May-2022
26-May-2022
02-Jun-2022
26-May-2022
19-May-2022
02-Jun-2022
19-May-2022
19-May-2022

## 2022-06-07 NOTE — BH INPATIENT PSYCHIATRY PROGRESS NOTE - NSBHFUPINTERVALCCFT_PSY_A_CORE
Suicidal ideation

## 2022-06-07 NOTE — BH INPATIENT PSYCHIATRY PROGRESS NOTE - NSBHMETABOLIC_PSY_ALL_CORE_FT
BMI: BMI (kg/m2): 27.3 (05-18-22 @ 23:30)  HbA1c: A1C with Estimated Average Glucose Result: 4.6 % (04-19-22 @ 11:13)    Glucose: POCT Blood Glucose.: 122 mg/dL (04-17-22 @ 16:05)    BP: 113/81 (05-31-22 @ 08:21) (103/62 - 115/72)  Lipid Panel: Date/Time: 04-23-22 @ 07:02  Cholesterol, Serum: 140  Direct LDL: --  HDL Cholesterol, Serum: 63  Total Cholesterol/HDL Ration Measurement: --  Triglycerides, Serum: 147  
BMI: BMI (kg/m2): 27.3 (05-18-22 @ 23:30)  HbA1c: A1C with Estimated Average Glucose Result: 4.6 % (04-19-22 @ 11:13)    Glucose: POCT Blood Glucose.: 122 mg/dL (04-17-22 @ 16:05)    BP: 108/66 (06-01-22 @ 07:39) (107/69 - 115/72)  Lipid Panel: Date/Time: 04-23-22 @ 07:02  Cholesterol, Serum: 140  Direct LDL: --  HDL Cholesterol, Serum: 63  Total Cholesterol/HDL Ration Measurement: --  Triglycerides, Serum: 147  
BMI: BMI (kg/m2): 27.3 (05-18-22 @ 23:30)  HbA1c: A1C with Estimated Average Glucose Result: 4.6 % (04-19-22 @ 11:13)    Glucose: POCT Blood Glucose.: 122 mg/dL (04-17-22 @ 16:05)    BP: 113/65 (05-21-22 @ 07:55) (113/65 - 117/66)  Lipid Panel: Date/Time: 04-23-22 @ 07:02  Cholesterol, Serum: 140  Direct LDL: --  HDL Cholesterol, Serum: 63  Total Cholesterol/HDL Ration Measurement: --  Triglycerides, Serum: 147  
BMI: BMI (kg/m2): 27.3 (05-18-22 @ 23:30)  HbA1c: A1C with Estimated Average Glucose Result: 4.6 % (04-19-22 @ 11:13)    Glucose: POCT Blood Glucose.: 122 mg/dL (04-17-22 @ 16:05)    BP: 122/80 (05-24-22 @ 06:25) (105/75 - 130/75)  Lipid Panel: Date/Time: 04-23-22 @ 07:02  Cholesterol, Serum: 140  Direct LDL: --  HDL Cholesterol, Serum: 63  Total Cholesterol/HDL Ration Measurement: --  Triglycerides, Serum: 147  
BMI: BMI (kg/m2): 27.3 (05-18-22 @ 23:30)  HbA1c: A1C with Estimated Average Glucose Result: 4.6 % (04-19-22 @ 11:13)    Glucose: POCT Blood Glucose.: 122 mg/dL (04-17-22 @ 16:05)    BP: 105/75 (05-22-22 @ 06:14) (105/75 - 113/79)  Lipid Panel: Date/Time: 04-23-22 @ 07:02  Cholesterol, Serum: 140  Direct LDL: --  HDL Cholesterol, Serum: 63  Total Cholesterol/HDL Ration Measurement: --  Triglycerides, Serum: 147  
BMI: BMI (kg/m2): 27.3 (05-18-22 @ 23:30)  HbA1c: A1C with Estimated Average Glucose Result: 4.6 % (04-19-22 @ 11:13)    Glucose: POCT Blood Glucose.: 122 mg/dL (04-17-22 @ 16:05)    BP: 113/79 (05-20-22 @ 06:43) (113/79 - 117/66)  Lipid Panel: Date/Time: 04-23-22 @ 07:02  Cholesterol, Serum: 140  Direct LDL: --  HDL Cholesterol, Serum: 63  Total Cholesterol/HDL Ration Measurement: --  Triglycerides, Serum: 147  
BMI: BMI (kg/m2): 27.3 (05-18-22 @ 23:30)  HbA1c: A1C with Estimated Average Glucose Result: 4.6 % (04-19-22 @ 11:13)    Glucose: POCT Blood Glucose.: 122 mg/dL (04-17-22 @ 16:05)    BP: 110/71 (06-06-22 @ 08:24) (109/74 - 116/70)  Lipid Panel: Date/Time: 04-23-22 @ 07:02  Cholesterol, Serum: 140  Direct LDL: --  HDL Cholesterol, Serum: 63  Total Cholesterol/HDL Ration Measurement: --  Triglycerides, Serum: 147  
BMI: BMI (kg/m2): 27.3 (05-18-22 @ 23:30)  HbA1c: A1C with Estimated Average Glucose Result: 4.6 % (04-19-22 @ 11:13)    Glucose: POCT Blood Glucose.: 122 mg/dL (04-17-22 @ 16:05)    BP: 103/71 (06-03-22 @ 08:01) (103/71 - 112/71)  Lipid Panel: Date/Time: 04-23-22 @ 07:02  Cholesterol, Serum: 140  Direct LDL: --  HDL Cholesterol, Serum: 63  Total Cholesterol/HDL Ration Measurement: --  Triglycerides, Serum: 147  
BMI: BMI (kg/m2): 27.3 (05-18-22 @ 23:30)  HbA1c: A1C with Estimated Average Glucose Result: 4.6 % (04-19-22 @ 11:13)    Glucose: POCT Blood Glucose.: 122 mg/dL (04-17-22 @ 16:05)    BP: 111/69 (06-07-22 @ 06:23) (109/74 - 132/89)  Lipid Panel: Date/Time: 04-23-22 @ 07:02  Cholesterol, Serum: 140  Direct LDL: --  HDL Cholesterol, Serum: 63  Total Cholesterol/HDL Ration Measurement: --  Triglycerides, Serum: 147  
BMI: BMI (kg/m2): 27.3 (05-18-22 @ 23:30)  HbA1c: A1C with Estimated Average Glucose Result: 4.6 % (04-19-22 @ 11:13)    Glucose: POCT Blood Glucose.: 122 mg/dL (04-17-22 @ 16:05)    BP: 112/71 (06-02-22 @ 07:53) (108/66 - 113/81)  Lipid Panel: Date/Time: 04-23-22 @ 07:02  Cholesterol, Serum: 140  Direct LDL: --  HDL Cholesterol, Serum: 63  Total Cholesterol/HDL Ration Measurement: --  Triglycerides, Serum: 147

## 2022-06-07 NOTE — BH INPATIENT PSYCHIATRY PROGRESS NOTE - NSICDXBHPRIMARYDX_PSY_ALL_CORE
MDD (major depressive disorder)   F32.9  

## 2022-06-07 NOTE — BH INPATIENT PSYCHIATRY PROGRESS NOTE - NSTXDEPRESDATETRGT_PSY_ALL_CORE
09-Jun-2022
09-Jun-2022
26-May-2022
09-Jun-2022
09-Jun-2022
26-May-2022
26-May-2022

## 2022-06-07 NOTE — BH INPATIENT PSYCHIATRY PROGRESS NOTE - PRN MEDS
MEDICATIONS  (PRN):  acetaminophen     Tablet .. 650 milliGRAM(s) Oral every 6 hours PRN Mild Pain (1 - 3), Moderate Pain (4 - 6), Severe Pain (7 - 10)  AQUAPHOR (petrolatum Ointment) 1 Application(s) Topical two times a day PRN anal fisures  calcium carbonate    500 mG (Tums) Chewable 1 Tablet(s) Chew two times a day PRN Dyspepsia  hydrOXYzine hydrochloride 25 milliGRAM(s) Oral every 6 hours PRN anxiety  LORazepam     Tablet 2 milliGRAM(s) Oral every 6 hours PRN Agitation  LORazepam   Injectable 2 milliGRAM(s) IntraMuscular once PRN Agitation  
MEDICATIONS  (PRN):  acetaminophen     Tablet .. 650 milliGRAM(s) Oral every 6 hours PRN Mild Pain (1 - 3), Moderate Pain (4 - 6), Severe Pain (7 - 10)  AQUAPHOR (petrolatum Ointment) 1 Application(s) Topical two times a day PRN anal fisures  calcium carbonate    500 mG (Tums) Chewable 1 Tablet(s) Chew two times a day PRN Dyspepsia  hydrOXYzine hydrochloride 25 milliGRAM(s) Oral every 6 hours PRN anxiety  LORazepam     Tablet 2 milliGRAM(s) Oral every 6 hours PRN Agitation  LORazepam   Injectable 2 milliGRAM(s) IntraMuscular once PRN Agitation  
MEDICATIONS  (PRN):  hydrOXYzine hydrochloride 25 milliGRAM(s) Oral every 6 hours PRN anxiety  LORazepam     Tablet 1 milliGRAM(s) Oral every 6 hours PRN anxiety or agitation  LORazepam   Injectable 1 milliGRAM(s) IntraMuscular once PRN agitation  
MEDICATIONS  (PRN):  hydrOXYzine hydrochloride 25 milliGRAM(s) Oral every 6 hours PRN anxiety  LORazepam     Tablet 1 milliGRAM(s) Oral every 6 hours PRN anxiety or agitation  LORazepam   Injectable 1 milliGRAM(s) IntraMuscular once PRN agitation  
MEDICATIONS  (PRN):  acetaminophen     Tablet .. 650 milliGRAM(s) Oral every 6 hours PRN Mild Pain (1 - 3), Moderate Pain (4 - 6), Severe Pain (7 - 10)  AQUAPHOR (petrolatum Ointment) 1 Application(s) Topical two times a day PRN anal fisures  calcium carbonate    500 mG (Tums) Chewable 1 Tablet(s) Chew two times a day PRN Dyspepsia  hydrOXYzine hydrochloride 25 milliGRAM(s) Oral every 6 hours PRN anxiety  LORazepam     Tablet 2 milliGRAM(s) Oral every 6 hours PRN Agitation  LORazepam   Injectable 2 milliGRAM(s) IntraMuscular once PRN Agitation  
MEDICATIONS  (PRN):  acetaminophen     Tablet .. 650 milliGRAM(s) Oral every 6 hours PRN Mild Pain (1 - 3), Moderate Pain (4 - 6), Severe Pain (7 - 10)  AQUAPHOR (petrolatum Ointment) 1 Application(s) Topical two times a day PRN anal fisures  calcium carbonate    500 mG (Tums) Chewable 1 Tablet(s) Chew two times a day PRN Dyspepsia  hydrOXYzine hydrochloride 25 milliGRAM(s) Oral every 6 hours PRN anxiety  LORazepam     Tablet 2 milliGRAM(s) Oral every 6 hours PRN Agitation  LORazepam   Injectable 2 milliGRAM(s) IntraMuscular once PRN Agitation  
MEDICATIONS  (PRN):  hydrOXYzine hydrochloride 25 milliGRAM(s) Oral every 6 hours PRN anxiety  LORazepam     Tablet 1 milliGRAM(s) Oral every 6 hours PRN anxiety or agitation  LORazepam   Injectable 1 milliGRAM(s) IntraMuscular once PRN agitation  
MEDICATIONS  (PRN):  acetaminophen     Tablet .. 650 milliGRAM(s) Oral every 6 hours PRN Mild Pain (1 - 3), Moderate Pain (4 - 6), Severe Pain (7 - 10)  AQUAPHOR (petrolatum Ointment) 1 Application(s) Topical two times a day PRN anal fisures  calcium carbonate    500 mG (Tums) Chewable 1 Tablet(s) Chew two times a day PRN Dyspepsia  hydrOXYzine hydrochloride 25 milliGRAM(s) Oral every 6 hours PRN anxiety  LORazepam     Tablet 2 milliGRAM(s) Oral every 6 hours PRN Agitation  LORazepam   Injectable 2 milliGRAM(s) IntraMuscular once PRN Agitation  
MEDICATIONS  (PRN):  acetaminophen     Tablet .. 650 milliGRAM(s) Oral every 6 hours PRN Mild Pain (1 - 3), Moderate Pain (4 - 6), Severe Pain (7 - 10)  hydrOXYzine hydrochloride 25 milliGRAM(s) Oral every 6 hours PRN anxiety  LORazepam     Tablet 1 milliGRAM(s) Oral every 6 hours PRN anxiety or agitation  LORazepam   Injectable 1 milliGRAM(s) IntraMuscular once PRN agitation  
MEDICATIONS  (PRN):  acetaminophen     Tablet .. 650 milliGRAM(s) Oral every 6 hours PRN Mild Pain (1 - 3), Moderate Pain (4 - 6), Severe Pain (7 - 10)  AQUAPHOR (petrolatum Ointment) 1 Application(s) Topical two times a day PRN anal fisures  calcium carbonate    500 mG (Tums) Chewable 1 Tablet(s) Chew two times a day PRN Dyspepsia  hydrOXYzine hydrochloride 25 milliGRAM(s) Oral every 6 hours PRN anxiety  ibuprofen  Tablet. 400 milliGRAM(s) Oral two times a day PRN Mild Pain (1 - 3)  LORazepam     Tablet 2 milliGRAM(s) Oral every 6 hours PRN Agitation  LORazepam   Injectable 2 milliGRAM(s) IntraMuscular once PRN Agitation

## 2022-06-07 NOTE — BH INPATIENT PSYCHIATRY PROGRESS NOTE - NSTXSUICIDINTERMD_PSY_ALL_CORE
Psychopharmacology  DBT Skills  Psychoeducation

## 2022-06-07 NOTE — BH INPATIENT PSYCHIATRY PROGRESS NOTE - NSCGISEVERILLNESS_PSY_ALL_CORE
4 = Moderately ill – overt symptoms causing noticeable, but modest, functional impairment or distress; symptom level may warrant medication
4 = Moderately ill – overt symptoms causing noticeable, but modest, functional impairment or distress; symptom level may warrant medication
3 = Mildly ill – clearly established symptoms with minimal, if any, distress or difficulty in social and occupational function
4 = Moderately ill – overt symptoms causing noticeable, but modest, functional impairment or distress; symptom level may warrant medication
3 = Mildly ill – clearly established symptoms with minimal, if any, distress or difficulty in social and occupational function
4 = Moderately ill – overt symptoms causing noticeable, but modest, functional impairment or distress; symptom level may warrant medication

## 2022-06-30 NOTE — BH INPATIENT PSYCHIATRY ASSESSMENT NOTE - NSBHATTENDATTEST_PSY_ALL_CORE
Private car I have personally seen, examined and participated in the care of this patient. I have reviewed all pertinent clinical information, including history, physical exam, plan and the Medical/PA/NP Student’s note and agree except as noted.

## 2022-07-28 NOTE — BH CONSULTATION LIAISON ASSESSMENT NOTE - RISK ASSESSMENT
Office phone number: [phone].  Call us with questions.     Chronic: prior psych hospitalization, hx of SI, depression/anxiety  Acute: current depressive symptoms, anxiety, SI, recent inpatient discharge  Protective: supportive family/network, engaged in treatment, help-seeking, future-oriented, established care in outpatient

## 2022-07-28 NOTE — BH INPATIENT PSYCHIATRY ASSESSMENT NOTE - LEVEL OF CONSCIOUSNESS
[FreeTextEntry1] : elevated PSA\par given MRI and slow rise will hol doff on biopsy\par reassured\par PSA 6months\par f/u after\par 
Alert

## 2022-09-29 ENCOUNTER — NON-APPOINTMENT (OUTPATIENT)
Age: 22
End: 2022-09-29

## 2022-09-29 ENCOUNTER — APPOINTMENT (OUTPATIENT)
Dept: PULMONOLOGY | Facility: CLINIC | Age: 22
End: 2022-09-29

## 2022-09-29 VITALS — OXYGEN SATURATION: 98 % | SYSTOLIC BLOOD PRESSURE: 122 MMHG | HEART RATE: 96 BPM | DIASTOLIC BLOOD PRESSURE: 88 MMHG

## 2022-09-29 DIAGNOSIS — Z83.438 FAMILY HISTORY OF OTHER DISORDER OF LIPOPROTEIN METABOLISM AND OTHER LIPIDEMIA: ICD-10-CM

## 2022-09-29 PROCEDURE — 99202 OFFICE O/P NEW SF 15 MIN: CPT

## 2022-09-29 NOTE — ASSESSMENT
[FreeTextEntry1] : History of anxiety and depression along with subjective insomnia.  High likelihood of sleep stage misperception which should be assessed formally by sleep  study however I do believe that sleep apnea should be ruled out first because of body habitus issues family history of coronary disease and significant sleep disruption.  Also should reduce caffeine intake as she is drinking at least 20 ounces of caffeinated beverages per day and this is likely contributing to some degree of her sleep maintenance insomnia.

## 2022-09-29 NOTE — HISTORY OF PRESENT ILLNESS
[Never] : never [TextBox_4] : trouble sleeping\par duloxitine- anxiety, depression\par nightmares\par dreams that cause anxiety\par not sleep apnea dreams\par no snoring\par migraines, lack of sleep\par \par goes to bed 9:45- 10;45\par sleep latency 15 min\par typical 3-5 times awakening\par awakening can last 30 min, longer\par \par often lays in bed\par listens to podcasts\par drinks 20 oz coffee/day\par \par Family history of heart disease
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
## 2022-10-25 ENCOUNTER — APPOINTMENT (OUTPATIENT)
Dept: PULMONOLOGY | Facility: CLINIC | Age: 22
End: 2022-10-25

## 2022-10-25 PROCEDURE — 95800 SLP STDY UNATTENDED: CPT

## 2022-10-26 PROCEDURE — 95800 SLP STDY UNATTENDED: CPT

## 2022-11-14 ENCOUNTER — APPOINTMENT (OUTPATIENT)
Dept: PULMONOLOGY | Facility: CLINIC | Age: 22
End: 2022-11-14

## 2022-11-14 PROCEDURE — 99212 OFFICE O/P EST SF 10 MIN: CPT | Mod: 95

## 2022-11-14 NOTE — BH INPATIENT PSYCHIATRY ASSESSMENT NOTE - NSBHMSETHTPROC_PSY_A_CORE
----- Message from Elsa Mccoy sent at 11/14/2022  9:27 AM CST -----  PERSON CALLING: BRIDGETTE [phone]      FEVER SINCE YESTERDAY   COUGH UNCONTROLLED  CONGESTION     
Linear
Pt confused - unable to provide information

## 2022-11-15 NOTE — HISTORY OF PRESENT ILLNESS
[TextBox_4] : Telehealth visit to review results of home sleep study.  Patient presents with symptoms of insomnia as symptoms are suggestive of VICTOR M.  Plan was to proceed with sleep  study but first to home sleep study to rule out VICTOR M.\par \par Home sleep study performed and results reviewed with patient no evidence of significant sleep apnea.  Note relatively normal sleep efficiency which suggest diagnosis of paradoxical insomnia however this should be confirmed with  study which will be arranged

## 2022-11-16 ENCOUNTER — APPOINTMENT (OUTPATIENT)
Dept: PULMONOLOGY | Facility: CLINIC | Age: 22
End: 2022-11-16

## 2022-11-16 PROCEDURE — 95819 EEG AWAKE AND ASLEEP: CPT

## 2022-11-18 PROCEDURE — 95819 EEG AWAKE AND ASLEEP: CPT

## 2022-11-22 ENCOUNTER — APPOINTMENT (OUTPATIENT)
Dept: PULMONOLOGY | Facility: CLINIC | Age: 22
End: 2022-11-22

## 2022-11-22 VITALS
HEIGHT: 64 IN | HEART RATE: 114 BPM | SYSTOLIC BLOOD PRESSURE: 118 MMHG | BODY MASS INDEX: 28.17 KG/M2 | OXYGEN SATURATION: 97 % | WEIGHT: 165 LBS | DIASTOLIC BLOOD PRESSURE: 86 MMHG

## 2022-11-22 PROCEDURE — 99213 OFFICE O/P EST LOW 20 MIN: CPT

## 2022-11-22 RX ORDER — PRAZOSIN HYDROCHLORIDE 1 MG/1
1 CAPSULE ORAL
Qty: 60 | Refills: 0 | Status: COMPLETED | COMMUNITY
Start: 2022-07-01

## 2022-11-22 RX ORDER — PRAZOSIN HYDROCHLORIDE 2 MG/1
2 CAPSULE ORAL
Qty: 60 | Refills: 0 | Status: COMPLETED | COMMUNITY
Start: 2022-07-01

## 2022-11-23 NOTE — HISTORY OF PRESENT ILLNESS
[TextBox_4] : Follow-up sleep evaluation presented with nonrestorative sleep with sleep fragmentation and perceived insomnia.  Underwent initial home sleep study negative for VICTOR M.  Had follow-up sleep  study current visit for review\par \par Sleep  study shows evidence of paradoxical insomnia with significant sleep stage misperception and sleep fragmentation.\par \par Reviewed results with patient also reviewed the fact that she has had consistently negative responses to sleep medications.  Overall the home sleep study was negative I am reopening the question that she may have VICTOR M possibly upper airways resistance syndrome recommend in lab polysomnography for further assessment

## 2022-11-29 ENCOUNTER — APPOINTMENT (OUTPATIENT)
Dept: PULMONOLOGY | Facility: CLINIC | Age: 22
End: 2022-11-29

## 2022-12-06 NOTE — BH PSYCHOLOGY - GROUP THERAPY NOTE - NSBHPSYCHOLGRPNAME_PSY_A_CORE
DBT Skills
DBT Homework
DBT Skills
Developing Social Supports
DBT Homework
DBT Skills
Yes past 3 months
Yes > 3 months ago

## 2022-12-20 ENCOUNTER — LABORATORY RESULT (OUTPATIENT)
Age: 22
End: 2022-12-20

## 2022-12-20 ENCOUNTER — APPOINTMENT (OUTPATIENT)
Dept: INTERNAL MEDICINE | Facility: CLINIC | Age: 22
End: 2022-12-20

## 2022-12-20 VITALS
OXYGEN SATURATION: 100 % | SYSTOLIC BLOOD PRESSURE: 127 MMHG | BODY MASS INDEX: 28.17 KG/M2 | HEART RATE: 97 BPM | TEMPERATURE: 98.5 F | HEIGHT: 64 IN | WEIGHT: 165 LBS | DIASTOLIC BLOOD PRESSURE: 83 MMHG

## 2022-12-20 DIAGNOSIS — E78.5 HYPERLIPIDEMIA, UNSPECIFIED: ICD-10-CM

## 2022-12-20 DIAGNOSIS — G47.00 INSOMNIA, UNSPECIFIED: ICD-10-CM

## 2022-12-20 PROCEDURE — 36415 COLL VENOUS BLD VENIPUNCTURE: CPT

## 2022-12-20 PROCEDURE — 99385 PREV VISIT NEW AGE 18-39: CPT | Mod: 25

## 2022-12-20 NOTE — HISTORY OF PRESENT ILLNESS
[FreeTextEntry1] : annual well visit [de-identified] : 21 year old female hx of high cholesterol, anxiety and depression here for annual well visit. Following Dr. Jose Malik out of [facility] for anxiety and depression. Patient was hospitalized 4 times this year for anxiety. Still suffering from insonmia.  patient going to school in [place] and working PT as an MA at [employer].

## 2022-12-20 NOTE — HEALTH RISK ASSESSMENT
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

## 2022-12-20 NOTE — ADDENDUM
[FreeTextEntry1] : blood and urine obtained\par discussed buspar with patient to f/u with psychologist.

## 2022-12-22 ENCOUNTER — TRANSCRIPTION ENCOUNTER (OUTPATIENT)
Age: 22
End: 2022-12-22

## 2022-12-22 LAB
25(OH)D3 SERPL-MCNC: 26.1 NG/ML
ALBUMIN SERPL ELPH-MCNC: 4.5 G/DL
ALP BLD-CCNC: 52 U/L
ALT SERPL-CCNC: 15 U/L
ANION GAP SERPL CALC-SCNC: 11 MMOL/L
APPEARANCE: CLEAR
AST SERPL-CCNC: 18 U/L
BASOPHILS # BLD AUTO: 0.06 K/UL
BASOPHILS NFR BLD AUTO: 1 %
BILIRUB SERPL-MCNC: 0.6 MG/DL
BILIRUBIN URINE: NEGATIVE
BLOOD URINE: NEGATIVE
BUN SERPL-MCNC: 10 MG/DL
CALCIUM SERPL-MCNC: 10.1 MG/DL
CHLORIDE SERPL-SCNC: 100 MMOL/L
CHOLEST SERPL-MCNC: 193 MG/DL
CO2 SERPL-SCNC: 26 MMOL/L
COLOR: COLORLESS
CREAT SERPL-MCNC: 0.64 MG/DL
EGFR: 128 ML/MIN/1.73M2
EOSINOPHIL # BLD AUTO: 0.07 K/UL
EOSINOPHIL NFR BLD AUTO: 1.2 %
ESTIMATED AVERAGE GLUCOSE: 94 MG/DL
FOLATE SERPL-MCNC: 9.6 NG/ML
GLUCOSE QUALITATIVE U: NEGATIVE
GLUCOSE SERPL-MCNC: 77 MG/DL
HBA1C MFR BLD HPLC: 4.9 %
HCT VFR BLD CALC: 43.3 %
HDLC SERPL-MCNC: 76 MG/DL
HGB BLD-MCNC: 14.9 G/DL
IMM GRANULOCYTES NFR BLD AUTO: 0.3 %
KETONES URINE: NEGATIVE
LDLC SERPL CALC-MCNC: 86 MG/DL
LEUKOCYTE ESTERASE URINE: ABNORMAL
LYMPHOCYTES # BLD AUTO: 1.79 K/UL
LYMPHOCYTES NFR BLD AUTO: 31.1 %
MAGNESIUM SERPL-MCNC: 2.1 MG/DL
MAN DIFF?: NORMAL
MCHC RBC-ENTMCNC: 29.7 PG
MCHC RBC-ENTMCNC: 34.4 GM/DL
MCV RBC AUTO: 86.3 FL
MONOCYTES # BLD AUTO: 0.64 K/UL
MONOCYTES NFR BLD AUTO: 11.1 %
NEUTROPHILS # BLD AUTO: 3.17 K/UL
NEUTROPHILS NFR BLD AUTO: 55.3 %
NITRITE URINE: NEGATIVE
NONHDLC SERPL-MCNC: 117 MG/DL
PH URINE: 7
PLATELET # BLD AUTO: 200 K/UL
POTASSIUM SERPL-SCNC: 4.8 MMOL/L
PROT SERPL-MCNC: 7.4 G/DL
PROTEIN URINE: NEGATIVE
RBC # BLD: 5.02 M/UL
RBC # FLD: 12.5 %
SODIUM SERPL-SCNC: 136 MMOL/L
SPECIFIC GRAVITY URINE: 1.01
TRIGL SERPL-MCNC: 154 MG/DL
TSH SERPL-ACNC: 1.28 UIU/ML
UROBILINOGEN URINE: NORMAL
VIT B12 SERPL-MCNC: 389 PG/ML
WBC # FLD AUTO: 5.75 K/UL

## 2023-01-01 NOTE — BH PSYCHOLOGY - GROUP THERAPY NOTE - NSPSYCHOLGRPGENPT_PSY_A_CORE FT
[employer] is an ACT-based group in which patients learn skills and explore themes of identity, compassion, resilience, and connection through the lens of marginalized identity. Group begins with setting group expectations and introductions that focus on identity exploration. Following introductions, the daily theme is presented through both didactics and experiential activities. Group today focused on the theme “compassion.” Patients were provided psychoeducation about compassion and engaged in discussion about the usefulness of self-compassion in their own lives.  led patients in discussions surrounding feelings of shame and marginalized identity, highlighting the role self-compassion can play in alleviating suffering.   taught two new coping strategies to assist in building self-compassion and defusing shame.   6

## 2023-01-24 ENCOUNTER — APPOINTMENT (OUTPATIENT)
Dept: PULMONOLOGY | Facility: CLINIC | Age: 23
End: 2023-01-24
Payer: COMMERCIAL

## 2023-01-24 VITALS
SYSTOLIC BLOOD PRESSURE: 113 MMHG | RESPIRATION RATE: 16 BRPM | OXYGEN SATURATION: 99 % | DIASTOLIC BLOOD PRESSURE: 80 MMHG | HEART RATE: 100 BPM

## 2023-01-24 PROCEDURE — 99213 OFFICE O/P EST LOW 20 MIN: CPT

## 2023-01-24 NOTE — PHYSICAL EXAM
[Normal Oropharynx] : normal oropharynx [Heart Rate And Rhythm] : heart rate was normal and rhythm regular [Heart Sounds] : normal S1 and S2 [Heart Sounds Gallop] : no gallops [Murmurs] : no murmurs [Heart Sounds Pericardial Friction Rub] : no pericardial rub [] : no respiratory distress [Auscultation Breath Sounds / Voice Sounds] : lungs were clear to auscultation bilaterally

## 2023-01-26 NOTE — HISTORY OF PRESENT ILLNESS
[TextBox_4] : Follow-up for sleep apnea office visit for review of laboratory sleep study.  Continues to complain of daytime sleepiness and nonrestorative sleep [FreeTextEntry1] : F/u \par \par Patient here to review sleep study results; no changes since previous visit

## 2023-01-26 NOTE — ASSESSMENT
[FreeTextEntry1] : Patient to start APAP; order has been sent \par \par F/u via telehealth one week after receiving and utilizing the device

## 2023-01-26 NOTE — PROCEDURE
[FreeTextEntry1] : Patient shows to have severe VICTOR M with AHI of 30.9\par \par The pathophysiology as well as medical implications of untreated obstructive sleep apnea syndrome were discussed with this patient. Treatment options including CPAP therapy, Inspire,  mandibular advancement device and weight loss were also discussed; patient agreed to start CPAP treatment; various masks discusse with patient and will be using the nasal pillow mask

## 2023-02-17 ENCOUNTER — APPOINTMENT (OUTPATIENT)
Dept: PULMONOLOGY | Facility: CLINIC | Age: 23
End: 2023-02-17
Payer: COMMERCIAL

## 2023-02-17 PROCEDURE — 99213 OFFICE O/P EST LOW 20 MIN: CPT | Mod: 95

## 2023-02-18 NOTE — HISTORY OF PRESENT ILLNESS
[TextBox_4] : Telehealth follow-up for sleep apnea currently using auto CPAP tolerating well.  Still with symptoms of nonrestorative sleep.  Tolerating PAP well

## 2023-02-18 NOTE — REASON FOR VISIT
[Home] : at home, [unfilled] , at the time of the visit. [Medical Office: ([facility])___] : at the medical office located in  [Patient] : the patient

## 2023-02-18 NOTE — ASSESSMENT
[FreeTextEntry1] : Initial compliance appears very good with increased pressure from 5-10 to 7-12 range

## 2023-02-18 NOTE — PROCEDURE
[FreeTextEntry1] : Usage days 8/8 days (100%)\par >= 4 hours 8 days (100%)\par < 4 hours 0 days (0%)\par Usage hours 77 hours 36 minutes\par Average usage (total days) 9 hours 42 minutes\par Average usage (days used) 9 hours 42 minutes\par Median usage (days used) 9 hours 54 minutes\par Total used hours (value since last reset - 02/16/2023) 95 hours\par AirSense 10 AutoSet\par Serial number [id number]\par Mode AutoSet\par Min Pressure 5 cmH2O\par Max Pressure 10 cmH2O\par EPR Off\par Response Standard\par Therapy\par Pressure - cmH2O Median: 6.9 95th percentile: 8.9 Maximum: 9.6\par Leaks - L/min Median: 0.0 95th percentile: 10.2 Maximum: 37.4\par Events per hour AI: 0.7 HI: 0.2 AHI: 0.9\par Apnea Index Central: 0.2 Obstructive: 0.4 Unknown: 0.1\par RERA Index 0.1\par Cheyne-Saucedo respiration (average duration per night) 0 minutes (0%)

## 2023-03-16 ENCOUNTER — APPOINTMENT (OUTPATIENT)
Dept: PULMONOLOGY | Facility: CLINIC | Age: 23
End: 2023-03-16
Payer: COMMERCIAL

## 2023-03-16 VITALS — OXYGEN SATURATION: 98 % | SYSTOLIC BLOOD PRESSURE: 114 MMHG | DIASTOLIC BLOOD PRESSURE: 79 MMHG | HEART RATE: 96 BPM

## 2023-03-16 PROCEDURE — 99213 OFFICE O/P EST LOW 20 MIN: CPT

## 2023-03-16 NOTE — ADDENDUM
[FreeTextEntry1] : I, Pauline Mitchelldavid, acted solely as a scribe for Dr. Qasim Gerardo M.D. on this date 03/16/2023. \par \par All medical record entries made by the Scribe were at my, Dr. Qasim Gerardo M.D., direction and personally dictated by me on 03/16/2023. I have reviewed the chart and agree that the record accurately reflects my personal performance of the history, physical exam, assessment and plan. I have also personally directed, reviewed, and agreed with the chart.

## 2023-03-16 NOTE — ASSESSMENT
[FreeTextEntry1] : Ms. JEF CROW is an 22 year old female with Obstructive sleep apnea on CPAP. Patient is currently on treatment with PAP. Data download confirms excellent compliance. Patient continues to use treatment and is benefiting from it. Provided her with a nasal mask and changed her pressure setting to a fixed pressure of 10 will improve CPAP tolerance\par \par Follow up in 3 months unless her issues persists, in which case, she should follow up with me to receive a lab titration sleep study for further evaluation of the most effective pressure setting for sleep apnea treatment.

## 2023-03-16 NOTE — PROCEDURE
[FreeTextEntry1] : Compliance Report\par Compliance\par Payor Standard\par Usage 02/13/2023 - 03/14/2023\par Usage days 30/30 days (100%)\par >= 4 hours 30 days (100%)\par < 4 hours 0 days (0%)\par Usage hours 279 hours 58 minutes\par Average usage (total days) 9 hours 20 minutes\par Average usage (days used) 9 hours 20 minutes\par Median usage (days used) 9 hours 35 minutes\par Total used hours (value since last reset - 03/14/2023) 335 hours\par AirSense 10 AutoSet\par Serial number [id number]\par Mode AutoSet\par Min Pressure 7 cmH2O\par Max Pressure 10 cmH2O\par EPR Off\par Response Standard\par Therapy\par Pressure - cmH2O Median: 9.1 95th percentile: 10.5 Maximum: 10.9\par Leaks - L/min Median: 0.6 95th percentile: 21.4 Maximum: 58.0\par Events per hour AI: 0.8 HI: 0.2 AHI: 1.0\par Apnea Index Central: 0.3 Obstructive: 0.4 Unknown: 0.1\par RERA Index 0.2\par Cheyne-Saucedo respiration (average duration per night) 0 minutes (0%)

## 2023-03-16 NOTE — HISTORY OF PRESENT ILLNESS
[TextBox_4] : JEF CROW is a 22 year old female, with history of Obstructive sleep apnea, who presents to the office for follow up evaluation. Patient reports that she continues to use her CPAP machine with some difficulties. She states of having better sleep after the first week where her machine pressure was increased but worsened after the next weeks leading up to this visit. Patient states that she "tosses and turns" during sleep and does not feel restored afterwards. She reports of having dryness around her nose after waking up which she states "hurts" on palpation. \par \par Device: Resmed S10 AutoSet\par \par Settin-10\par \par Mask: Nasal Pillows\par \par Issues: Worsening sleep since the 2nd week of her pressure setting being increased. Non-restorative sleep. Nose dryness.

## 2023-03-28 ENCOUNTER — APPOINTMENT (OUTPATIENT)
Dept: PULMONOLOGY | Facility: CLINIC | Age: 23
End: 2023-03-28
Payer: COMMERCIAL

## 2023-03-28 PROCEDURE — 99212 OFFICE O/P EST SF 10 MIN: CPT | Mod: 95

## 2023-03-28 NOTE — HISTORY OF PRESENT ILLNESS
[TextBox_4] : Telehealth visit for sleep apnea.  At last visit changed from auto CPAP to CPAP 10 fixed pressure.  Also changed to nasal mask.  Does not like the nasal mask as she cannot sleep on her side with.  She is not sleeping well so she cannot comment on the change in pressure setting.  Advised to change back to nasal pillow mask and to feedback through portal regarding response to mask change and whether the patient needs a setting change or titration study

## 2023-03-28 NOTE — REASON FOR VISIT
[Home] : at home, [unfilled] , at the time of the visit. [Medical Office: ([facility])___] : at the medical office located in  [Spouse] : spouse

## 2023-03-31 NOTE — ED BEHAVIORAL HEALTH ASSESSMENT NOTE - TELEPSYCHIATRY?
----- Message from Mary Ellen Gordon sent at 3/31/2023  9:58 AM CDT -----  Regarding: MRI  JANETTE/LYNN    Pt states she is scheduled for a MRI and is claustrophobic.   Pt requesting rx for anxiety.     No

## 2023-04-11 ENCOUNTER — APPOINTMENT (OUTPATIENT)
Dept: PULMONOLOGY | Facility: CLINIC | Age: 23
End: 2023-04-11
Payer: COMMERCIAL

## 2023-04-11 VITALS
HEART RATE: 120 BPM | RESPIRATION RATE: 14 BRPM | OXYGEN SATURATION: 96 % | SYSTOLIC BLOOD PRESSURE: 120 MMHG | DIASTOLIC BLOOD PRESSURE: 81 MMHG

## 2023-04-11 PROCEDURE — 94660 CPAP INITIATION&MGMT: CPT

## 2023-05-23 NOTE — ED BEHAVIORAL HEALTH ASSESSMENT NOTE - NS ED BHA PLAN ADMIT TO PSYCHIATRY REFERRANT CONTACTED YN
Immunization chart prep completed.  Immunization records verified.  Juventino Mcmillan due for Arnold  
Yes

## 2023-05-30 ENCOUNTER — TRANSCRIPTION ENCOUNTER (OUTPATIENT)
Age: 23
End: 2023-05-30

## 2023-06-09 ENCOUNTER — TRANSCRIPTION ENCOUNTER (OUTPATIENT)
Age: 23
End: 2023-06-09

## 2023-06-15 ENCOUNTER — APPOINTMENT (OUTPATIENT)
Dept: PULMONOLOGY | Facility: CLINIC | Age: 23
End: 2023-06-15
Payer: COMMERCIAL

## 2023-06-15 VITALS — OXYGEN SATURATION: 98 % | SYSTOLIC BLOOD PRESSURE: 117 MMHG | DIASTOLIC BLOOD PRESSURE: 81 MMHG | HEART RATE: 118 BPM

## 2023-06-15 PROCEDURE — 99213 OFFICE O/P EST LOW 20 MIN: CPT

## 2023-06-15 NOTE — ADDENDUM
Spoke with patient at bedside,pt does not want to go to [facility] unless she have to,cm has reached out to other facilities and also inform them that pt will require Bi-pap machine, settings also downloaded, waiting on acceptance for facilities. [FreeTextEntry1] : I, Pauline Mitchelldavid, acted solely as a scribe for Dr. Qasim Gerardo M.D. on this date 06/15/2023. \par \par All medical record entries made by the Scribe were at my, Dr. Qasim Gerardo M.D., direction and personally dictated by me on 06/15/2023. I have reviewed the chart and agree that the record accurately reflects my personal performance of the history, physical exam, assessment and plan. I have also personally directed, reviewed, and agreed with the chart.

## 2023-06-15 NOTE — PROCEDURE
[FreeTextEntry1] : Compliance Report\par Compliance\par Payor Standard\par Usage 05/16/2023 - 06/14/2023\par Usage days 30/30 days (100%)\par >= 4 hours 30 days (100%)\par < 4 hours 0 days (0%)\par Usage hours 251 hours 37 minutes\par Average usage (total days) 8 hours 23 minutes\par Average usage (days used) 8 hours 23 minutes\par Median usage (days used) 8 hours 40 minutes\par Total used hours (value since last reset - 06/14/2023) 1,142 hours\par AirSense 10 AutoSet\par Serial number [id number]\par Mode CPAP\par Set pressure 10 cmH2O\par EPR Off\par Therapy\par Leaks - L/min Median: 2.7 95th percentile: 37.5 Maximum: 80.6\par Events per hour AI: 1.0 HI: 0.5 AHI: 1.5\par Apnea Index Central: 0.5 Obstructive: 0.2 Unknown: 0.2\par RERA Index 0.2\par Cheyne-Saucedo respiration (average duration per night) 0 minutes (0%)

## 2023-06-15 NOTE — HISTORY OF PRESENT ILLNESS
[FreeTextEntry1] : JEF CROW is a 22 year old female, with history of obstructive sleep apnea, who presents to the office for follow up evaluation. Reports no current respiratory symptoms or sleep symptoms. Using PAP on a nightly basis without much difficulty. She states of having occasional problems of leakage due to poor mask fit. Reports that her symptoms of daytime sleepiness have improved. Getting supplies regularly. She is inquiring about obtaining a travel PAP machine as she plans on moving to her own apartment but plans to travel back and forth and would need a machine at both sites.\par \par Device: Resmed S10 AutoSet\par \par Setting: CPAP 10\par \par Mask: Nasal pillows\par \par Issues: Occasional leakage due to poor mask fit.  Otherwise doing well on treatment

## 2023-06-15 NOTE — ASSESSMENT
[FreeTextEntry1] : Ms. JEF CROW is an 22 year old female with obstructive sleep apnea. Patient is currently on treatment with PAP. Data download confirms excellent compliance. Patient continues to use treatment and is benefiting from it. Intermittent leakage indicated in downloaded data.\par \par Patient is a good candidate for Inspire treatment although she appears to be tolerating CPAP treatment well.  It would be an option for her in the future if her CPAP tolerance deteriorates

## 2023-08-03 NOTE — BH CONSULTATION LIAISON PROGRESS NOTE - NSBHINDICATION_PSY_ALL_CORE
recent suicide attempt Bed/Stretcher in lowest position, wheels locked, appropriate side rails in place/Call bell, personal items and telephone in reach/Instruct patient to call for assistance before getting out of bed/chair/stretcher/Non-slip footwear applied when patient is off stretcher/[place] to call system/Physically safe environment - no spills, clutter or unnecessary equipment/Purposeful proactive rounding/Room/bathroom lighting operational, light cord in reach

## 2023-08-24 RX ORDER — ATORVASTATIN CALCIUM 10 MG/1
10 TABLET, FILM COATED ORAL
Qty: 30 | Refills: 0 | Status: ACTIVE | COMMUNITY
Start: 2023-06-09 | End: 1900-01-01

## 2023-09-22 ENCOUNTER — APPOINTMENT (OUTPATIENT)
Dept: PULMONOLOGY | Facility: CLINIC | Age: 23
End: 2023-09-22
Payer: COMMERCIAL

## 2023-09-22 VITALS
HEIGHT: 64 IN | TEMPERATURE: 98.4 F | SYSTOLIC BLOOD PRESSURE: 129 MMHG | WEIGHT: 165 LBS | HEART RATE: 108 BPM | DIASTOLIC BLOOD PRESSURE: 83 MMHG | BODY MASS INDEX: 28.17 KG/M2 | OXYGEN SATURATION: 100 %

## 2023-09-22 PROCEDURE — 99213 OFFICE O/P EST LOW 20 MIN: CPT

## 2023-10-09 NOTE — BH PATIENT PROFILE - NSTRAUMAHX_PSY_ALL_CORE
SW requested to confirm pt HD chair time. Per [facility], they stated they could accommodate pt beginning 10/10 but pt is currently receiving dialysis at [facility]. SW followed up via phone ([phone]) and email to Anjana at [facility] just to confirm the time again. TCC notified.    - Yumiko Pierre MSW, LSW     No

## 2023-10-27 ENCOUNTER — LABORATORY RESULT (OUTPATIENT)
Age: 23
End: 2023-10-27

## 2023-10-27 ENCOUNTER — APPOINTMENT (OUTPATIENT)
Dept: INTERNAL MEDICINE | Facility: CLINIC | Age: 23
End: 2023-10-27
Payer: COMMERCIAL

## 2023-10-27 VITALS
HEART RATE: 88 BPM | DIASTOLIC BLOOD PRESSURE: 81 MMHG | HEIGHT: 64 IN | BODY MASS INDEX: 27.49 KG/M2 | TEMPERATURE: 98.2 F | SYSTOLIC BLOOD PRESSURE: 115 MMHG | WEIGHT: 161 LBS | OXYGEN SATURATION: 100 %

## 2023-10-27 DIAGNOSIS — Z00.00 ENCOUNTER FOR GENERAL ADULT MEDICAL EXAMINATION W/OUT ABNORMAL FINDINGS: ICD-10-CM

## 2023-10-27 PROCEDURE — 99395 PREV VISIT EST AGE 18-39: CPT | Mod: 25

## 2023-10-27 PROCEDURE — 36415 COLL VENOUS BLD VENIPUNCTURE: CPT

## 2023-10-27 RX ORDER — LORAZEPAM 0.5 MG/1
0.5 TABLET ORAL
Refills: 0 | Status: ACTIVE | COMMUNITY
Start: 2023-10-27

## 2023-10-27 RX ORDER — DULOXETINE HYDROCHLORIDE 60 MG/1
60 CAPSULE, DELAYED RELEASE PELLETS ORAL
Qty: 90 | Refills: 0 | Status: ACTIVE | COMMUNITY
Start: 2023-10-27

## 2023-10-27 RX ORDER — HYDROXYZINE HYDROCHLORIDE 25 MG/1
25 TABLET ORAL
Refills: 0 | Status: ACTIVE | COMMUNITY
Start: 2023-10-27

## 2023-10-27 RX ORDER — DROSPIRENONE AND ETHINYL ESTRADIOL 0.02-3(28)
3-0.02 KIT ORAL DAILY
Refills: 0 | Status: ACTIVE | COMMUNITY
Start: 2023-10-27

## 2023-10-27 RX ORDER — TRAZODONE HYDROCHLORIDE 50 MG/1
50 TABLET ORAL
Qty: 90 | Refills: 3 | Status: ACTIVE | COMMUNITY
Start: 2023-10-27

## 2023-11-01 LAB
25(OH)D3 SERPL-MCNC: 38.7 NG/ML
ALBUMIN SERPL ELPH-MCNC: 4.6 G/DL
ALP BLD-CCNC: 51 U/L
ALT SERPL-CCNC: 18 U/L
ANION GAP SERPL CALC-SCNC: 10 MMOL/L
APPEARANCE: CLEAR
AST SERPL-CCNC: 19 U/L
BASOPHILS # BLD AUTO: 0.04 K/UL
BASOPHILS NFR BLD AUTO: 0.9 %
BILIRUB SERPL-MCNC: 0.6 MG/DL
BILIRUBIN URINE: NEGATIVE
BLOOD URINE: NEGATIVE
BUN SERPL-MCNC: 9 MG/DL
CALCIUM SERPL-MCNC: 10 MG/DL
CHLORIDE SERPL-SCNC: 103 MMOL/L
CHOLEST SERPL-MCNC: 181 MG/DL
CO2 SERPL-SCNC: 26 MMOL/L
COLOR: YELLOW
CREAT SERPL-MCNC: 0.65 MG/DL
EGFR: 127 ML/MIN/1.73M2
EOSINOPHIL # BLD AUTO: 0.05 K/UL
EOSINOPHIL NFR BLD AUTO: 1.1 %
ESTIMATED AVERAGE GLUCOSE: 91 MG/DL
FOLATE SERPL-MCNC: >20 NG/ML
GLUCOSE QUALITATIVE U: NEGATIVE MG/DL
GLUCOSE SERPL-MCNC: 82 MG/DL
HBA1C MFR BLD HPLC: 4.8 %
HCT VFR BLD CALC: 41.8 %
HDLC SERPL-MCNC: 76 MG/DL
HGB BLD-MCNC: 14.2 G/DL
IMM GRANULOCYTES NFR BLD AUTO: 0.4 %
KETONES URINE: NEGATIVE MG/DL
LDLC SERPL CALC-MCNC: 84 MG/DL
LEUKOCYTE ESTERASE URINE: ABNORMAL
LYMPHOCYTES # BLD AUTO: 1.59 K/UL
LYMPHOCYTES NFR BLD AUTO: 34.4 %
MAGNESIUM SERPL-MCNC: 2.2 MG/DL
MAN DIFF?: NORMAL
MCHC RBC-ENTMCNC: 29.8 PG
MCHC RBC-ENTMCNC: 34 GM/DL
MCV RBC AUTO: 87.6 FL
MONOCYTES # BLD AUTO: 0.59 K/UL
MONOCYTES NFR BLD AUTO: 12.8 %
NEUTROPHILS # BLD AUTO: 2.33 K/UL
NEUTROPHILS NFR BLD AUTO: 50.4 %
NITRITE URINE: NEGATIVE
NONHDLC SERPL-MCNC: 105 MG/DL
PH URINE: 7.5
PLATELET # BLD AUTO: 210 K/UL
POTASSIUM SERPL-SCNC: 4.4 MMOL/L
PROT SERPL-MCNC: 7.5 G/DL
PROTEIN URINE: NEGATIVE MG/DL
RBC # BLD: 4.77 M/UL
RBC # FLD: 12.7 %
SODIUM SERPL-SCNC: 139 MMOL/L
SPECIFIC GRAVITY URINE: 1.01
TRIGL SERPL-MCNC: 123 MG/DL
TSH SERPL-ACNC: 1.35 UIU/ML
UROBILINOGEN URINE: 0.2 MG/DL
VIT B12 SERPL-MCNC: 884 PG/ML
WBC # FLD AUTO: 4.62 K/UL

## 2023-12-04 NOTE — BH DISCHARGE NOTE NURSING/SOCIAL WORK/PSYCH REHAB - NSBHREFERPURPOSE2_PSY_ALL_CORE
Pt arrives ambulatory to triage.  Pt reports headache and dizziness since around 2300 12/3/23.  Pt reports no OTC medications used.  Pt reports nausea, no emesis yet.  Pt denies drug or alcohol use today.    Pt reports something similar a few months ago, does not recall what resulted.   Mental Health Treatment

## 2024-03-18 NOTE — H&P ADULT - NS ATTEND OPT1 GEN_ALL_CORE
----- Message from SHANIA Vincent - CNP sent at 3/18/2024 11:19 AM EDT -----  +BV and candida  Flagyl 500mg PO bID x7 days and diflucan 150mg PO x 1 now and repeat in 7 days   I attest my time as attending is greater than 50% of the total combined time spent on qualifying patient care activities by the PA/NP and attending.

## 2024-03-22 ENCOUNTER — APPOINTMENT (OUTPATIENT)
Dept: PULMONOLOGY | Facility: CLINIC | Age: 24
End: 2024-03-22
Payer: COMMERCIAL

## 2024-03-22 DIAGNOSIS — F41.9 ANXIETY DISORDER, UNSPECIFIED: ICD-10-CM

## 2024-03-22 DIAGNOSIS — G47.33 OBSTRUCTIVE SLEEP APNEA (ADULT) (PEDIATRIC): ICD-10-CM

## 2024-03-22 DIAGNOSIS — F32.A ANXIETY DISORDER, UNSPECIFIED: ICD-10-CM

## 2024-03-22 PROCEDURE — 99213 OFFICE O/P EST LOW 20 MIN: CPT

## 2024-03-22 NOTE — ASSESSMENT
[FreeTextEntry1] : Patient is currently on treatment with PAP. Data download confirms excellent compliance. Patient continues to use treatment and is benefiting from it. Will increase CPAP pressure to CPAP 12 to see if this has an effect on the patient if not may need to reassess use of trazodone

## 2024-03-22 NOTE — REASON FOR VISIT
[Home] : at home, [unfilled] , at the time of the visit. [Medical Office: ([facility])___] : at the medical office located in  [Patient] : the patient

## 2024-03-22 NOTE — HISTORY OF PRESENT ILLNESS
[TextBox_4] : History of obstructive sleep apnea currently on CPAP 10.  Started on trazodone several months ago for sleep.  Notes she sleeps better with the trazodone however she is more sleepy during the daytime she is also waking up with more frequent migraines.  No change in weight no nasal symptoms.

## 2024-04-15 ENCOUNTER — TRANSCRIPTION ENCOUNTER (OUTPATIENT)
Age: 24
End: 2024-04-15

## 2024-04-17 ENCOUNTER — TRANSCRIPTION ENCOUNTER (OUTPATIENT)
Age: 24
End: 2024-04-17

## 2024-05-10 NOTE — BH CONSULTATION LIAISON PROGRESS NOTE - NSICDXBHPRIMARYDX_PSY_ALL_CORE
"Discharge Planning Assessment Post Partum    Reason for Referral: History of anxiety and depression   Address: [place] [address]  Phone: [phone]  Type of Living Situation: stable housing   Mom Diagnosis: Pregnancy, vaginal delivery   Baby Diagnosis: [place]-37.3 weeks   Primary Language: English     Name of Baby: Northwood \"Alba\" Elle (: 24)   Father of the Baby: Rio Almeida   Involved in baby’s care? Yes  Contact Information: [phone]    Prenatal Care:  Yes-Dr. Marie   Mom's PCP: Dr. Elsi Rolle  PCP for new baby: Pediatrician list provided     Support System: FOB  Coping/Bonding between mother & baby: Yes  Source of Feeding: breast feeding   Supplies for Infant: prepared for infant     Mom's Insurance: [place]   Baby 
Birth certificate has been completed.  
Major depression   F32.9  

## 2024-07-23 NOTE — PROGRESS NOTE ADULT - NUTRITIONAL ASSESSMENT
Patient had a normal cath in March 2024.  Appreciate GI consult.  EGD showed mild erythema; will place on Protonix BID.  Will hold ASA  
This patient has been assessed with a concern for Malnutrition and has been determined to have a diagnosis/diagnoses of Severe protein-calorie malnutrition.    This patient is being managed with:   Diet Regular-  Kosher  Entered: Apr 24 2022  4:09PM    

## 2024-07-24 ENCOUNTER — RESULT REVIEW (OUTPATIENT)
Age: 24
End: 2024-07-24

## 2024-08-07 NOTE — ED BEHAVIORAL HEALTH ASSESSMENT NOTE - ACCOMPANIED BY
PPT met bar w adhesive - medium right & left    PPT arch pad w/ adhesive - small    Visco-gel Silicone thin forefoot cushion - small left & right    Visco-gel universal metatarsal strap - small/ medium right & left1-   Family member

## 2025-04-04 ENCOUNTER — NON-APPOINTMENT (OUTPATIENT)
Age: 25
End: 2025-04-04

## 2025-04-04 ENCOUNTER — APPOINTMENT (OUTPATIENT)
Dept: INTERNAL MEDICINE | Facility: CLINIC | Age: 25
End: 2025-04-04
Payer: COMMERCIAL

## 2025-04-04 VITALS
BODY MASS INDEX: 27.14 KG/M2 | HEART RATE: 83 BPM | SYSTOLIC BLOOD PRESSURE: 122 MMHG | OXYGEN SATURATION: 100 % | HEIGHT: 64 IN | RESPIRATION RATE: 15 BRPM | TEMPERATURE: 98.2 F | DIASTOLIC BLOOD PRESSURE: 82 MMHG | WEIGHT: 159 LBS

## 2025-04-04 DIAGNOSIS — Z00.00 ENCOUNTER FOR GENERAL ADULT MEDICAL EXAMINATION W/OUT ABNORMAL FINDINGS: ICD-10-CM

## 2025-04-04 DIAGNOSIS — E66.3 OVERWEIGHT: ICD-10-CM

## 2025-04-04 DIAGNOSIS — Z78.9 OTHER SPECIFIED HEALTH STATUS: ICD-10-CM

## 2025-04-04 DIAGNOSIS — F32.A ANXIETY DISORDER, UNSPECIFIED: ICD-10-CM

## 2025-04-04 DIAGNOSIS — E78.5 HYPERLIPIDEMIA, UNSPECIFIED: ICD-10-CM

## 2025-04-04 DIAGNOSIS — Z86.59 PERSONAL HISTORY OF OTHER MENTAL AND BEHAVIORAL DISORDERS: ICD-10-CM

## 2025-04-04 DIAGNOSIS — J30.2 OTHER SEASONAL ALLERGIC RHINITIS: ICD-10-CM

## 2025-04-04 DIAGNOSIS — Z01.84 ENCOUNTER FOR ANTIBODY RESPONSE EXAMINATION: ICD-10-CM

## 2025-04-04 DIAGNOSIS — Z11.1 ENCOUNTER FOR SCREENING FOR RESPIRATORY TUBERCULOSIS: ICD-10-CM

## 2025-04-04 DIAGNOSIS — K58.1 IRRITABLE BOWEL SYNDROME WITH CONSTIPATION: ICD-10-CM

## 2025-04-04 DIAGNOSIS — F41.9 ANXIETY DISORDER, UNSPECIFIED: ICD-10-CM

## 2025-04-04 DIAGNOSIS — G47.33 OBSTRUCTIVE SLEEP APNEA (ADULT) (PEDIATRIC): ICD-10-CM

## 2025-04-04 PROCEDURE — 99385 PREV VISIT NEW AGE 18-39: CPT

## 2025-04-04 PROCEDURE — 36415 COLL VENOUS BLD VENIPUNCTURE: CPT

## 2025-04-04 RX ORDER — OLOPATADINE HYDROCHLORIDE 7 MG/ML
SOLUTION OPHTHALMIC
Refills: 0 | Status: ACTIVE | COMMUNITY

## 2025-04-04 RX ORDER — DROSPIRENONE AND ETHINYL ESTRADIOL 0.02-3(28)
3-0.02 KIT ORAL
Refills: 0 | Status: ACTIVE | COMMUNITY

## 2025-04-04 RX ORDER — FLUTICASONE PROPIONATE 50 MCG
50 SPRAY, SUSPENSION (ML) NASAL
Refills: 0 | Status: ACTIVE | COMMUNITY

## 2025-04-04 RX ORDER — SENNOSIDES 8.6 MG/1
CAPSULE, GELATIN COATED ORAL
Refills: 0 | Status: ACTIVE | COMMUNITY

## 2025-04-04 RX ORDER — OMEPRAZOLE 20 MG/1
20 CAPSULE, DELAYED RELEASE ORAL TWICE DAILY
Refills: 0 | Status: ACTIVE | COMMUNITY

## 2025-04-04 RX ORDER — BUSPIRONE HYDROCHLORIDE 30 MG/1
30 TABLET ORAL AT BEDTIME
Refills: 0 | Status: ACTIVE | COMMUNITY

## 2025-04-04 RX ORDER — DULOXETINE HYDROCHLORIDE 60 MG/1
60 CAPSULE, DELAYED RELEASE PELLETS ORAL DAILY
Refills: 0 | Status: ACTIVE | COMMUNITY

## 2025-04-04 RX ORDER — LEVOCETIRIZINE DIHYDROCHLORIDE 5 MG/1
5 TABLET ORAL
Refills: 0 | Status: ACTIVE | COMMUNITY

## 2025-04-04 RX ORDER — BUSPIRONE HYDROCHLORIDE 15 MG/1
15 TABLET ORAL DAILY
Refills: 0 | Status: ACTIVE | COMMUNITY

## 2025-04-07 LAB
ALBUMIN SERPL ELPH-MCNC: 4.4 G/DL
ALP BLD-CCNC: 60 U/L
ALT SERPL-CCNC: 15 U/L
ANION GAP SERPL CALC-SCNC: 13 MMOL/L
APPEARANCE: CLEAR
AST SERPL-CCNC: 20 U/L
BACTERIA: NEGATIVE /HPF
BASOPHILS # BLD AUTO: 0.07 K/UL
BASOPHILS NFR BLD AUTO: 1.3 %
BILIRUB SERPL-MCNC: 0.6 MG/DL
BILIRUBIN URINE: NEGATIVE
BLOOD URINE: NEGATIVE
BUN SERPL-MCNC: 10 MG/DL
CALCIUM SERPL-MCNC: 9.8 MG/DL
CAST: 0 /LPF
CHLORIDE SERPL-SCNC: 102 MMOL/L
CHOLEST SERPL-MCNC: 194 MG/DL
CO2 SERPL-SCNC: 24 MMOL/L
COLOR: YELLOW
CREAT SERPL-MCNC: 0.66 MG/DL
EGFRCR SERPLBLD CKD-EPI 2021: 125 ML/MIN/1.73M2
EOSINOPHIL # BLD AUTO: 0.04 K/UL
EOSINOPHIL NFR BLD AUTO: 0.8 %
EPITHELIAL CELLS: 0 /HPF
ESTIMATED AVERAGE GLUCOSE: 94 MG/DL
GLUCOSE QUALITATIVE U: NEGATIVE MG/DL
GLUCOSE SERPL-MCNC: 78 MG/DL
HBA1C MFR BLD HPLC: 4.9 %
HBV SURFACE AB SERPL IA-ACNC: <3.3 MIU/ML
HBV SURFACE AG SER QL: NONREACTIVE
HCT VFR BLD CALC: 42 %
HCV AB SER QL: NONREACTIVE
HCV S/CO RATIO: 0.2 S/CO
HDLC SERPL-MCNC: 73 MG/DL
HEPATITIS A IGG ANTIBODY: REACTIVE
HGB BLD-MCNC: 14.3 G/DL
IMM GRANULOCYTES NFR BLD AUTO: 0.4 %
KETONES URINE: NEGATIVE MG/DL
LDLC SERPL-MCNC: 88 MG/DL
LEUKOCYTE ESTERASE URINE: NEGATIVE
LYMPHOCYTES # BLD AUTO: 1.73 K/UL
LYMPHOCYTES NFR BLD AUTO: 32.8 %
MAN DIFF?: NORMAL
MCHC RBC-ENTMCNC: 30.1 PG
MCHC RBC-ENTMCNC: 34 G/DL
MCV RBC AUTO: 88.4 FL
MEV IGG FLD QL IA: 150 AU/ML
MEV IGG+IGM SER-IMP: POSITIVE
MICROSCOPIC-UA: NORMAL
MONOCYTES # BLD AUTO: 0.62 K/UL
MONOCYTES NFR BLD AUTO: 11.7 %
MUV AB SER-ACNC: POSITIVE
MUV IGG SER QL IA: 44.7 AU/ML
NEUTROPHILS # BLD AUTO: 2.8 K/UL
NEUTROPHILS NFR BLD AUTO: 53 %
NITRITE URINE: NEGATIVE
NONHDLC SERPL-MCNC: 121 MG/DL
PH URINE: 7.5
PLATELET # BLD AUTO: 207 K/UL
POTASSIUM SERPL-SCNC: 4.5 MMOL/L
PROT SERPL-MCNC: 7.6 G/DL
PROTEIN URINE: NEGATIVE MG/DL
RBC # BLD: 4.75 M/UL
RBC # FLD: 12.8 %
RED BLOOD CELLS URINE: 0 /HPF
RUBV IGG FLD-ACNC: 11.6 INDEX
RUBV IGG SER-IMP: POSITIVE
SODIUM SERPL-SCNC: 139 MMOL/L
SPECIFIC GRAVITY URINE: 1.01
TRIGL SERPL-MCNC: 201 MG/DL
TSH SERPL-ACNC: 1.65 UIU/ML
UROBILINOGEN URINE: 0.2 MG/DL
VZV AB TITR SER: POSITIVE
VZV IGG SER IF-ACNC: 4.36 S/CO
WBC # FLD AUTO: 5.28 K/UL
WHITE BLOOD CELLS URINE: 0 /HPF

## 2025-04-08 ENCOUNTER — APPOINTMENT (OUTPATIENT)
Dept: PULMONOLOGY | Facility: CLINIC | Age: 25
End: 2025-04-08
Payer: COMMERCIAL

## 2025-04-08 VITALS — DIASTOLIC BLOOD PRESSURE: 67 MMHG | HEART RATE: 103 BPM | SYSTOLIC BLOOD PRESSURE: 94 MMHG | OXYGEN SATURATION: 100 %

## 2025-04-08 LAB
M TB IFN-G BLD-IMP: NEGATIVE
QUANTIFERON TB PLUS MITOGEN MINUS NIL: 6.01 IU/ML
QUANTIFERON TB PLUS NIL: 0.07 IU/ML
QUANTIFERON TB PLUS TB1 MINUS NIL: 0 IU/ML
QUANTIFERON TB PLUS TB2 MINUS NIL: 0 IU/ML

## 2025-04-08 PROCEDURE — 99213 OFFICE O/P EST LOW 20 MIN: CPT

## 2025-04-08 PROCEDURE — G2211 COMPLEX E/M VISIT ADD ON: CPT | Mod: NC

## 2025-04-23 ENCOUNTER — APPOINTMENT (OUTPATIENT)
Dept: PULMONOLOGY | Facility: CLINIC | Age: 25
End: 2025-04-23
Payer: COMMERCIAL

## 2025-04-23 DIAGNOSIS — G47.33 OBSTRUCTIVE SLEEP APNEA (ADULT) (PEDIATRIC): ICD-10-CM

## 2025-04-23 PROCEDURE — 99212 OFFICE O/P EST SF 10 MIN: CPT | Mod: 95

## 2025-04-23 PROCEDURE — G2211 COMPLEX E/M VISIT ADD ON: CPT | Mod: NC,95

## 2025-06-17 NOTE — BH DISCHARGE NOTE NURSING/SOCIAL WORK/PSYCH REHAB - NSBHDCAPPT1DT_PSY_A_CORE
Group Therapy Note    Date: 1/15/2025    Group Start Time: 2000  Group End Time: 2040  Group Topic: Wrap-Up    [facility] 3W Jessica Esquivel    Patients filled out 2025 [employer] vision boards in tonight's wrap up group.     Group Therapy Note    Attendees: 12/19       Notes:  [facility]. Pt shared that this year he wants to be more \"open with my feelings\". He also mentioned that since being here and talking with peers and staff more today he has not had depressive thoughts as of recently.     Discipline Responsible: Behavorial Health Tech      Signature:  Jessica Salazar     Pt reports an accident on 6/15, has been having headache since. States he took motrin which helped. Also wants a bite behind his ear to be checked   17-May-2022 08:00

## 2025-08-13 ENCOUNTER — TRANSCRIPTION ENCOUNTER (OUTPATIENT)
Age: 25
End: 2025-08-13